# Patient Record
Sex: FEMALE | Race: WHITE | URBAN - METROPOLITAN AREA
[De-identification: names, ages, dates, MRNs, and addresses within clinical notes are randomized per-mention and may not be internally consistent; named-entity substitution may affect disease eponyms.]

---

## 2017-01-02 DIAGNOSIS — A60.00 HERPES SIMPLEX INFECTION OF GENITOURINARY SYSTEM: Primary | ICD-10-CM

## 2017-01-02 RX ORDER — VALACYCLOVIR HYDROCHLORIDE 500 MG/1
500 TABLET, FILM COATED ORAL DAILY
Qty: 90 TABLET | Refills: 1 | Status: SHIPPED | OUTPATIENT
Start: 2017-01-02 | End: 2017-07-25

## 2017-01-02 NOTE — TELEPHONE ENCOUNTER
Valacyclovir 500mg      Last Written Prescription Date:  6/2/16  Last Fill Quantity: 90,   # refills: 3  Last Office Visit with [facility] primary care provider:  6/2/16-menopause appt  Future Office visit: none    Rx was sent to [facility], Fax received from [facility].   Called pt and verified switch from [facility] to [facility], pt verified and stated she was switching for insurance purposes.  Called [facility] and spoke to Nataly who cancelled remaining of pt's Rx at their location, she stated pt still had 6 month supply before she cancelled the Rx.  6 month supply sent to [facility].

## 2017-01-03 ENCOUNTER — TELEPHONE (OUTPATIENT)
Dept: NURSING | Facility: CLINIC | Age: 52
End: 2017-01-03

## 2017-01-03 NOTE — TELEPHONE ENCOUNTER
Fax received from [facility] wondering if pt could take an alternative acyclovir 400mg instead of valacyclovir 500mg daily, or no change. Called pharmacist who stated that it is their policy to ask but if pt has been successful on another regimen then the medication dose not need to be changed. Informed pharmacist pt has been on valacyclovir 500mg in the past and it has worked for her, so they should keep the Rx for what was sent by the provider. He stated understanding and had no further questions.

## 2017-01-05 ENCOUNTER — TRANSFERRED RECORDS (OUTPATIENT)
Dept: HEALTH INFORMATION MANAGEMENT | Facility: CLINIC | Age: 52
End: 2017-01-05

## 2017-01-25 DIAGNOSIS — M46.46 DISCITIS OF LUMBAR REGION: Primary | ICD-10-CM

## 2017-01-25 DIAGNOSIS — Z48.89 AFTERCARE FOLLOWING SURGERY FOR INJURY AND TRAUMA: ICD-10-CM

## 2017-01-25 LAB
BASOPHILS # BLD AUTO: 0 10E9/L (ref 0–0.2)
BASOPHILS NFR BLD AUTO: 0.3 %
CRP SERPL-MCNC: <2.9 MG/L (ref 0–8)
DIFFERENTIAL METHOD BLD: NORMAL
EOSINOPHIL # BLD AUTO: 0 10E9/L (ref 0–0.7)
EOSINOPHIL NFR BLD AUTO: 0.4 %
ERYTHROCYTE [DISTWIDTH] IN BLOOD BY AUTOMATED COUNT: 12.6 % (ref 10–15)
ERYTHROCYTE [SEDIMENTATION RATE] IN BLOOD BY WESTERGREN METHOD: 10 MM/H (ref 0–30)
HCT VFR BLD AUTO: 38.3 % (ref 35–47)
HGB BLD-MCNC: 12.6 G/DL (ref 11.7–15.7)
LYMPHOCYTES # BLD AUTO: 2.4 10E9/L (ref 0.8–5.3)
LYMPHOCYTES NFR BLD AUTO: 31.2 %
MCH RBC QN AUTO: 30.5 PG (ref 26.5–33)
MCHC RBC AUTO-ENTMCNC: 32.9 G/DL (ref 31.5–36.5)
MCV RBC AUTO: 93 FL (ref 78–100)
MONOCYTES # BLD AUTO: 0.4 10E9/L (ref 0–1.3)
MONOCYTES NFR BLD AUTO: 4.7 %
NEUTROPHILS # BLD AUTO: 4.9 10E9/L (ref 1.6–8.3)
NEUTROPHILS NFR BLD AUTO: 63.4 %
PLATELET # BLD AUTO: 248 10E9/L (ref 150–450)
RBC # BLD AUTO: 4.13 10E12/L (ref 3.8–5.2)
WBC # BLD AUTO: 7.7 10E9/L (ref 4–11)

## 2017-01-25 PROCEDURE — 36415 COLL VENOUS BLD VENIPUNCTURE: CPT | Performed by: FAMILY MEDICINE

## 2017-01-25 PROCEDURE — 85025 COMPLETE CBC W/AUTO DIFF WBC: CPT | Performed by: FAMILY MEDICINE

## 2017-01-25 PROCEDURE — 86140 C-REACTIVE PROTEIN: CPT | Performed by: FAMILY MEDICINE

## 2017-01-25 PROCEDURE — 85652 RBC SED RATE AUTOMATED: CPT | Performed by: FAMILY MEDICINE

## 2017-02-02 ENCOUNTER — TRANSFERRED RECORDS (OUTPATIENT)
Dept: HEALTH INFORMATION MANAGEMENT | Facility: CLINIC | Age: 52
End: 2017-02-02

## 2017-03-13 DIAGNOSIS — Z79.890 HORMONE REPLACEMENT THERAPY (HRT): ICD-10-CM

## 2017-03-13 RX ORDER — ESTRADIOL 1 MG/1
1 TABLET ORAL DAILY
Qty: 90 TABLET | Refills: 0 | Status: SHIPPED | OUTPATIENT
Start: 2017-03-13 | End: 2017-03-23

## 2017-03-13 NOTE — TELEPHONE ENCOUNTER
estradiol (ESTRACE) 1 MG tablet  Last Written Prescription Date:  6/3/16  Last Fill Quantity: 90,   # refills: 2  Last Office Visit with [facility] primary care provider:  6/2/16  Future Office visit: none    Routing refill request to provider for review/approval because:  Refill sent per [facility] protocol.   Will RTC in a year for refill or PCP can refill for her.

## 2017-03-17 ENCOUNTER — TRANSFERRED RECORDS (OUTPATIENT)
Dept: HEALTH INFORMATION MANAGEMENT | Facility: CLINIC | Age: 52
End: 2017-03-17

## 2017-03-23 ENCOUNTER — OFFICE VISIT (OUTPATIENT)
Dept: FAMILY MEDICINE | Facility: CLINIC | Age: 52
End: 2017-03-23
Payer: COMMERCIAL

## 2017-03-23 VITALS
RESPIRATION RATE: 14 BRPM | HEIGHT: 69 IN | OXYGEN SATURATION: 98 % | SYSTOLIC BLOOD PRESSURE: 104 MMHG | HEART RATE: 78 BPM | WEIGHT: 179 LBS | BODY MASS INDEX: 26.51 KG/M2 | DIASTOLIC BLOOD PRESSURE: 64 MMHG | TEMPERATURE: 98.3 F

## 2017-03-23 DIAGNOSIS — I10 ESSENTIAL HYPERTENSION WITH GOAL BLOOD PRESSURE LESS THAN 140/90: ICD-10-CM

## 2017-03-23 DIAGNOSIS — Z79.890 HORMONE REPLACEMENT THERAPY (HRT): ICD-10-CM

## 2017-03-23 DIAGNOSIS — M51.369 L4-L5 DISC BULGE: Primary | ICD-10-CM

## 2017-03-23 PROCEDURE — 99213 OFFICE O/P EST LOW 20 MIN: CPT | Performed by: FAMILY MEDICINE

## 2017-03-23 RX ORDER — LISINOPRIL 10 MG/1
TABLET ORAL
Qty: 90 TABLET | Refills: 2 | Status: SHIPPED | OUTPATIENT
Start: 2017-03-23 | End: 2018-06-28 | Stop reason: DRUGHIGH

## 2017-03-23 RX ORDER — ESTRADIOL 1 MG/1
1 TABLET ORAL DAILY
Qty: 90 TABLET | Refills: 2 | Status: SHIPPED | OUTPATIENT
Start: 2017-03-23 | End: 2018-02-25

## 2017-03-23 NOTE — PROGRESS NOTES
Chief Complaint   Patient presents with     Forms     FMLA     Recheck Medication     follow up refills     Subjective:ssee FMLA form. Basically she had surgery in December, finally got back to work full-time now but there are times when her back flares up and she needs to have FMLA papers filled out so that they will pricilla her time off to go to doctor's visits and if she needs to take a few days off to recover, she can do that. She also needs a refill of the hormone replacement and blood pressure medication till her next physical is due in December.    Assessment and plan: FMLA forms for chronic back problems in case she needs significant time off over the years. It seems reasonable. Meds refilled.

## 2017-03-23 NOTE — NURSING NOTE
"Chief Complaint   Patient presents with     Forms     FMLA     Recheck Medication     follow up refills       Initial /64 (BP Location: Left arm, Patient Position: Chair, Cuff Size: Adult Regular)  Pulse 78  Temp 98.3  F (36.8  C) (Oral)  Resp 14  Ht 5' 9\" (1.753 m)  Wt 179 lb (81.2 kg)  SpO2 98%  Breastfeeding? No  BMI 26.43 kg/m2 Estimated body mass index is 26.43 kg/(m^2) as calculated from the following:    Height as of this encounter: 5' 9\" (1.753 m).    Weight as of this encounter: 179 lb (81.2 kg).  BP completed using cuff size: regular    Health Maintenance that is potentially due pending provider review:  PHQ9    PHQ/ACT/LAMIN--Gave pt questionnare  "

## 2017-03-23 NOTE — MR AVS SNAPSHOT
"              After Visit Summary   3/23/2017    Sima Ross    MRN: [id number]           Patient Information     Date Of Birth          1965        Visit Information        Provider Department      3/23/2017 8:30 AM Ilia Cervantes MD [facility]        Today's Diagnoses     L4-L5 disc bulge    -  1    Essential hypertension with goal blood pressure less than 140/90        Hormone replacement therapy (HRT)           Follow-ups after your visit        Who to contact     If you have questions or need follow up information about today's clinic visit or your schedule please contact [facility] directly at [phone].  Normal or non-critical lab and imaging results will be communicated to you by MyChart, letter or phone within 4 business days after the clinic has received the results. If you do not hear from us within 7 days, please contact the clinic through Bootstrap[employer] or phone. If you have a critical or abnormal lab result, we will notify you by phone as soon as possible.  Submit refill requests through textmetix or call your pharmacy and they will forward the refill request to us. Please allow 3 business days for your refill to be completed.          Additional Information About Your Visit        MyChart Information     textmetix lets you send messages to your doctor, view your test results, renew your prescriptions, schedule appointments and more. To sign up, go to [email or web address] . Click on \"Log in\" on the left side of the screen, which will take you to the Welcome page. Then click on \"Sign up Now\" on the right side of the page.     You will be asked to enter the access code listed below, as well as some personal information. Please follow the directions to create your username and password.     Your access code is: [email or web address]  Expires: 2017 11:03 AM     Your access code will  in 90 days. If you need help or a new code, please call your [place] clinic or " "[phone].        Care EveryWhere ID     This is your Care EveryWhere ID. This could be used by other organizations to access your [place] medical records  [id number]        Your Vitals Were     Pulse Temperature Respirations Height Pulse Oximetry Breastfeeding?    78 98.3  F (36.8  C) (Oral) 14 5' 9\" (1.753 m) 98% No    BMI (Body Mass Index)                   26.43 kg/m2            Blood Pressure from Last 3 Encounters:   03/23/17 104/64   12/05/16 (!) 135/98   07/28/16 138/80    Weight from Last 3 Encounters:   03/23/17 179 lb (81.2 kg)   12/05/16 169 lb 8 oz (76.9 kg)   07/28/16 171 lb (77.6 kg)              Today, you had the following     No orders found for display         Where to get your medicines      These medications were sent to [facility] [address] AT [address] to [address], [place] [address]     Phone:  [phone]     estradiol 1 MG tablet    lisinopril 10 MG tablet    tiZANidine 4 MG tablet          Primary Care Provider Office Phone # Fax #    Jessica Flores [phone]       [facility] [address] [address]        Thank you!     Thank you for choosing [facility]  for your care. Our goal is always to provide you with excellent care. Hearing back from our patients is one way we can continue to improve our services. Please take a few minutes to complete the written survey that you may receive in the mail after your visit with us. Thank you!             Your Updated Medication List - Protect others around you: Learn how to safely use, store and throw away your medicines at www.disposemymeds.org.          This list is accurate as of: 3/23/17 11:03 AM.  Always use your most recent med list.                   Brand Name Dispense Instructions for use    dicyclomine 20 MG tablet    BENTYL    360 tablet    Take 1 tablet (20 mg) by mouth every 6 " hours       estradiol 1 MG tablet    ESTRACE    90 tablet    Take 1 tablet (1 mg) by mouth daily       lisinopril 10 MG tablet    PRINIVIL/ZESTRIL    90 tablet    HOLD       oxyCODONE-acetaminophen 5-325 MG per tablet    PERCOCET         rizatriptan 10 MG tablet    MAXALT    18 tablet    Take 1 tablet (10 mg) by mouth at onset of headache for migraine May repeat dose in 2 hours.  Do not exceed 30 mg in 24 hours       tiZANidine 4 MG tablet    ZANAFLEX    160 tablet    Take 2 tablets (8 mg) by mouth 3 times daily       traZODone 50 MG tablet    DESYREL    180 tablet    Take 2 tablets (100 mg) by mouth nightly as needed for sleep       triamterene-hydrochlorothiazide 37.5-25 MG per tablet    MAXZIDE-25    90 tablet    Take 1 tablet by mouth daily       valACYclovir 500 MG tablet    VALTREX    90 tablet    Take 1 tablet (500 mg) by mouth daily

## 2017-03-24 ASSESSMENT — PATIENT HEALTH QUESTIONNAIRE - PHQ9: SUM OF ALL RESPONSES TO PHQ QUESTIONS 1-9: 6

## 2017-03-29 ENCOUNTER — TELEPHONE (OUTPATIENT)
Dept: FAMILY MEDICINE | Facility: CLINIC | Age: 52
End: 2017-03-29

## 2017-03-29 NOTE — TELEPHONE ENCOUNTER
Reason for Call:  Other FMLA     Detailed comments: Duration of time needs to be specified on FMLA form...Please revise  And Fax back     Phone Number Patient can be reached at: Home number on file [phone] (home) Please call patient upon completion     Best Time: anytime     Can we leave a detailed message on this number? YES    Call taken on 3/29/2017 at 3:34 PM by Ernesto Capone

## 2017-03-30 NOTE — TELEPHONE ENCOUNTER
I called [facility] and they just needed something in 7c filled in. I did that and we will fax it bck  LM for pt it is re-sent    will bring up to fax

## 2017-03-30 NOTE — TELEPHONE ENCOUNTER
I LM for pt to call back as it is not clear to me what they want. The note talks about extending the previous leave, but that is not what the form I filled out was about-it is about potential FUTURE episodes where she would need time out of work

## 2017-04-05 ENCOUNTER — HOSPITAL ENCOUNTER (OUTPATIENT)
Dept: LAB | Facility: CLINIC | Age: 52
Discharge: HOME | End: 2017-04-05
Attending: FAMILY MEDICINE | Admitting: FAMILY MEDICINE
Payer: COMMERCIAL

## 2017-04-05 DIAGNOSIS — D58.2 ELEVATED HEMOGLOBIN (H): ICD-10-CM

## 2017-04-05 LAB
ALBUMIN SERPL-MCNC: 3.6 G/DL (ref 3.4–5)
ALP SERPL-CCNC: 76 U/L (ref 40–150)
ALT SERPL W P-5'-P-CCNC: 13 U/L (ref 0–50)
AST SERPL W P-5'-P-CCNC: 8 U/L (ref 0–45)
BASOPHILS # BLD AUTO: 0.1 10E9/L (ref 0–0.2)
BASOPHILS NFR BLD AUTO: 1.1 %
BILIRUB DIRECT SERPL-MCNC: <0.1 MG/DL (ref 0–0.2)
BILIRUB SERPL-MCNC: 0.3 MG/DL (ref 0.2–1.3)
DIFFERENTIAL METHOD BLD: NORMAL
EOSINOPHIL # BLD AUTO: 0.2 10E9/L (ref 0–0.7)
EOSINOPHIL NFR BLD AUTO: 3.4 %
ERYTHROCYTE [DISTWIDTH] IN BLOOD BY AUTOMATED COUNT: 11.8 % (ref 10–15)
HCT VFR BLD AUTO: 40.1 % (ref 35–47)
HGB BLD-MCNC: 14 G/DL (ref 11.7–15.7)
IMM GRANULOCYTES # BLD: 0 10E9/L (ref 0–0.4)
IMM GRANULOCYTES NFR BLD: 0.4 %
LYMPHOCYTES # BLD AUTO: 1.9 10E9/L (ref 0.8–5.3)
LYMPHOCYTES NFR BLD AUTO: 35.4 %
MCH RBC QN AUTO: 31.5 PG (ref 26.5–33)
MCHC RBC AUTO-ENTMCNC: 34.9 G/DL (ref 31.5–36.5)
MCV RBC AUTO: 90 FL (ref 78–100)
MONOCYTES # BLD AUTO: 0.3 10E9/L (ref 0–1.3)
MONOCYTES NFR BLD AUTO: 5.5 %
NEUTROPHILS # BLD AUTO: 2.9 10E9/L (ref 1.6–8.3)
NEUTROPHILS NFR BLD AUTO: 54.2 %
NRBC # BLD AUTO: 0 10*3/UL
NRBC BLD AUTO-RTO: 0 /100
PLATELET # BLD AUTO: 249 10E9/L (ref 150–450)
PROT SERPL-MCNC: 6.7 G/DL (ref 6.8–8.8)
RBC # BLD AUTO: 4.45 10E12/L (ref 3.8–5.2)
WBC # BLD AUTO: 5.3 10E9/L (ref 4–11)

## 2017-04-05 PROCEDURE — 36415 COLL VENOUS BLD VENIPUNCTURE: CPT | Performed by: FAMILY MEDICINE

## 2017-04-05 PROCEDURE — 85025 COMPLETE CBC W/AUTO DIFF WBC: CPT | Performed by: FAMILY MEDICINE

## 2017-04-05 PROCEDURE — 80076 HEPATIC FUNCTION PANEL: CPT | Performed by: FAMILY MEDICINE

## 2017-04-06 ENCOUNTER — TRANSFERRED RECORDS (OUTPATIENT)
Dept: HEALTH INFORMATION MANAGEMENT | Facility: CLINIC | Age: 52
End: 2017-04-06

## 2017-04-06 DIAGNOSIS — Z48.89 AFTERCARE FOLLOWING SURGERY FOR INJURY OR TRAUMA: Primary | ICD-10-CM

## 2017-04-06 LAB — ERYTHROCYTE [SEDIMENTATION RATE] IN BLOOD BY WESTERGREN METHOD: 8 MM/H (ref 0–30)

## 2017-04-06 PROCEDURE — 36415 COLL VENOUS BLD VENIPUNCTURE: CPT

## 2017-04-06 PROCEDURE — 86140 C-REACTIVE PROTEIN: CPT

## 2017-04-06 PROCEDURE — 85652 RBC SED RATE AUTOMATED: CPT

## 2017-04-07 LAB — CRP SERPL-MCNC: <2.9 MG/L (ref 0–8)

## 2017-04-10 ENCOUNTER — TRANSFERRED RECORDS (OUTPATIENT)
Dept: HEALTH INFORMATION MANAGEMENT | Facility: CLINIC | Age: 52
End: 2017-04-10

## 2017-04-18 ENCOUNTER — THERAPY VISIT (OUTPATIENT)
Dept: PHYSICAL THERAPY | Facility: CLINIC | Age: 52
End: 2017-04-18
Payer: COMMERCIAL

## 2017-04-18 DIAGNOSIS — M54.50 MIDLINE LOW BACK PAIN WITHOUT SCIATICA: Primary | ICD-10-CM

## 2017-04-18 DIAGNOSIS — Z47.89 ORTHOPEDIC AFTERCARE: ICD-10-CM

## 2017-04-18 PROCEDURE — 97161 PT EVAL LOW COMPLEX 20 MIN: CPT | Mod: GP | Performed by: PHYSICAL THERAPIST

## 2017-04-18 PROCEDURE — 97110 THERAPEUTIC EXERCISES: CPT | Mod: GP | Performed by: PHYSICAL THERAPIST

## 2017-04-18 PROCEDURE — 97530 THERAPEUTIC ACTIVITIES: CPT | Mod: GP | Performed by: PHYSICAL THERAPIST

## 2017-04-18 NOTE — PROGRESS NOTES
Subjective:    Patient is a 51 year old female presenting with rehab left ankle/foot hpi.                                      Pertinent medical history includes:  High blood pressure.  Medical allergies: no.  Other surgeries include:  Orthopedic surgery.  Current medications:  Muscle relaxants, high blood pressure medication and steroids.  Current occupation is Customer service .  Patient is working in normal job without restrictions.  Primary job tasks include:  Prolonged sitting and prolonged standing.    Barriers include:  None as reported by patient.    Red flags:  Changes in bowel and bladder habits.      Oswestry Score: 60 %                 Objective:    System    Physical Exam    General     ROS    Assessment/Plan:                 first set trops negative 2nd trops negative. unlikely ACS. will discharge

## 2017-04-18 NOTE — PROGRESS NOTES
Physical Therapy Initial Evaluation  April 18, 2017     Precautions/Restrictions/MD instructions: PT eval and treat.     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 Coughing is excruciatingly painful. Does report that leg sx's resolved immediately following surgery. Quality of pain is dull and aching. Pains are described as intermittent in nature. Pain is worse: as day goes on. Pain is rated 7/10.   History of symptoms: Pains began gradually as the result of insidious onset. Since onset, symptoms are improving.  Worsened by: Sitting, standing, .    Alleviated by: Nothing. Now finishing prednisone which has not touched her pain.   General health as reported by patient: good  Pertinent medical/surgical history: diskectomy 2009, fusion 2016. Imaging: x-ray and MRI and myelogram. Current occupational status: Customer Service. Patient's goals are: decrease pain, improve mobility. Return to MD:  June.     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 She presents with signs and symptoms consistent with post-surgical status. These impairments limit her ability to sit, stand, walk, navigate stairs. Skilled PT services are necessary in order to reduce impairments and improve independent function.    Objective:  LUMBAR EXAMINATION    Posture: Site of fusion is WNL for lordosis, though loss of lordotic curve above site of fusion.    Active ROM Limitation   Flexion     Louis? Major loss, incr LBP and buttock pain  na   Extension Mod loss, no pain    L R   Sideglide Nil loss, no pain Nil loss, no pain     Neurological testing (myotomes, sensation, reflexes, nerve tension) not indicated at this time.    Special Tests:  Rust: na  Spring Testing: na  Glute MMT: 4-/5  bilaterally    Therapeutic Activities (12 minutes): Discussed with patient postural correction with instruction in use of lumbar/towel roll and sitting posture when unsupported. Education provided regarding impact of posture and body mechanics on symptom production. Patient encouraged to monitor this correlation as part of overall self management. Also discussed goal of avoiding flexion-based postures/activities/stretches at this time as they may exacerbate current symptoms.     Assessment/Plan:    The patient is a 51 year old female with chief complaint of LBP.    The patient has the following significant findings with corresponding treatment plan.  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  Pain -  hot/cold therapy, US, electric stimulation, manual therapy, splint/taping/bracing/orthotics, self management, education, directional preference exercise and home program  Decreased ROM/flexibility - manual therapy, therapeutic exercise and home program  Decreased joint mobility - manual therapy and therapeutic exercise  Decreased strength - therapeutic exercise, therapeutic activities and home program  Impaired muscle performance - neuro re-education  Decreased function - therapeutic activities  Impaired posture - neuro re-education    Therapy Evaluation Codes:   1) History comprised of:   Personal factors that impact the plan of care:      Past/current experiences.    Comorbidity factors that impact the plan of care are:      None.     Medications impacting care: None.  2) Examination of Body Systems comprised of:   Body structures and functions that impact the plan of care:      Lumbar spine.   Activity limitations that impact the plan of care are:      Bathing, Bending, Driving, Dressing, Lifting, Sitting, Squatting/kneeling, Stairs, Standing and Walking.   Clinical presentation characteristics are:    Stable/Uncomplicated.  3) Presentation comprised of:   Presentation scored as Low complexity with  uncomplicated characteristics..  4) Decision-Making    Low complexity using standardized patient assessment instrument and/or measureable assessment of functional outcome.  Cumulative Therapy Evaluation is: Low complexity.    Previous and current functional limitations:  (See Goal Flow Sheet for this information)    Short term and Long term goals: (See Goal Flow Sheet for this information)     Communication ability:  Patient appears to be able to clearly communicate and understand verbal and written communication and follow directions correctly.  Treatment Explanation - The following has been discussed with the patient: RX ordered/plan of care, anticipated outcomes, and possible risks and side effects.  This patient would benefit from PT intervention to resume normal activities.   Rehab potential is good.    Frequency:  1 X week, once daily  Duration:  for 5 weeks  Discharge Plan: Achieve all LTGs, be independent in home treatment program, and reach maximal therapeutic benefit.    Please refer to the daily flowsheet for treatment today, total treatment time and time spent performing 1:1 timed codes.

## 2017-04-21 ENCOUNTER — TRANSFERRED RECORDS (OUTPATIENT)
Dept: HEALTH INFORMATION MANAGEMENT | Facility: CLINIC | Age: 52
End: 2017-04-21

## 2017-05-09 ENCOUNTER — TELEPHONE (OUTPATIENT)
Dept: FAMILY MEDICINE | Facility: CLINIC | Age: 52
End: 2017-05-09

## 2017-05-09 NOTE — TELEPHONE ENCOUNTER
Received form(s) from [facility]  Placed form(s) in/on SN's box.  Forms need to be filled out and signed and faxed to number listed on form..    Call pt to verify form was sent: No  Copy needs to be sent for scanning after completion: Yes    Vidya Christian MA  [facility]

## 2017-05-10 NOTE — TELEPHONE ENCOUNTER
Patient calling back and she did Confirm that she did contact [facility]  X to Shantal :)    Thank You

## 2017-05-10 NOTE — TELEPHONE ENCOUNTER
Left  radha Gao to -- Please  Confirm with patient  that she contacted [facility] for medical consultation services     form in tower to confirm before faxing  Shantal Russell CMA

## 2017-05-22 ENCOUNTER — TELEPHONE (OUTPATIENT)
Dept: FAMILY MEDICINE | Facility: CLINIC | Age: 52
End: 2017-05-22

## 2017-05-22 NOTE — TELEPHONE ENCOUNTER
Clinic Action Needed: Yes  Medication Refilled: No per RN Refill Guidelines  Additional Notes: Trazodone 50 mg.  Sima reports her [facility] pharmacy insurance plan requires further refills to be quantity of 100.  She states [facility] did send clinic a letter, but wanted to make sure Dr. Lopez knew this, as she is due for a refill now.    Routed To: Dr. Flores / LELA Naidu RN  [facility]

## 2017-05-23 NOTE — TELEPHONE ENCOUNTER
Original Rx from 12/2016 is for #180 with 3 refills.  Spoke with patient. States they sent her letter stating 100 mg tablets are cheaper and would request from PCP.  Called [facility]  States they have no documentation of letter/form. Can't find information about requesting 100 mg tablets  Last refill 3/16/17 for #180 - states patient has 2 refills remaining  Patient informed.  Will call us back if she hears anything different from NEIL Slater RN

## 2017-05-31 DIAGNOSIS — Z79.890 HORMONE REPLACEMENT THERAPY (HRT): ICD-10-CM

## 2017-05-31 RX ORDER — ESTRADIOL 1 MG/1
TABLET ORAL
Qty: 90 TABLET | Refills: 0 | OUTPATIENT
Start: 2017-05-31

## 2017-05-31 NOTE — TELEPHONE ENCOUNTER
Estradiol (Estrace) 1 MG tablet      Last Written Prescription Date:  3/23/17  Last Fill Quantity: 90 tabs,   # refills: 2  Last Office Visit with [facility] primary care provider:  6/2/16-Annual  Future Office visit: None    Rx that was sent on 3/23/17 for 90 tabs/2rf was by Ilia Cervantes MD and not Dr. Mina. Rx denied has pt has refills.      Closing encounter.

## 2017-06-06 ENCOUNTER — TRANSFERRED RECORDS (OUTPATIENT)
Dept: HEALTH INFORMATION MANAGEMENT | Facility: CLINIC | Age: 52
End: 2017-06-06

## 2017-06-07 ENCOUNTER — TRANSFERRED RECORDS (OUTPATIENT)
Dept: HEALTH INFORMATION MANAGEMENT | Facility: CLINIC | Age: 52
End: 2017-06-07

## 2017-07-06 ENCOUNTER — TRANSFERRED RECORDS (OUTPATIENT)
Dept: HEALTH INFORMATION MANAGEMENT | Facility: CLINIC | Age: 52
End: 2017-07-06

## 2017-07-28 ENCOUNTER — TRANSFERRED RECORDS (OUTPATIENT)
Dept: HEALTH INFORMATION MANAGEMENT | Facility: CLINIC | Age: 52
End: 2017-07-28

## 2017-08-23 ENCOUNTER — TRANSFERRED RECORDS (OUTPATIENT)
Dept: HEALTH INFORMATION MANAGEMENT | Facility: CLINIC | Age: 52
End: 2017-08-23

## 2017-08-30 ENCOUNTER — OFFICE VISIT (OUTPATIENT)
Dept: FAMILY MEDICINE | Facility: CLINIC | Age: 52
End: 2017-08-30
Payer: COMMERCIAL

## 2017-08-30 VITALS
HEIGHT: 69 IN | TEMPERATURE: 98.4 F | WEIGHT: 163.8 LBS | HEART RATE: 86 BPM | SYSTOLIC BLOOD PRESSURE: 124 MMHG | DIASTOLIC BLOOD PRESSURE: 80 MMHG | BODY MASS INDEX: 24.26 KG/M2 | RESPIRATION RATE: 14 BRPM | OXYGEN SATURATION: 94 %

## 2017-08-30 DIAGNOSIS — M54.16 LUMBAR RADICULOPATHY: ICD-10-CM

## 2017-08-30 DIAGNOSIS — Z01.818 PREOP GENERAL PHYSICAL EXAM: Primary | ICD-10-CM

## 2017-08-30 DIAGNOSIS — E04.9 GOITER: ICD-10-CM

## 2017-08-30 DIAGNOSIS — M51.369 L4-L5 DISC BULGE: ICD-10-CM

## 2017-08-30 LAB — HGB BLD-MCNC: 12.9 G/DL (ref 11.7–15.7)

## 2017-08-30 PROCEDURE — 99214 OFFICE O/P EST MOD 30 MIN: CPT | Mod: 25 | Performed by: FAMILY MEDICINE

## 2017-08-30 PROCEDURE — 84443 ASSAY THYROID STIM HORMONE: CPT | Performed by: FAMILY MEDICINE

## 2017-08-30 PROCEDURE — 36415 COLL VENOUS BLD VENIPUNCTURE: CPT | Performed by: FAMILY MEDICINE

## 2017-08-30 PROCEDURE — 93000 ELECTROCARDIOGRAM COMPLETE: CPT | Performed by: FAMILY MEDICINE

## 2017-08-30 PROCEDURE — 85018 HEMOGLOBIN: CPT | Performed by: FAMILY MEDICINE

## 2017-08-30 PROCEDURE — 84439 ASSAY OF FREE THYROXINE: CPT | Performed by: FAMILY MEDICINE

## 2017-08-30 NOTE — MR AVS SNAPSHOT
After Visit Summary   8/30/2017    Sima Ross    MRN: [id number]           Patient Information     Date Of Birth          1965        Visit Information        Provider Department      8/30/2017 12:30 PM Jessica Flores MD [facility]        Today's Diagnoses     Preop general physical exam    -  1    L4-L5 disc bulge        Lumbar radiculopathy        Goiter          Care Instructions      Before Your Surgery      Call your surgeon if there is any change in your health. This includes signs of a cold or flu (such as a sore throat, runny nose, cough, rash or fever).    Do not smoke, drink alcohol or take over the counter medicine (unless your surgeon or primary care doctor tells you to) for the 24 hours before and after surgery.    If you take prescribed drugs: Follow your doctor s orders about which medicines to take and which to stop until after surgery.    Eating and drinking prior to surgery: follow the instructions from your surgeon    Take a shower or bath the night before surgery. Use the soap your surgeon gave you to gently clean your skin. If you do not have soap from your surgeon, use your regular soap. Do not shave or scrub the surgery site.  Wear clean pajamas and have clean sheets on your bed.           Follow-ups after your visit        Who to contact     If you have questions or need follow up information about today's clinic visit or your schedule please contact [facility] directly at [phone].  Normal or non-critical lab and imaging results will be communicated to you by MyChart, letter or phone within 4 business days after the clinic has received the results. If you do not hear from us within 7 days, please contact the clinic through HelpMeNowhart or phone. If you have a critical or abnormal lab result, we will notify you by phone as soon as possible.  Submit refill requests through [employer] or call your pharmacy and they will forward the refill  "request to us. Please allow 3 business days for your refill to be completed.          Additional Information About Your Visit        MYOShart Information     [employer] lets you send messages to your doctor, view your test results, renew your prescriptions, schedule appointments and more. To sign up, go to [email or web address] . Click on \"Log in\" on the left side of the screen, which will take you to the Welcome page. Then click on \"Sign up Now\" on the right side of the page.     You will be asked to enter the access code listed below, as well as some personal information. Please follow the directions to create your username and password.     Your access code is: [email or web address]  Expires: 2017  1:01 PM     Your access code will  in 90 days. If you need help or a new code, please call your [place] clinic or [phone].        Care EveryWhere ID     This is your [facility] EveryWhere ID. This could be used by other organizations to access your [place] medical records  [id number]        Your Vitals Were     Pulse Temperature Respirations Height Pulse Oximetry Breastfeeding?    86 98.4  F (36.9  C) 14 5' 9\" (1.753 m) 94% No    BMI (Body Mass Index)                   24.19 kg/m2            Blood Pressure from Last 3 Encounters:   17 124/80   17 104/64   16 (!) 135/98    Weight from Last 3 Encounters:   17 163 lb 12.8 oz (74.3 kg)   17 179 lb (81.2 kg)   16 169 lb 8 oz (76.9 kg)              We Performed the Following     EKG 12-lead complete w/read - Clinics     Hemoglobin     TSH with free T4 reflex        Primary Care Provider Office Phone # Fax #    Jessica Flores [phone] [address] [address]        Equal Access to Services     PRESTON CAM : katya Graves, heena field. [facility] [phone].    ATENCIÓN: Si tanner mcbride " a rogers disposición servicios gratuitos de asistencia lingüística. Shell fontanez [phone].    We comply with applicable federal civil rights laws and [place] laws. We do not discriminate on the basis of race, color, national origin, age, disability sex, sexual orientation or gender identity.            Thank you!     Thank you for choosing [facility]  for your care. Our goal is always to provide you with excellent care. Hearing back from our patients is one way we can continue to improve our services. Please take a few minutes to complete the written survey that you may receive in the mail after your visit with us. Thank you!             Your Updated Medication List - Protect others around you: Learn how to safely use, store and throw away your medicines at www.disposemymeds.org.          This list is accurate as of: 8/30/17  1:01 PM.  Always use your most recent med list.                   Brand Name Dispense Instructions for use Diagnosis    dicyclomine 20 MG tablet    BENTYL    360 tablet    Take 1 tablet (20 mg) by mouth every 6 hours    L4-L5 disc bulge, Lumbar radiculopathy, Essential hypertension with goal blood pressure less than 140/90, Primary insomnia, Ulcerative colitis without complications, unspecified location (H)       estradiol 1 MG tablet    ESTRACE    90 tablet    Take 1 tablet (1 mg) by mouth daily    Hormone replacement therapy (HRT)       lisinopril 10 MG tablet    PRINIVIL/ZESTRIL    90 tablet    HOLD    Essential hypertension with goal blood pressure less than 140/90       oxyCODONE-acetaminophen 5-325 MG per tablet    PERCOCET          rizatriptan 10 MG tablet    MAXALT    18 tablet    Take 1 tablet (10 mg) by mouth at onset of headache for migraine May repeat dose in 2 hours.  Do not exceed 30 mg in 24 hours    Migraine without aura and without status migrainosus, not intractable       tiZANidine 4 MG tablet    ZANAFLEX    160 tablet    Take 2 tablets (8 mg) by mouth 3 times daily     L4-L5 disc bulge       traZODone 50 MG tablet    DESYREL    180 tablet    Take 2 tablets (100 mg) by mouth nightly as needed for sleep    Primary insomnia       triamterene-hydrochlorothiazide 37.5-25 MG per tablet    MAXZIDE-25    90 tablet    Take 1 tablet by mouth daily    Essential hypertension with goal blood pressure less than 140/90       valACYclovir 500 MG tablet    VALTREX    30 tablet    TAKE 1 TABLET DAILY    Herpes simplex infection of genitourinary system

## 2017-08-30 NOTE — LETTER
August 31, 2017      Sima Ross  [address] [facility] [address]        Dear ,    We are writing to inform you of your test results.  The TSH was a little low however the thyroid hormone level was perfect - lets' recheck this in another 4 months  The HGb was excellent  Thank you!    Resulted Orders   Hemoglobin   Result Value Ref Range    Hemoglobin 12.9 11.7 - 15.7 g/dL   TSH with free T4 reflex   Result Value Ref Range    TSH 0.23 (L) 0.40 - 4.00 mU/L   T4 free   Result Value Ref Range    T4 Free 1.35 0.76 - 1.46 ng/dL       If you have any questions or concerns, please call the clinic at the number listed above.       Sincerely,        Jessica Flores MD

## 2017-08-30 NOTE — PROGRESS NOTES
[facility]  [address]  [facility] [address] [phone]  Dept: [phone]    PRE-OP EVALUATION:  Today's date: 2017    Sima Ross (: 1965) presents for pre-operative evaluation assessment as requested by Dr. Edilberto Mcgowan.  She requires evaluation and anesthesia risk assessment prior to undergoing surgery/procedure for treatment of back pain .  Proposed procedure: spinal cord stimulator implant    Date of Surgery/ Procedure: 17  Time of Surgery/ Procedure: [facility]  Hospital/Surgical Facility: [facility]  Fax number for surgical facility: [phone]  Primary Physician: Jessica Flores  Type of Anesthesia Anticipated: to be determined    Patient has a Health Care Directive or Living Will:  YES hospital has copy    1. NO - Do you have a history of heart attack, stroke, stent, bypass or surgery on an artery in the head, neck, heart or legs?  2. NO - Do you ever have any pain or discomfort in your chest?  3. NO - Do you have a history of  Heart Failure?  4. NO - Are you troubled by shortness of breath when: walking on the level, up a slight hill or at night?  5. NO - Do you currently have a cold, bronchitis or other respiratory infection?  6. NO - Do you have a cough, shortness of breath or wheezing?  7. NO - Do you sometimes get pains in the calves of your legs when you walk?  8. NO - Do you or anyone in your family have previous history of blood clots?  9. NO - Do you or does anyone in your family have a serious bleeding problem such as prolonged bleeding following surgeries or cuts?  10. NO - Have you ever had problems with anemia or been told to take iron pills?  11. NO - Have you had any abnormal blood loss such as black, tarry or bloody stools, or abnormal vaginal bleeding?  12. YES - HAVE YOU EVER HAD A BLOOD TRANSFUSION? Remote in past  13. NO - Have you or any of your relatives ever had problems with anesthesia?  14. NO - Do you  have sleep apnea, excessive snoring or daytime drowsiness?  15. NO - Do you have any prosthetic heart valves?  16. NO - Do you have prosthetic joints?  17. NO - Is there any chance that you may be pregnant?        HPI:                                                      Brief HPI related to upcoming procedure: spine stimulator placement      See problem list for active medical problems.  Problems all longstanding and stable, except as noted/documented.  See ROS for pertinent symptoms related to these conditions.                                                                                                  .    MEDICAL HISTORY:                                                    Patient Active Problem List    Diagnosis Date Noted     Midline low back pain without sciatica 04/18/2017     Priority: Medium     Orthopedic aftercare 04/18/2017     Priority: Medium     Migraine without aura and without status migrainosus, not intractable 01/21/2016     Priority: Medium     Hypertension goal BP (blood pressure) < 140/90 12/08/2015     Priority: Medium     Anxiety state 09/14/2015     Priority: Medium     Problem list name updated by automated process. Provider to review       Major depressive disorder, recurrent episode, moderate (H) 09/14/2015     Priority: Medium     Lumbar radiculopathy 07/09/2015     Priority: Medium     Possible arachnoiditis sees scanned notes from [facility]       Ulcerative colitis without complications (H) 07/09/2015     Priority: Medium     Diagnosis updated by automated process. Provider to review and confirm.       L4-L5 disc bulge 07/09/2015     Priority: Medium      No past medical history on file.  Past Surgical History:   Procedure Laterality Date     DISCECTOMY CERVICAL MINIMALLY INVASIVE ONE LEVEL  7/2009     HYSTERECTOMY  7/2014     LAPAROSCOPIC HYSTERECTOMY TOTAL  2013     Current Outpatient Prescriptions   Medication Sig Dispense Refill     valACYclovir (VALTREX) 500 MG tablet TAKE  "1 TABLET DAILY 30 tablet 0     lisinopril (PRINIVIL/ZESTRIL) 10 MG tablet HOLD 90 tablet 2     estradiol (ESTRACE) 1 MG tablet Take 1 tablet (1 mg) by mouth daily 90 tablet 2     tiZANidine (ZANAFLEX) 4 MG tablet Take 2 tablets (8 mg) by mouth 3 times daily 160 tablet prn     oxyCODONE-acetaminophen (PERCOCET) 5-325 MG per tablet        dicyclomine (BENTYL) 20 MG tablet Take 1 tablet (20 mg) by mouth every 6 hours 360 tablet 3     traZODone (DESYREL) 50 MG tablet Take 2 tablets (100 mg) by mouth nightly as needed for sleep 180 tablet 3     rizatriptan (MAXALT) 10 MG tablet Take 1 tablet (10 mg) by mouth at onset of headache for migraine May repeat dose in 2 hours.  Do not exceed 30 mg in 24 hours 18 tablet 11     triamterene-hydrochlorothiazide (MAXZIDE-25) 37.5-25 MG per tablet Take 1 tablet by mouth daily 90 tablet 3     OTC products: none    Allergies   Allergen Reactions     Atenolol Other (See Comments)     Keflex [Cephalexin] Rash      Latex Allergy: NO    Social History   Substance Use Topics     Smoking status: Former Smoker     Quit date: 11/15/2015     Smokeless tobacco: Never Used     Alcohol use Yes      Comment: 2x per month     History   Drug Use No       REVIEW OF SYSTEMS:                                                    Constitutional, HEENT, cardiovascular, pulmonary, gi and gu systems are negative, except as otherwise noted.      EXAM:                                                    /80  Pulse 86  Temp 98.4  F (36.9  C)  Resp 14  Ht 5' 9\" (1.753 m)  Wt 163 lb 12.8 oz (74.3 kg)  SpO2 94%  Breastfeeding? No  BMI 24.19 kg/m2    GENERAL APPEARANCE: healthy, alert and no distress     HENT: ear canals and TM's normal and nose and mouth without ulcers or lesions     NECK: no adenopathy, no asymmetry, masses, or scars and thyroid normal to palpation     RESP: lungs clear to auscultation - no rales, rhonchi or wheezes     CV: regular rates and rhythm, normal S1 S2, no S3 or S4 and no " murmur, click or rub     ABDOMEN:  soft, nontender, no HSM or masses and bowel sounds normal     MS: standing on exam  Hard to sit due to pain     NEURO: Normal strength and tone, sensory exam grossly normal, mentation intact and speech normal     PSYCH: mentation appears normal. and affect normal/bright     LYMPHATICS: No axillary, cervical, or supraclavicular nodes    DIAGNOSTICS:                                                    EKG: appears normal, NSR, normal axis, normal intervals, no acute ST/T changes c/w ischemia, no LVH by voltage criteria, unchanged from previous tracings    Recent Labs   Lab Test  04/05/17   1108  01/25/17   1317  12/05/16   1155  01/22/16   HGB  14.0  12.6  15.8*   < >   --    PLT  249  248   --    --    --    NA   --    --   140   --   140   POTASSIUM   --    --   4.3   --   3.1   CR   --    --   0.90   --   1.02    < > = values in this interval not displayed.        IMPRESSION:                                                    Reason for surgery/procedure: failed back syndrome L4-5 disc bulge  Diagnosis/reason for consult: pain ongoing failing medications    The proposed surgical procedure is considered INTERMEDIATE risk.    REVISED CARDIAC RISK INDEX  The patient has the following serious cardiovascular risks for perioperative complications such as (MI, PE, VFib and 3  AV Block):  No serious cardiac risks  INTERPRETATION: 0 risks: Class I (very low risk - 0.4% complication rate)    The patient has the following additional risks for perioperative complications:  No identified additional risks      ICD-10-CM    1. Preop general physical exam Z01.818 Hemoglobin     EKG 12-lead complete w/read - Clinics   2. L4-L5 disc bulge M51.26 Hemoglobin     EKG 12-lead complete w/read - Clinics   3. Lumbar radiculopathy M54.16 Hemoglobin     EKG 12-lead complete w/read - Clinics   4. Goiter E04.9 TSH with free T4 reflex       RECOMMENDATIONS:                                                           --Patient is to take all scheduled medications on the day of surgery EXCEPT for modifications listed below.    APPROVAL GIVEN to proceed with proposed procedure, without further diagnostic evaluation       Signed Electronically by: Jessica Flores MD    Copy of this evaluation report is provided to requesting physician.    [place] Preop Guidelines

## 2017-08-31 LAB
T4 FREE SERPL-MCNC: 1.35 NG/DL (ref 0.76–1.46)
TSH SERPL DL<=0.005 MIU/L-ACNC: 0.23 MU/L (ref 0.4–4)

## 2017-08-31 NOTE — PROGRESS NOTES
Please call patient with normal results  The TSH was a little low however the thyroid hormone level was perfect - lets' recheck this in another 4 months  The HGb was excellent  Thank you!    Jessica

## 2017-09-01 ENCOUNTER — TELEPHONE (OUTPATIENT)
Dept: FAMILY MEDICINE | Facility: CLINIC | Age: 52
End: 2017-09-01

## 2017-09-28 ENCOUNTER — TRANSFERRED RECORDS (OUTPATIENT)
Dept: HEALTH INFORMATION MANAGEMENT | Facility: CLINIC | Age: 52
End: 2017-09-28

## 2017-10-11 ENCOUNTER — TELEPHONE (OUTPATIENT)
Dept: FAMILY MEDICINE | Facility: CLINIC | Age: 52
End: 2017-10-11

## 2017-10-11 NOTE — LETTER
We have noticed that you are currently overdue for your mammogram.     If you have had a mammogram in the past 2 years, please let us know so, we can update our records. You can send us a message via [employer] or call the clinic at [phone] to give us an update.     If you have not had a mammogram, we encourage you to schedule by contacting the   [facility] at [phone], or the [facility] at [phone].     If you are under/uninsured, we recommend you contact the [facility]. They offer mammograms on a sliding fee charge. You can schedule with them at [phone]. Ask them to send us the results.         Please contact the clinic with any questions.    Sincerely,        Care Team for Jessica Flores MD

## 2017-10-19 DIAGNOSIS — F41.1 ANXIETY STATE: Primary | ICD-10-CM

## 2017-10-19 RX ORDER — TRAZODONE HYDROCHLORIDE 100 MG/1
TABLET ORAL
Qty: 90 TABLET | Refills: 0 | OUTPATIENT
Start: 2017-10-19

## 2017-10-20 NOTE — TELEPHONE ENCOUNTER
The patient said her Rx got changed so it would be more affordable and so her Rx for a year is no longer good   Can we please contact the pharmacy and see what they have   [facility]

## 2017-10-20 NOTE — TELEPHONE ENCOUNTER
SN,  Patient requesting new Rx for Trazodone 100mg 1 tablet at HS PRN  Last Rx for 50mg 2 tablets at HS PRN - insurance won't cover  Looks like forms were filled out in May 2017 changing Rx to 100mg dose?   Pharmacy needing new Rx now   Order pended if you approve  Ghazala CHU RN

## 2017-10-23 RX ORDER — TRAZODONE HYDROCHLORIDE 100 MG/1
100 TABLET ORAL
Qty: 90 TABLET | Refills: 0 | Status: SHIPPED | OUTPATIENT
Start: 2017-10-23 | End: 2017-12-18

## 2017-10-23 RX ORDER — TRAZODONE HYDROCHLORIDE 100 MG/1
TABLET ORAL
Start: 2017-10-23

## 2017-10-23 NOTE — TELEPHONE ENCOUNTER
Denied  Refill request too early; Rx written 12/5/2016 for 1 year of 50mg (2 at HS)  Ghazala CHU RN

## 2017-10-26 ENCOUNTER — TRANSFERRED RECORDS (OUTPATIENT)
Dept: HEALTH INFORMATION MANAGEMENT | Facility: CLINIC | Age: 52
End: 2017-10-26

## 2017-11-08 ENCOUNTER — RADIANT APPOINTMENT (OUTPATIENT)
Dept: GENERAL RADIOLOGY | Facility: CLINIC | Age: 52
End: 2017-11-08
Attending: FAMILY MEDICINE
Payer: COMMERCIAL

## 2017-11-08 ENCOUNTER — OFFICE VISIT (OUTPATIENT)
Dept: FAMILY MEDICINE | Facility: CLINIC | Age: 52
End: 2017-11-08
Payer: COMMERCIAL

## 2017-11-08 ENCOUNTER — TELEPHONE (OUTPATIENT)
Dept: FAMILY MEDICINE | Facility: CLINIC | Age: 52
End: 2017-11-08

## 2017-11-08 VITALS
SYSTOLIC BLOOD PRESSURE: 110 MMHG | RESPIRATION RATE: 16 BRPM | HEART RATE: 113 BPM | DIASTOLIC BLOOD PRESSURE: 70 MMHG | HEIGHT: 69 IN | WEIGHT: 160 LBS | OXYGEN SATURATION: 97 % | TEMPERATURE: 98.4 F | BODY MASS INDEX: 23.7 KG/M2

## 2017-11-08 DIAGNOSIS — Z23 NEED FOR PROPHYLACTIC VACCINATION AND INOCULATION AGAINST INFLUENZA: ICD-10-CM

## 2017-11-08 DIAGNOSIS — M54.16 LUMBAR RADICULOPATHY: ICD-10-CM

## 2017-11-08 DIAGNOSIS — F51.01 PRIMARY INSOMNIA: ICD-10-CM

## 2017-11-08 DIAGNOSIS — Z01.818 PREOP GENERAL PHYSICAL EXAM: Primary | ICD-10-CM

## 2017-11-08 DIAGNOSIS — S99.922A INJURY OF LEFT FOOT, INITIAL ENCOUNTER: ICD-10-CM

## 2017-11-08 DIAGNOSIS — R05.9 COUGH: Primary | ICD-10-CM

## 2017-11-08 DIAGNOSIS — M51.369 L4-L5 DISC BULGE: ICD-10-CM

## 2017-11-08 LAB — HGB BLD-MCNC: 13.2 G/DL (ref 11.7–15.7)

## 2017-11-08 PROCEDURE — 85018 HEMOGLOBIN: CPT | Performed by: FAMILY MEDICINE

## 2017-11-08 PROCEDURE — 90686 IIV4 VACC NO PRSV 0.5 ML IM: CPT | Performed by: FAMILY MEDICINE

## 2017-11-08 PROCEDURE — 80053 COMPREHEN METABOLIC PANEL: CPT | Performed by: FAMILY MEDICINE

## 2017-11-08 PROCEDURE — 36415 COLL VENOUS BLD VENIPUNCTURE: CPT | Performed by: FAMILY MEDICINE

## 2017-11-08 PROCEDURE — 99214 OFFICE O/P EST MOD 30 MIN: CPT | Mod: 25 | Performed by: FAMILY MEDICINE

## 2017-11-08 PROCEDURE — 73630 X-RAY EXAM OF FOOT: CPT | Mod: LT

## 2017-11-08 PROCEDURE — 90471 IMMUNIZATION ADMIN: CPT | Performed by: FAMILY MEDICINE

## 2017-11-08 PROCEDURE — 93000 ELECTROCARDIOGRAM COMPLETE: CPT | Performed by: FAMILY MEDICINE

## 2017-11-08 RX ORDER — TRAZODONE HYDROCHLORIDE 100 MG/1
100 TABLET ORAL
Qty: 90 TABLET | Refills: 3 | Status: ON HOLD | OUTPATIENT
Start: 2017-11-08 | End: 2017-11-21

## 2017-11-08 NOTE — PROGRESS NOTES
[facility]  [address]  [facility] [address] [phone]  Dept: [phone]    PRE-OP EVALUATION:  Today's date: 2017    Sima Ross (: 1965) presents for pre-operative evaluation assessment as requested by Dr. Edilberto Contreras.  She requires evaluation and anesthesia risk assessment prior to undergoing surgery/procedure for treatment of back pain .  Proposed procedure: spinal cord stimulator    Date of Surgery/ Procedure: /  Time of Surgery/ Procedure: 17  Hospital/Surgical Facility: [facility]  Fax number for surgical facility: [phone]  Primary Physician: Jessica Flores  Type of Anesthesia Anticipated: to be determined    Patient has a Health Care Directive or Living Will:  NO    1. NO - Do you have a history of heart attack, stroke, stent, bypass or surgery on an artery in the head, neck, heart or legs?  2. NO - Do you ever have any pain or discomfort in your chest?  3. NO - Do you have a history of  Heart Failure?  4. NO - Are you troubled by shortness of breath when: walking on the level, up a slight hill or at night?  5. YES - Do you currently have a cold, bronchitis or other respiratory infection? Has slight cough today due to a viral URI  6. NO - Do you have a cough, shortness of breath or wheezing?  7. NO - Do you sometimes get pains in the calves of your legs when you walk?  8. NO - Do you or anyone in your family have previous history of blood clots?  9. NO - Do you or does anyone in your family have a serious bleeding problem such as prolonged bleeding following surgeries or cuts?  10. Yes - Have you ever had problems with anemia or been told to take iron pills? Had remote anemia with her heavy periods - now has hysterectomy  11. yes - Have you had any abnormal blood loss such as black, tarry or bloody stools, or abnormal vaginal bleeding?  12. yes - Have you ever had a blood transfusion?  13. NO - Have you or any of your  relatives ever had problems with anesthesia?  14. NO - Do you have sleep apnea, excessive snoring or daytime drowsiness?  15. NO - Do you have any prosthetic heart valves?  16. yes - Do you have prosthetic joints? Ankle plate  17. NO - Is there any chance that you may be pregnant?        HPI:                                                      Brief HPI related to upcoming procedure: implantation of permanent nerve stimulator for chronic back pain L4-5    Additionally, patient tripped over her dog and fell landing on her left lateral foot and right medial knee. This was a few days ago. She knows a lot of bruising at the right knee but was able to weight-bear on this leg. The left foot was significantly bruised and swollen and tender and she could not weight-bear. Feels that she needs crutches for this  Is still having swelling despite ice and elevation    See problem list for active medical problems.  Problems all longstanding and stable, except as noted/documented.  See ROS for pertinent symptoms related to these conditions.                                                                                                  .    MEDICAL HISTORY:                                                    Patient Active Problem List    Diagnosis Date Noted     Midline low back pain without sciatica 04/18/2017     Priority: Medium     Orthopedic aftercare 04/18/2017     Priority: Medium     Migraine without aura and without status migrainosus, not intractable 01/21/2016     Priority: Medium     Hypertension goal BP (blood pressure) < 140/90 12/08/2015     Priority: Medium     Anxiety state 09/14/2015     Priority: Medium     Problem list name updated by automated process. Provider to review       Major depressive disorder, recurrent episode, moderate (H) 09/14/2015     Priority: Medium     Lumbar radiculopathy 07/09/2015     Priority: Medium     Possible arachnoiditis sees scanned notes from [facility]       Ulcerative colitis  without complications (H) 07/09/2015     Priority: Medium     Diagnosis updated by automated process. Provider to review and confirm.       L4-L5 disc bulge 07/09/2015     Priority: Medium      No past medical history on file.  Past Surgical History:   Procedure Laterality Date     DISCECTOMY CERVICAL MINIMALLY INVASIVE ONE LEVEL  7/2009     HYSTERECTOMY  7/2014     LAPAROSCOPIC HYSTERECTOMY TOTAL  2013     Current Outpatient Prescriptions   Medication Sig Dispense Refill     traZODone (DESYREL) 100 MG tablet Take 1 tablet (100 mg) by mouth nightly as needed for sleep 90 tablet 0     valACYclovir (VALTREX) 500 MG tablet TAKE 1 TABLET DAILY 30 tablet 0     lisinopril (PRINIVIL/ZESTRIL) 10 MG tablet HOLD 90 tablet 2     estradiol (ESTRACE) 1 MG tablet Take 1 tablet (1 mg) by mouth daily 90 tablet 2     tiZANidine (ZANAFLEX) 4 MG tablet Take 2 tablets (8 mg) by mouth 3 times daily 160 tablet prn     oxyCODONE-acetaminophen (PERCOCET) 5-325 MG per tablet        dicyclomine (BENTYL) 20 MG tablet Take 1 tablet (20 mg) by mouth every 6 hours 360 tablet 3     traZODone (DESYREL) 50 MG tablet Take 2 tablets (100 mg) by mouth nightly as needed for sleep 180 tablet 3     rizatriptan (MAXALT) 10 MG tablet Take 1 tablet (10 mg) by mouth at onset of headache for migraine May repeat dose in 2 hours.  Do not exceed 30 mg in 24 hours 18 tablet 11     triamterene-hydrochlorothiazide (MAXZIDE-25) 37.5-25 MG per tablet Take 1 tablet by mouth daily 90 tablet 3     OTC products: none    Allergies   Allergen Reactions     Atenolol Other (See Comments)     Keflex [Cephalexin] Rash      Latex Allergy: NO    Social History   Substance Use Topics     Smoking status: Former Smoker     Quit date: 11/15/2015     Smokeless tobacco: Never Used     Alcohol use Yes      Comment: 2x per month     History   Drug Use No       REVIEW OF SYSTEMS:                                                    Constitutional, neuro, ENT, endocrine, pulmonary, cardiac,  "gastrointestinal, genitourinary, musculoskeletal, integument and psychiatric systems are negative, except as otherwise noted.      EXAM:                                                    /70 (BP Location: Left arm, Cuff Size: Adult Regular)  Pulse 113  Temp 98.4  F (36.9  C) (Oral)  Resp 16  Ht 5' 9\" (1.753 m)  Wt 160 lb (72.6 kg)  SpO2 97%  BMI 23.63 kg/m2    GENERAL APPEARANCE: healthy, alert and no distress     EYES: EOMI, PERRL     HENT: ear canals and TM's normal and nose and mouth without ulcers or lesions     NECK: no adenopathy, no asymmetry, masses, or scars and thyroid normal to palpation     RESP: lungs clear to auscultation - no rales, rhonchi or wheezes     CV: regular rates and rhythm, normal S1 S2, no S3 or S4 and no murmur, click or rub     MS: limited ROM at LS spine due to pain  Left foot has pain along dorsum and slightly swelling     SKIN: no suspicious lesions or rashes     NEURO: Normal strength and tone, sensory exam grossly normal, mentation intact and speech normal     PSYCH: mentation appears normal. and affect normal/bright     LYMPHATICS: No axillary, cervical, or supraclavicular nodes    DIAGNOSTICS:                                                    EKG: appears normal, NSR, normal axis, normal intervals, no acute ST/T changes c/w ischemia, no LVH by voltage criteria, unchanged from previous tracings, see recent scans    Recent Labs   Lab Test  08/30/17   1309  04/05/17   1108  01/25/17   1317  12/05/16   1155  01/22/16   HGB  12.9  14.0  12.6  15.8*   < >   --    PLT   --   249  248   --    --    --    NA   --    --    --   140   --   140   POTASSIUM   --    --    --   4.3   --   3.1   CR   --    --    --   0.90   --   1.02    < > = values in this interval not displayed.      Xray foot shows: normal findings -  IMPRESSION:                                                    Reason for surgery/procedure: lumbar disc herniation and pain  Diagnosis/reason for consult: plans " for implantation of nerve stimulator    The proposed surgical procedure is considered LOW risk.    REVISED CARDIAC RISK INDEX  The patient has the following serious cardiovascular risks for perioperative complications such as (MI, PE, VFib and 3  AV Block):  No serious cardiac risks  INTERPRETATION: 0 risks: Class I (very low risk - 0.4% complication rate)    The patient has the following additional risks for perioperative complications:  No identified additional risks      ICD-10-CM    1. Preop general physical exam Z01.818    2. L4-L5 disc bulge M51.26    3. Injury of left foot, initial encounter S99.922A XR Foot Left G/E 3 Views   4. Need for prophylactic vaccination and inoculation against influenza Z23 HC FLU VAC PRESRV FREE QUAD SPLIT VIR 3+YRS IM   5. Primary insomnia F51.01 traZODone (DESYREL) 100 MG tablet     EKG NSR  RECOMMENDATIONS:                                                    (Z01.818) Preop general physical exam  (primary encounter diagnosis)  Comment:   Plan: Comprehensive metabolic panel, Hemoglobin, EKG         12-lead complete w/read - Clinics            (M51.26) L4-L5 disc bulge  Comment:   Plan:     (S99.922A) Injury of left foot, initial encounter  Comment: crutches prescribed  Rest ice and will contact with radiology interpretation  Plan: XR Foot Left G/E 3 Views, order for DME            (Z23) Need for prophylactic vaccination and inoculation against influenza  Comment:   Plan: HC FLU VAC PRESRV FREE QUAD SPLIT VIR 3+YRS IM            (F51.01) Primary insomnia  Comment: refills  Plan: traZODone (DESYREL) 100 MG tablet            (M54.16) Lumbar radiculopathy  Comment:   Plan:        --Patient is to take all scheduled medications on the day of surgery EXCEPT for modifications listed below.    APPROVAL GIVEN to proceed with proposed procedure, without further diagnostic evaluation       Signed Electronically by: Jessica Flores MD    Copy of this evaluation report is provided to  requesting physician.    [place] Preop Guidelines

## 2017-11-08 NOTE — TELEPHONE ENCOUNTER
Pt requesting albuterol inhaler for her cough.  Send to [facility] on [address]  Pt can be reached at # [phone]

## 2017-11-08 NOTE — Clinical Note
Could you fax over the pre-op and remind her to call for the mammogram see avs for numbers  Thanks Sn

## 2017-11-08 NOTE — PATIENT INSTRUCTIONS
Before Your Surgery      Call your surgeon if there is any change in your health. This includes signs of a cold or flu (such as a sore throat, runny nose, cough, rash or fever).    Do not smoke, drink alcohol or take over the counter medicine (unless your surgeon or primary care doctor tells you to) for the 24 hours before and after surgery.    If you take prescribed drugs: Follow your doctor s orders about which medicines to take and which to stop until after surgery.    Eating and drinking prior to surgery: follow the instructions from your surgeon    Take a shower or bath the night before surgery. Use the soap your surgeon gave you to gently clean your skin. If you do not have soap from your surgeon, use your regular soap. Do not shave or scrub the surgery site.  Wear clean pajamas and have clean sheets on your bed.     PLEASE CALL TO SCHEDULE YOUR MAMMOGRAM  [facility], [facility] [phone]  [facility] [phone]

## 2017-11-08 NOTE — LETTER
November 10, 2017    Sima Ross  [address]  [facility] [address]      Dear Sima,    Your protein levels are a tiny bit on the low side.  After surgery and once you are feeilng less pain lets tyrese into this.  For now lets make sure you are eating a healthy diet and perhaps take a look at the urine to make sure it is not spilling there.     See me for a follow up visit in a few weeks after the procedure     Thank you very much for choosing [facility]. Please call my office if you have any questions or concerns.      Sincerely,        Jessica Flores MD  Resulted Orders   Comprehensive metabolic panel   Result Value Ref Range    Sodium 139 133 - 144 mmol/L    Potassium 3.7 3.4 - 5.3 mmol/L    Chloride 104 94 - 109 mmol/L    Carbon Dioxide 26 20 - 32 mmol/L    Anion Gap 9 3 - 14 mmol/L    Glucose 90 70 - 99 mg/dL    Urea Nitrogen 12 7 - 30 mg/dL    Creatinine 0.71 0.52 - 1.04 mg/dL    GFR Estimate 87 >60 mL/min/1.7m2      Comment:      Non  GFR Calc    GFR Estimate If Black >90 >60 mL/min/1.7m2      Comment:       GFR Calc    Calcium 9.0 8.5 - 10.1 mg/dL    Bilirubin Total 0.3 0.2 - 1.3 mg/dL    Albumin 3.3 (L) 3.4 - 5.0 g/dL    Protein Total 6.6 (L) 6.8 - 8.8 g/dL    Alkaline Phosphatase 105 40 - 150 U/L    ALT 19 0 - 50 U/L    AST 10 0 - 45 U/L   Hemoglobin   Result Value Ref Range    Hemoglobin 13.2 11.7 - 15.7 g/dL

## 2017-11-08 NOTE — NURSING NOTE
"Chief Complaint   Patient presents with     Pre-Op Exam     Ankle/Foot left     tripped over her dog saturday     initial /70 (BP Location: Left arm, Cuff Size: Adult Regular)  Pulse 113  Temp 98.4  F (36.9  C) (Oral)  Resp 16  Ht 5' 9\" (1.753 m)  Wt 160 lb (72.6 kg)  SpO2 97%  BMI 23.63 kg/m2 Estimated body mass index is 23.63 kg/(m^2) as calculated from the following:    Height as of this encounter: 5' 9\" (1.753 m).    Weight as of this encounter: 160 lb (72.6 kg).  BP completed using cuff size: regular.  L  arm      Health Maintenance that is potentially due pending provider review:  Mammogram, Pap Smear and Colonoscopy/FIT will do when she is feeling better    PAP--Possibly completing today, per Provider review and MAMMO/COLON--Gave pt phone number, and pended order for Mammo and/or Colonoscopy    Srikanth Castanon ma  "

## 2017-11-08 NOTE — MR AVS SNAPSHOT
After Visit Summary   11/8/2017    Sima Ross    MRN: [id number]           Patient Information     Date Of Birth          1965        Visit Information        Provider Department      11/8/2017 11:00 AM Jessica Flores MD [facility]        Today's Diagnoses     Preop general physical exam    -  1    L4-L5 disc bulge        Injury of left foot, initial encounter        Need for prophylactic vaccination and inoculation against influenza        Primary insomnia        Lumbar radiculopathy          Care Instructions      Before Your Surgery      Call your surgeon if there is any change in your health. This includes signs of a cold or flu (such as a sore throat, runny nose, cough, rash or fever).    Do not smoke, drink alcohol or take over the counter medicine (unless your surgeon or primary care doctor tells you to) for the 24 hours before and after surgery.    If you take prescribed drugs: Follow your doctor s orders about which medicines to take and which to stop until after surgery.    Eating and drinking prior to surgery: follow the instructions from your surgeon    Take a shower or bath the night before surgery. Use the soap your surgeon gave you to gently clean your skin. If you do not have soap from your surgeon, use your regular soap. Do not shave or scrub the surgery site.  Wear clean pajamas and have clean sheets on your bed.     PLEASE CALL TO SCHEDULE YOUR MAMMOGRAM  [facility] [phone]  [facility] [phone]             Follow-ups after your visit        Your next 10 appointments already scheduled     Nov 21, 2017   Procedure with Geno Domínguez DO   [facility] (--)    [address]  [facility] [address] [phone]              Who to contact     If you have questions or need follow up information about today's clinic visit or your schedule please contact [facility] directly at [phone].  Normal or non-critical lab and imaging results will be communicated to you by MyChart, letter or phone within 4 business days after the clinic has received the results. If you do not hear from us within 7 days, please contact the clinic through MyChart or phone. If you have a critical or abnormal lab result, we will notify you by phone as soon as possible.  Submit refill requests through [facility] or call your pharmacy and they will forward the refill request to us. Please allow 3 business days for your refill to be completed.          Additional Information About Your Visit        [facility] Information     [facility] lets you send messages to your doctor, view your test results, renew your prescriptions, schedule appointments and more. To sign up, go to [email or web address] . Click on \"Log in\" on the left side of the screen, which will take you to the Welcome page. Then click on \"Sign up Now\" on the right side of the page.     You will be asked to enter the access code listed below, as well as some personal information. Please follow the directions to create your username and password.     Your access code is: [email or web address]  Expires: 2017 12:01 PM     Your access code will  in 90 days. If you need help or a new code, please call your [facility] clinic or [phone].        Care EveryWhere ID     This is your Care EveryWhere ID. This could be used by other organizations to access your [facility] medical records  [id number]        Your Vitals Were     Pulse Temperature Respirations Height Pulse Oximetry BMI (Body Mass Index)    113 98.4  F (36.9  C) (Oral) 16 5' 9\" (1.753 m) 97% 23.63 kg/m2       Blood Pressure from Last 3 Encounters:   17 110/70   17 124/80   17 104/64    Weight from Last 3 Encounters:   17 160 lb (72.6 kg)   17 163 lb 12.8 oz (74.3 kg)   17 179 lb (81.2 kg)              We Performed the Following     Comprehensive " metabolic panel     EKG 12-lead complete w/read - Clinics     HC FLU VAC PRESRV FREE QUAD SPLIT VIR 3+YRS IM     Hemoglobin          Today's Medication Changes          These changes are accurate as of: 11/8/17  1:48 PM.  If you have any questions, ask your nurse or doctor.               Start taking these medicines.        Dose/Directions    order for DME   Used for:  Injury of left foot, initial encounter   Started by:  Jessica Flores MD        crutches   Quantity:  1 Device   Refills:  0         These medicines have changed or have updated prescriptions.        Dose/Directions    * traZODone 100 MG tablet   Commonly known as:  DESYREL   This may have changed:  Another medication with the same name was changed. Make sure you understand how and when to take each.   Used for:  Anxiety state   Changed by:  Jessica Flores MD        Dose:  100 mg   Take 1 tablet (100 mg) by mouth nightly as needed for sleep   Quantity:  90 tablet   Refills:  0       * traZODone 100 MG tablet   Commonly known as:  DESYREL   This may have changed:  medication strength   Used for:  Primary insomnia   Changed by:  Jessica Flores MD        Dose:  100 mg   Take 1 tablet (100 mg) by mouth nightly as needed for sleep   Quantity:  90 tablet   Refills:  3       * Notice:  This list has 2 medication(s) that are the same as other medications prescribed for you. Read the directions carefully, and ask your doctor or other care provider to review them with you.         Where to get your medicines      These medications were sent to [facility]/pharmacy #[id number] - [address] AT CORNER OF [address] [place] [address]     Phone:  [phone]     traZODone 100 MG tablet         Some of these will need a paper prescription and others can be bought over the counter.  Ask your nurse if you have questions.     Bring a paper prescription for each of these medications     order for DME                 Primary Care Provider Office Phone # Fax #    HawthorneMadeleine Flores [phone] [address] [address]        Equal Access to Services     PRESTON CAM : Carla rose kenzie Sosaad, waaxda luqadaha, qaybta kaalmada ademimi, heena mascorro pennieleonardo kendrick hector aguilar. So [facility] [phone].    ATENCIÓN: Si habla español, tiene a rogers disposición servicios gratuitos de asistencia lingüística. Llame al [phone].    We comply with applicable federal civil rights laws and [place] laws. We do not discriminate on the basis of race, color, national origin, age, disability, sex, sexual orientation, or gender identity.            Thank you!     Thank you for choosing [facility]  for your care. Our goal is always to provide you with excellent care. Hearing back from our patients is one way we can continue to improve our services. Please take a few minutes to complete the written survey that you may receive in the mail after your visit with us. Thank you!             Your Updated Medication List - Protect others around you: Learn how to safely use, store and throw away your medicines at www.disposemymeds.org.          This list is accurate as of: 11/8/17  1:48 PM.  Always use your most recent med list.                   Brand Name Dispense Instructions for use Diagnosis    dicyclomine 20 MG tablet    BENTYL    360 tablet    Take 1 tablet (20 mg) by mouth every 6 hours    L4-L5 disc bulge, Lumbar radiculopathy, Essential hypertension with goal blood pressure less than 140/90, Primary insomnia, Ulcerative colitis without complications, unspecified location (H)       estradiol 1 MG tablet    ESTRACE    90 tablet    Take 1 tablet (1 mg) by mouth daily    Hormone replacement therapy (HRT)       lisinopril 10 MG tablet    PRINIVIL/ZESTRIL    90 tablet    HOLD    Essential hypertension with goal blood pressure less than 140/90       order for DME     1 Device     crutches    Injury of left foot, initial encounter       oxyCODONE-acetaminophen 5-325 MG per tablet    PERCOCET          rizatriptan 10 MG tablet    MAXALT    18 tablet    Take 1 tablet (10 mg) by mouth at onset of headache for migraine May repeat dose in 2 hours.  Do not exceed 30 mg in 24 hours    Migraine without aura and without status migrainosus, not intractable       tiZANidine 4 MG tablet    ZANAFLEX    160 tablet    Take 2 tablets (8 mg) by mouth 3 times daily    L4-L5 disc bulge       * traZODone 100 MG tablet    DESYREL    90 tablet    Take 1 tablet (100 mg) by mouth nightly as needed for sleep    Anxiety state       * traZODone 100 MG tablet    DESYREL    90 tablet    Take 1 tablet (100 mg) by mouth nightly as needed for sleep    Primary insomnia       valACYclovir 500 MG tablet    VALTREX    30 tablet    TAKE 1 TABLET DAILY    Herpes simplex infection of genitourinary system       * Notice:  This list has 2 medication(s) that are the same as other medications prescribed for you. Read the directions carefully, and ask your doctor or other care provider to review them with you.

## 2017-11-09 ENCOUNTER — TELEPHONE (OUTPATIENT)
Dept: FAMILY MEDICINE | Facility: CLINIC | Age: 52
End: 2017-11-09

## 2017-11-09 DIAGNOSIS — S92.525A CLOSED NONDISPLACED FRACTURE OF MIDDLE PHALANX OF LESSER TOE OF LEFT FOOT, INITIAL ENCOUNTER: Primary | ICD-10-CM

## 2017-11-09 LAB
ALBUMIN SERPL-MCNC: 3.3 G/DL (ref 3.4–5)
ALP SERPL-CCNC: 105 U/L (ref 40–150)
ALT SERPL W P-5'-P-CCNC: 19 U/L (ref 0–50)
ANION GAP SERPL CALCULATED.3IONS-SCNC: 9 MMOL/L (ref 3–14)
AST SERPL W P-5'-P-CCNC: 10 U/L (ref 0–45)
BILIRUB SERPL-MCNC: 0.3 MG/DL (ref 0.2–1.3)
BUN SERPL-MCNC: 12 MG/DL (ref 7–30)
CALCIUM SERPL-MCNC: 9 MG/DL (ref 8.5–10.1)
CHLORIDE SERPL-SCNC: 104 MMOL/L (ref 94–109)
CO2 SERPL-SCNC: 26 MMOL/L (ref 20–32)
CREAT SERPL-MCNC: 0.71 MG/DL (ref 0.52–1.04)
GFR SERPL CREATININE-BSD FRML MDRD: 87 ML/MIN/1.7M2
GLUCOSE SERPL-MCNC: 90 MG/DL (ref 70–99)
POTASSIUM SERPL-SCNC: 3.7 MMOL/L (ref 3.4–5.3)
PROT SERPL-MCNC: 6.6 G/DL (ref 6.8–8.8)
SODIUM SERPL-SCNC: 139 MMOL/L (ref 133–144)

## 2017-11-09 RX ORDER — ALBUTEROL SULFATE 90 UG/1
2 AEROSOL, METERED RESPIRATORY (INHALATION) EVERY 6 HOURS PRN
Qty: 1 INHALER | Refills: 0 | Status: SHIPPED | OUTPATIENT
Start: 2017-11-09 | End: 2018-01-26

## 2017-11-09 NOTE — TELEPHONE ENCOUNTER
Reason for Call:  Other results    Detailed comments: patient is calling for results of xray of foot from 11/8.    Phone Number Patient can be reached at: Home number on file [phone] (home)    Best Time: any    Can we leave a detailed message on this number? YES    Call taken on 11/9/2017 at 1:17 PM by Amber Baez

## 2017-11-09 NOTE — TELEPHONE ENCOUNTER
Patient informed and given referral info in case she doesn't hear from ortho  Also scheduled her for nurse only appt tomorrow - she would like to come to [place] for shawanda CHU RN

## 2017-11-09 NOTE — TELEPHONE ENCOUNTER
Her xray does show a fracture along the 4th and 5th tarsals the xray was slightly blurred so there might be a slight displacement.  For this I would recommend a flat shoe - can pick this up here or at medical supply store and can have her  see ortho for follow up - I don't think this needs correction but they might recommend a boot.    I'll place referral    Jessica Flores MD

## 2017-11-09 NOTE — TELEPHONE ENCOUNTER
SN  Patient called.  Saw you yesterday for pre-op.  States you were going to send in an Rx for Albuterol inhaler for her cough.  Does not look like it was sent to pharmacy.  Order T'd up.  Please advise.  Thanks, Gayle Slater, LELA      Triage note:  Call patient and let her when Rx has been sent to pharmacy

## 2017-11-10 ENCOUNTER — ALLIED HEALTH/NURSE VISIT (OUTPATIENT)
Dept: NURSING | Facility: CLINIC | Age: 52
End: 2017-11-10

## 2017-11-10 DIAGNOSIS — Z47.89 ORTHOPEDIC AFTERCARE: Primary | ICD-10-CM

## 2017-11-10 NOTE — PROGRESS NOTES
I called the patient about these results. See telephone result notes  Arranged to have her f/u with ortho    SN

## 2017-11-10 NOTE — PROGRESS NOTES
Please send this patient a normal results letter    Your protein levels are a tiny bit on the low side.  After surgery and once you are feeilng less pain lets tyrese into this.  For now lets make sure you are eating a healthy diet and perhaps take a look at the urine to make sure it is not spilling there.    See me for a follow up visit in a few weeks after the procedure    Thank you!    Jessica

## 2017-11-14 ENCOUNTER — TRANSFERRED RECORDS (OUTPATIENT)
Dept: HEALTH INFORMATION MANAGEMENT | Facility: CLINIC | Age: 52
End: 2017-11-14

## 2017-11-16 ENCOUNTER — OFFICE VISIT (OUTPATIENT)
Dept: ORTHOPEDICS | Facility: CLINIC | Age: 52
End: 2017-11-16
Payer: COMMERCIAL

## 2017-11-16 ENCOUNTER — RADIANT APPOINTMENT (OUTPATIENT)
Dept: GENERAL RADIOLOGY | Facility: CLINIC | Age: 52
End: 2017-11-16
Attending: PEDIATRICS
Payer: COMMERCIAL

## 2017-11-16 VITALS
WEIGHT: 160 LBS | SYSTOLIC BLOOD PRESSURE: 97 MMHG | HEIGHT: 69 IN | DIASTOLIC BLOOD PRESSURE: 61 MMHG | BODY MASS INDEX: 23.7 KG/M2

## 2017-11-16 DIAGNOSIS — M79.672 LEFT FOOT PAIN: ICD-10-CM

## 2017-11-16 DIAGNOSIS — S92.345A: ICD-10-CM

## 2017-11-16 DIAGNOSIS — S92.335A: Primary | ICD-10-CM

## 2017-11-16 PROCEDURE — 73630 X-RAY EXAM OF FOOT: CPT | Mod: LT

## 2017-11-16 PROCEDURE — 99244 OFF/OP CNSLTJ NEW/EST MOD 40: CPT | Performed by: PEDIATRICS

## 2017-11-16 NOTE — LETTER
11/16/2017         RE: Sima Ross  [address]  [facility] [address]        Dear Colleague,    Thank you for referring your patient, Sima Ross, to the [facility]. Please see a copy of my visit note below.    Sports Medicine Clinic Visit    PCP: Jessica Flores    Sima Ross is a 52 year old female who is seen  in consultation at the request of  Jessica Flores M.D. presenting with left foot injury.    Injury: Patient's dog knocked her over and she rolled over her left foot around 1 week ago.    Location of Pain: left foot 3rd - 5th digits  Duration of Pain: ~1 week(s)  Rating of Pain at worst: 8/10  Rating of Pain Currently: 3/10  Symptoms are better with: Other medications and walking shoe  Symptoms are worse with: Full weight bearing  Additional Features:   Positive: Pain   Negative: popping, grinding, catching, locking, instability, paresthesias, numbness and weakness  Other evaluation and/or treatments so far consists of: Other medications and walking shoe  Prior History of related problems: None    Social History: Off work due to other medial problems    Review of Systems  Skin: yes bruising, yes swelling  Musculoskeletal: as above  Neurologic: no numbness, paresthesias  Remainder of review of systems is negative including constitutional, CV, pulmonary, GI, except as noted in HPI or medical history.    Patient's current problem list, past medical and surgical history, and family history were reviewed.    Patient Active Problem List   Diagnosis     Lumbar radiculopathy     Ulcerative colitis without complications (H)     L4-L5 disc bulge     Anxiety state     Major depressive disorder, recurrent episode, moderate (H)     Hypertension goal BP (blood pressure) < 140/90     Migraine without aura and without status migrainosus, not intractable     Midline low back pain without sciatica     Orthopedic aftercare     Primary insomnia     No  "past medical history on file.  Past Surgical History:   Procedure Laterality Date     DISCECTOMY CERVICAL MINIMALLY INVASIVE ONE LEVEL  7/2009     HYSTERECTOMY  7/2014     LAPAROSCOPIC HYSTERECTOMY TOTAL  2013     Family History   Problem Relation Age of Onset     Hypertension Mother      Thyroid Disease Mother      Hypertension Maternal Grandmother      Hypertension Paternal Grandfather      Breast Cancer Paternal Grandmother      Breast Cancer Maternal Aunt          Objective  BP 97/61  Ht 5' 9\" (1.753 m)  Wt 160 lb (72.6 kg)  BMI 23.63 kg/m2    GENERAL APPEARANCE: healthy, alert and no distress   GAIT: antalgic  SKIN: no suspicious lesions or rashes  HEENT: Sclera clear, anicteric  CV: good peripheral pulses  RESP: Breathing not labored  NEURO: Normal strength and tone, mentation intact and speech normal  PSYCH:  mentation appears normal and affect normal/bright    Bilateral Foot and Ankle Exam:  Inspection:       edema noted mild throughout left foot    Foot inspection:       no deformity noted    Tender:       3rd and 4th MTP joints, less over 5th MTP joint    Non-Tender:       remainder of ankle and foot left    ROM:        Full active and passive ROM, ankle dorsiflexion, plantarflexion, inversion, eversion, great toe dorsiflexion, remainder of toes, midfoot and subtalar left    Strength: - some pain with strength testing       ankle dorsiflexion 5/5 bilateral       plantarflexion 5/5 bilateral       inversion 5/5 bilateral       eversion 5/5 bilateral    Gait:       antalgic gait    Neurovascular:       2+ peripheral pulses bilaterally and brisk capillary refill       sensation grossly intact      Radiology  I ordered, visualized and reviewed these images with the patient  3 views of left foot show minimally displaced fracture of 3rd and 4th metatarsal heads, some degenerative change at 5th metatarsal head, possible fracture as well  - will follow official read    Assessment:  1. Nondisp fx of third " metatarsal bone, left foot, init    2. Nondisp fx of fourth metatarsal bone, left foot, init      Multiple non-displaced metatarsal fractures.  Will transition to a short boot and recommended non weight bearing on crutches as well.  Given multiple fractures, I recommend follow up with Podiatry for monitoring.    Plan:  - Today's Plan of Care:  Referral to podiatry  Short boot  Non weight bearing on crutches    Follow Up: With podiatry    Concerning signs and symptoms were reviewed.  The patient expressed understanding of this management plan and all questions were answered at this time.    Thanks for the opportunity to participate in the care of this patient, I will keep you updated on their progress.    CC: Jessica Yanez MD [place]  Primary Care Sports Medicine  [facility]    Again, thank you for allowing me to participate in the care of your patient.        Sincerely,        Jessica Yanez MD

## 2017-11-16 NOTE — H&P (VIEW-ONLY)
[facility]  [address] [place]  [facility] [address] [phone]  Dept: [phone]    PRE-OP EVALUATION:  Today's date: 2017    Sima Ross (: 1965) presents for pre-operative evaluation assessment as requested by Dr. Edilberto Contreras.  She requires evaluation and anesthesia risk assessment prior to undergoing surgery/procedure for treatment of back pain .  Proposed procedure: spinal cord stimulator    Date of Surgery/ Procedure: /  Time of Surgery/ Procedure: 17  Hospital/Surgical Facility: [facility]  Fax number for surgical facility: [phone]  Primary Physician: Jessica Flores  Type of Anesthesia Anticipated: to be determined    Patient has a Health Care Directive or Living Will:  NO    1. NO - Do you have a history of heart attack, stroke, stent, bypass or surgery on an artery in the head, neck, heart or legs?  2. NO - Do you ever have any pain or discomfort in your chest?  3. NO - Do you have a history of  Heart Failure?  4. NO - Are you troubled by shortness of breath when: walking on the level, up a slight hill or at night?  5. YES - Do you currently have a cold, bronchitis or other respiratory infection? Has slight cough today due to a viral URI  6. NO - Do you have a cough, shortness of breath or wheezing?  7. NO - Do you sometimes get pains in the calves of your legs when you walk?  8. NO - Do you or anyone in your family have previous history of blood clots?  9. NO - Do you or does anyone in your family have a serious bleeding problem such as prolonged bleeding following surgeries or cuts?  10. Yes - Have you ever had problems with anemia or been told to take iron pills? Had remote anemia with her heavy periods - now has hysterectomy  11. yes - Have you had any abnormal blood loss such as black, tarry or bloody stools, or abnormal vaginal bleeding?  12. yes - Have you ever had a blood transfusion?  13. NO - Have you or any of your  relatives ever had problems with anesthesia?  14. NO - Do you have sleep apnea, excessive snoring or daytime drowsiness?  15. NO - Do you have any prosthetic heart valves?  16. yes - Do you have prosthetic joints? Ankle plate  17. NO - Is there any chance that you may be pregnant?        HPI:                                                      Brief HPI related to upcoming procedure: implantation of permanent nerve stimulator for chronic back pain L4-5    Additionally, patient tripped over her dog and fell landing on her left lateral foot and right medial knee. This was a few days ago. She knows a lot of bruising at the right knee but was able to weight-bear on this leg. The left foot was significantly bruised and swollen and tender and she could not weight-bear. Feels that she needs crutches for this  Is still having swelling despite ice and elevation    See problem list for active medical problems.  Problems all longstanding and stable, except as noted/documented.  See ROS for pertinent symptoms related to these conditions.                                                                                                  .    MEDICAL HISTORY:                                                    Patient Active Problem List    Diagnosis Date Noted     Midline low back pain without sciatica 04/18/2017     Priority: Medium     Orthopedic aftercare 04/18/2017     Priority: Medium     Migraine without aura and without status migrainosus, not intractable 01/21/2016     Priority: Medium     Hypertension goal BP (blood pressure) < 140/90 12/08/2015     Priority: Medium     Anxiety state 09/14/2015     Priority: Medium     Problem list name updated by automated process. Provider to review       Major depressive disorder, recurrent episode, moderate (H) 09/14/2015     Priority: Medium     Lumbar radiculopathy 07/09/2015     Priority: Medium     Possible arachnoiditis sees scanned notes from [facility]       Ulcerative colitis  without complications (H) 07/09/2015     Priority: Medium     Diagnosis updated by automated process. Provider to review and confirm.       L4-L5 disc bulge 07/09/2015     Priority: Medium      No past medical history on file.  Past Surgical History:   Procedure Laterality Date     DISCECTOMY CERVICAL MINIMALLY INVASIVE ONE LEVEL  7/2009     HYSTERECTOMY  7/2014     LAPAROSCOPIC HYSTERECTOMY TOTAL  2013     Current Outpatient Prescriptions   Medication Sig Dispense Refill     traZODone (DESYREL) 100 MG tablet Take 1 tablet (100 mg) by mouth nightly as needed for sleep 90 tablet 0     valACYclovir (VALTREX) 500 MG tablet TAKE 1 TABLET DAILY 30 tablet 0     lisinopril (PRINIVIL/ZESTRIL) 10 MG tablet HOLD 90 tablet 2     estradiol (ESTRACE) 1 MG tablet Take 1 tablet (1 mg) by mouth daily 90 tablet 2     tiZANidine (ZANAFLEX) 4 MG tablet Take 2 tablets (8 mg) by mouth 3 times daily 160 tablet prn     oxyCODONE-acetaminophen (PERCOCET) 5-325 MG per tablet        dicyclomine (BENTYL) 20 MG tablet Take 1 tablet (20 mg) by mouth every 6 hours 360 tablet 3     traZODone (DESYREL) 50 MG tablet Take 2 tablets (100 mg) by mouth nightly as needed for sleep 180 tablet 3     rizatriptan (MAXALT) 10 MG tablet Take 1 tablet (10 mg) by mouth at onset of headache for migraine May repeat dose in 2 hours.  Do not exceed 30 mg in 24 hours 18 tablet 11     triamterene-hydrochlorothiazide (MAXZIDE-25) 37.5-25 MG per tablet Take 1 tablet by mouth daily 90 tablet 3     OTC products: none    Allergies   Allergen Reactions     Atenolol Other (See Comments)     Keflex [Cephalexin] Rash      Latex Allergy: NO    Social History   Substance Use Topics     Smoking status: Former Smoker     Quit date: 11/15/2015     Smokeless tobacco: Never Used     Alcohol use Yes      Comment: 2x per month     History   Drug Use No       REVIEW OF SYSTEMS:                                                    Constitutional, neuro, ENT, endocrine, pulmonary, cardiac,  "gastrointestinal, genitourinary, musculoskeletal, integument and psychiatric systems are negative, except as otherwise noted.      EXAM:                                                    /70 (BP Location: Left arm, Cuff Size: Adult Regular)  Pulse 113  Temp 98.4  F (36.9  C) (Oral)  Resp 16  Ht 5' 9\" (1.753 m)  Wt 160 lb (72.6 kg)  SpO2 97%  BMI 23.63 kg/m2    GENERAL APPEARANCE: healthy, alert and no distress     EYES: EOMI, PERRL     HENT: ear canals and TM's normal and nose and mouth without ulcers or lesions     NECK: no adenopathy, no asymmetry, masses, or scars and thyroid normal to palpation     RESP: lungs clear to auscultation - no rales, rhonchi or wheezes     CV: regular rates and rhythm, normal S1 S2, no S3 or S4 and no murmur, click or rub     MS: limited ROM at LS spine due to pain  Left foot has pain along dorsum and slightly swelling     SKIN: no suspicious lesions or rashes     NEURO: Normal strength and tone, sensory exam grossly normal, mentation intact and speech normal     PSYCH: mentation appears normal. and affect normal/bright     LYMPHATICS: No axillary, cervical, or supraclavicular nodes    DIAGNOSTICS:                                                    EKG: appears normal, NSR, normal axis, normal intervals, no acute ST/T changes c/w ischemia, no LVH by voltage criteria, unchanged from previous tracings, see recent scans    Recent Labs   Lab Test  08/30/17   1309  04/05/17   1108  01/25/17   1317  12/05/16   1155  01/22/16   HGB  12.9  14.0  12.6  15.8*   < >   --    PLT   --   249  248   --    --    --    NA   --    --    --   140   --   140   POTASSIUM   --    --    --   4.3   --   3.1   CR   --    --    --   0.90   --   1.02    < > = values in this interval not displayed.      Xray foot shows: normal findings -  IMPRESSION:                                                    Reason for surgery/procedure: lumbar disc herniation and pain  Diagnosis/reason for consult: plans " for implantation of nerve stimulator    The proposed surgical procedure is considered LOW risk.    REVISED CARDIAC RISK INDEX  The patient has the following serious cardiovascular risks for perioperative complications such as (MI, PE, VFib and 3  AV Block):  No serious cardiac risks  INTERPRETATION: 0 risks: Class I (very low risk - 0.4% complication rate)    The patient has the following additional risks for perioperative complications:  No identified additional risks      ICD-10-CM    1. Preop general physical exam Z01.818    2. L4-L5 disc bulge M51.26    3. Injury of left foot, initial encounter S99.922A XR Foot Left G/E 3 Views   4. Need for prophylactic vaccination and inoculation against influenza Z23 HC FLU VAC PRESRV FREE QUAD SPLIT VIR 3+YRS IM   5. Primary insomnia F51.01 traZODone (DESYREL) 100 MG tablet     EKG NSR  RECOMMENDATIONS:                                                    (Z01.818) Preop general physical exam  (primary encounter diagnosis)  Comment:   Plan: Comprehensive metabolic panel, Hemoglobin, EKG         12-lead complete w/read - Clinics            (M51.26) L4-L5 disc bulge  Comment:   Plan:     (S99.922A) Injury of left foot, initial encounter  Comment: crutches prescribed  Rest ice and will contact with radiology interpretation  Plan: XR Foot Left G/E 3 Views, order for DME            (Z23) Need for prophylactic vaccination and inoculation against influenza  Comment:   Plan: HC FLU VAC PRESRV FREE QUAD SPLIT VIR 3+YRS IM            (F51.01) Primary insomnia  Comment: refills  Plan: traZODone (DESYREL) 100 MG tablet            (M54.16) Lumbar radiculopathy  Comment:   Plan:        --Patient is to take all scheduled medications on the day of surgery EXCEPT for modifications listed below.    APPROVAL GIVEN to proceed with proposed procedure, without further diagnostic evaluation       Signed Electronically by: Jessica Flores MD    Copy of this evaluation report is provided to  requesting physician.    [facility] Preop Guidelines

## 2017-11-16 NOTE — PATIENT INSTRUCTIONS
Plan:  - Today's Plan of Care:  Referral to podiatry  Short boot  Non weight bearing on crutches      Follow Up: With podiatry

## 2017-11-16 NOTE — MR AVS SNAPSHOT
After Visit Summary   11/16/2017    Sima Ross    MRN: [id number]           Patient Information     Date Of Birth          1965        Visit Information        Provider Department      11/16/2017 11:00 AM Jessica Yanez MD [facility]        Today's Diagnoses     Nondisp fx of third metatarsal bone, left foot, init    -  1    Nondisp fx of fourth metatarsal bone, left foot, init          Care Instructions    Plan:  - Today's Plan of Care:  Referral to podiatry  Short boot  Non weight bearing on crutches      Follow Up: With podiatry              Follow-ups after your visit        Additional Services     ORTHO  REFERRAL       [facility] is referring you to the Orthopedic  Services at [facility].       The  Representative will assist you in the coordination of your Orthopedic and Musculoskeletal Care as prescribed by your physician.    The  Representative will call you within 1 business day to help schedule your appointment, or you may contact the  Representative at:    All areas ~ [phone]     Type of Referral : [facility] / [facility]       Timeframe requested: 3 - 5 days    Coverage of these services is subject to the terms and limitations of your health insurance plan.  Please call member services at your health plan with any benefit or coverage questions.      If X-rays, CT or MRI's have been performed, please contact the facility where they were done to arrange for , prior to your scheduled appointment.  Please bring this referral request to your appointment and present it to your specialist.                  Your next 10 appointments already scheduled     Nov 21, 2017   Procedure with Geno Domínguez DO   [facility] (--)    [address]  [facility] [address] [phone]              Who to contact      "If you have questions or need follow up information about today's clinic visit or your schedule please contact [facility] directly at [phone].  Normal or non-critical lab and imaging results will be communicated to you by MyChart, letter or phone within 4 business days after the clinic has received the results. If you do not hear from us within 7 days, please contact the clinic through [employer] or phone. If you have a critical or abnormal lab result, we will notify you by phone as soon as possible.  Submit refill requests through [employer] or call your pharmacy and they will forward the refill request to us. Please allow 3 business days for your refill to be completed.          Additional Information About Your Visit        [employer] Information     [employer] lets you send messages to your doctor, view your test results, renew your prescriptions, schedule appointments and more. To sign up, go to [email or web address] . Click on \"Log in\" on the left side of the screen, which will take you to the Welcome page. Then click on \"Sign up Now\" on the right side of the page.     You will be asked to enter the access code listed below, as well as some personal information. Please follow the directions to create your username and password.     Your access code is: [email or web address]  Expires: 2017 12:01 PM     Your access code will  in 90 days. If you need help or a new code, please call your [facility] clinic or [phone].        Care EveryWhere ID     This is your Care EveryWhere ID. This could be used by other organizations to access your [facility] medical records  [id number]        Your Vitals Were     Height BMI (Body Mass Index)                5' 9\" (1.753 m) 23.63 kg/m2           Blood Pressure from Last 3 Encounters:   17 97/61   17 110/70   17 124/80    Weight from Last 3 Encounters:   17 160 lb (72.6 kg)   17 160 lb (72.6 kg)   17 163 lb 12.8 " oz (74.3 kg)              We Performed the Following     ORTHO  REFERRAL        Primary Care Provider Office Phone # Fax #    Jessica Flores [phone] [address] [address]        Equal Access to Services     [facility] : Hadii aad ku hadasho Soomaali, waaxda luqadaha, qaybta kaalmada adeegyada, waxay hermelindoin hayaan adeleonardo claricemarty lakaro . So [facility] [phone].    ATENCIÓN: Si habla español, tiene a rogers disposición servicios gratuitos de asistencia lingüística. Shell al [phone].    We comply with applicable federal civil rights laws and [place] laws. We do not discriminate on the basis of race, color, national origin, age, disability, sex, sexual orientation, or gender identity.            Thank you!     Thank you for choosing [facility]  for your care. Our goal is always to provide you with excellent care. Hearing back from our patients is one way we can continue to improve our services. Please take a few minutes to complete the written survey that you may receive in the mail after your visit with us. Thank you!             Your Updated Medication List - Protect others around you: Learn how to safely use, store and throw away your medicines at www.disposemymeds.org.          This list is accurate as of: 11/16/17 11:03 AM.  Always use your most recent med list.                   Brand Name Dispense Instructions for use Diagnosis    albuterol 108 (90 BASE) MCG/ACT Inhaler    PROAIR HFA/PROVENTIL HFA/VENTOLIN HFA    1 Inhaler    Inhale 2 puffs into the lungs every 6 hours as needed for shortness of breath / dyspnea or wheezing    Cough       dicyclomine 20 MG tablet    BENTYL    360 tablet    Take 1 tablet (20 mg) by mouth every 6 hours    L4-L5 disc bulge, Lumbar radiculopathy, Essential hypertension with goal blood pressure less than 140/90, Primary insomnia, Ulcerative colitis without complications, unspecified  location (H)       estradiol 1 MG tablet    ESTRACE    90 tablet    Take 1 tablet (1 mg) by mouth daily    Hormone replacement therapy (HRT)       lisinopril 10 MG tablet    PRINIVIL/ZESTRIL    90 tablet    HOLD    Essential hypertension with goal blood pressure less than 140/90       * order for DME     1 Device    crutches    Injury of left foot, initial encounter       * order for DME     1 Device    Flat soled shoe    Closed nondisplaced fracture of middle phalanx of lesser toe of left foot, initial encounter       oxyCODONE-acetaminophen 5-325 MG per tablet    PERCOCET          rizatriptan 10 MG tablet    MAXALT    18 tablet    Take 1 tablet (10 mg) by mouth at onset of headache for migraine May repeat dose in 2 hours.  Do not exceed 30 mg in 24 hours    Migraine without aura and without status migrainosus, not intractable       tiZANidine 4 MG tablet    ZANAFLEX    160 tablet    Take 2 tablets (8 mg) by mouth 3 times daily    L4-L5 disc bulge       * traZODone 100 MG tablet    DESYREL    90 tablet    Take 1 tablet (100 mg) by mouth nightly as needed for sleep    Anxiety state       * traZODone 100 MG tablet    DESYREL    90 tablet    Take 1 tablet (100 mg) by mouth nightly as needed for sleep    Primary insomnia       valACYclovir 500 MG tablet    VALTREX    30 tablet    TAKE 1 TABLET DAILY    Herpes simplex infection of genitourinary system       * Notice:  This list has 4 medication(s) that are the same as other medications prescribed for you. Read the directions carefully, and ask your doctor or other care provider to review them with you.

## 2017-11-16 NOTE — NURSING NOTE
"No chief complaint on file.      Initial BP 97/61  Ht 5' 9\" (1.753 m)  Wt 160 lb (72.6 kg)  BMI 23.63 kg/m2 Estimated body mass index is 23.63 kg/(m^2) as calculated from the following:    Height as of this encounter: 5' 9\" (1.753 m).    Weight as of this encounter: 160 lb (72.6 kg).  Medication Reconciliation: complete     Ashu Winters, ATC    "

## 2017-11-16 NOTE — PROGRESS NOTES
Sports Medicine Clinic Visit    PCP: Jessica Flores Cody is a 52 year old female who is seen  in consultation at the request of  Jessica Flores M.D. presenting with left foot injury.    Injury: Patient's dog knocked her over and she rolled over her left foot around 1 week ago.    Location of Pain: left foot 3rd - 5th digits  Duration of Pain: ~1 week(s)  Rating of Pain at worst: 8/10  Rating of Pain Currently: 3/10  Symptoms are better with: Other medications and walking shoe  Symptoms are worse with: Full weight bearing  Additional Features:   Positive: Pain   Negative: popping, grinding, catching, locking, instability, paresthesias, numbness and weakness  Other evaluation and/or treatments so far consists of: Other medications and walking shoe  Prior History of related problems: None    Social History: Off work due to other medial problems    Review of Systems  Skin: yes bruising, yes swelling  Musculoskeletal: as above  Neurologic: no numbness, paresthesias  Remainder of review of systems is negative including constitutional, CV, pulmonary, GI, except as noted in HPI or medical history.    Patient's current problem list, past medical and surgical history, and family history were reviewed.    Patient Active Problem List   Diagnosis     Lumbar radiculopathy     Ulcerative colitis without complications (H)     L4-L5 disc bulge     Anxiety state     Major depressive disorder, recurrent episode, moderate (H)     Hypertension goal BP (blood pressure) < 140/90     Migraine without aura and without status migrainosus, not intractable     Midline low back pain without sciatica     Orthopedic aftercare     Primary insomnia     No past medical history on file.  Past Surgical History:   Procedure Laterality Date     DISCECTOMY CERVICAL MINIMALLY INVASIVE ONE LEVEL  7/2009     HYSTERECTOMY  7/2014     LAPAROSCOPIC HYSTERECTOMY TOTAL  2013     Family History   Problem Relation Age of Onset      "Hypertension Mother      Thyroid Disease Mother      Hypertension Maternal Grandmother      Hypertension Paternal Grandfather      Breast Cancer Paternal Grandmother      Breast Cancer Maternal Aunt          Objective  BP 97/61  Ht 5' 9\" (1.753 m)  Wt 160 lb (72.6 kg)  BMI 23.63 kg/m2    GENERAL APPEARANCE: healthy, alert and no distress   GAIT: antalgic  SKIN: no suspicious lesions or rashes  HEENT: Sclera clear, anicteric  CV: good peripheral pulses  RESP: Breathing not labored  NEURO: Normal strength and tone, mentation intact and speech normal  PSYCH:  mentation appears normal and affect normal/bright    Bilateral Foot and Ankle Exam:  Inspection:       edema noted mild throughout left foot    Foot inspection:       no deformity noted    Tender:       3rd and 4th MTP joints, less over 5th MTP joint    Non-Tender:       remainder of ankle and foot left    ROM:        Full active and passive ROM, ankle dorsiflexion, plantarflexion, inversion, eversion, great toe dorsiflexion, remainder of toes, midfoot and subtalar left    Strength: - some pain with strength testing       ankle dorsiflexion 5/5 bilateral       plantarflexion 5/5 bilateral       inversion 5/5 bilateral       eversion 5/5 bilateral    Gait:       antalgic gait    Neurovascular:       2+ peripheral pulses bilaterally and brisk capillary refill       sensation grossly intact      Radiology  I ordered, visualized and reviewed these images with the patient  3 views of left foot show minimally displaced fracture of 3rd and 4th metatarsal heads, some degenerative change at 5th metatarsal head, possible fracture as well  - will follow official read    Assessment:  1. Nondisp fx of third metatarsal bone, left foot, init    2. Nondisp fx of fourth metatarsal bone, left foot, init      Multiple non-displaced metatarsal fractures.  Will transition to a short boot and recommended non weight bearing on crutches as well.  Given multiple fractures, I recommend " follow up with Podiatry for monitoring.    Plan:  - Today's Plan of Care:  Referral to podiatry  Short boot  Non weight bearing on crutches    Follow Up: With podiatry    Concerning signs and symptoms were reviewed.  The patient expressed understanding of this management plan and all questions were answered at this time.    Thanks for the opportunity to participate in the care of this patient, I will keep you updated on their progress.    CC: Jessica Yanez MD CAQ  Primary Care Sports Medicine  [facility]

## 2017-11-19 ENCOUNTER — TRANSFERRED RECORDS (OUTPATIENT)
Dept: HEALTH INFORMATION MANAGEMENT | Facility: CLINIC | Age: 52
End: 2017-11-19

## 2017-11-20 ENCOUNTER — TELEPHONE (OUTPATIENT)
Dept: FAMILY MEDICINE | Facility: CLINIC | Age: 52
End: 2017-11-20

## 2017-11-20 NOTE — TELEPHONE ENCOUNTER
SN,  FYI:  Patient states she was in the ER yesterday ([facility])  Has broken rib from bad cough she had.  Was given incentive spirometer   Was given pain medication - pain manageable at this time  Just wanted you to know what happened.  Ghazala CHU RN

## 2017-11-20 NOTE — TELEPHONE ENCOUNTER
Reason for Call:  YOLY    Detailed comments: patient was in the ER yesterday and she ended up having a  Broken Rib and she said it's from Coughing too much     Phone Number Patient can be reached at: Home number on file [phone] (home)    Best Time: anytime    Can we leave a detailed message on this number? NO    Call taken on 11/20/2017 at 8:15 AM by Everton Monet

## 2017-11-21 ENCOUNTER — ANESTHESIA EVENT (OUTPATIENT)
Dept: SURGERY | Facility: CLINIC | Age: 52
End: 2017-11-21
Payer: COMMERCIAL

## 2017-11-21 ENCOUNTER — ANESTHESIA (OUTPATIENT)
Dept: SURGERY | Facility: CLINIC | Age: 52
End: 2017-11-21
Payer: COMMERCIAL

## 2017-11-21 ENCOUNTER — APPOINTMENT (OUTPATIENT)
Dept: GENERAL RADIOLOGY | Facility: CLINIC | Age: 52
End: 2017-11-21
Attending: PHYSICAL MEDICINE & REHABILITATION
Payer: COMMERCIAL

## 2017-11-21 ENCOUNTER — HOSPITAL ENCOUNTER (OUTPATIENT)
Facility: CLINIC | Age: 52
Discharge: HOME | End: 2017-11-21
Attending: PHYSICAL MEDICINE & REHABILITATION | Admitting: PHYSICAL MEDICINE & REHABILITATION
Payer: COMMERCIAL

## 2017-11-21 VITALS
HEIGHT: 69 IN | OXYGEN SATURATION: 96 % | SYSTOLIC BLOOD PRESSURE: 112 MMHG | TEMPERATURE: 98.4 F | DIASTOLIC BLOOD PRESSURE: 71 MMHG | RESPIRATION RATE: 16 BRPM | WEIGHT: 158.44 LBS | BODY MASS INDEX: 23.47 KG/M2

## 2017-11-21 PROCEDURE — 25000128 H RX IP 250 OP 636: Performed by: ANESTHESIOLOGY

## 2017-11-21 PROCEDURE — S0077 INJECTION, CLINDAMYCIN PHOSP: HCPCS | Performed by: PHYSICAL MEDICINE & REHABILITATION

## 2017-11-21 PROCEDURE — 36000093 ZZH SURGERY LEVEL 4 1ST 30 MIN: Performed by: PHYSICAL MEDICINE & REHABILITATION

## 2017-11-21 PROCEDURE — 40000170 ZZH STATISTIC PRE-PROCEDURE ASSESSMENT II: Performed by: PHYSICAL MEDICINE & REHABILITATION

## 2017-11-21 PROCEDURE — 40000277 XR SURGERY CARM FLUORO LESS THAN 5 MIN W STILLS

## 2017-11-21 PROCEDURE — 37000009 ZZH ANESTHESIA TECHNICAL FEE, EACH ADDTL 15 MIN: Performed by: PHYSICAL MEDICINE & REHABILITATION

## 2017-11-21 PROCEDURE — 25000125 ZZHC RX 250: Performed by: PHYSICAL MEDICINE & REHABILITATION

## 2017-11-21 PROCEDURE — 71000012 ZZH RECOVERY PHASE 1 LEVEL 1 FIRST HR: Performed by: PHYSICAL MEDICINE & REHABILITATION

## 2017-11-21 PROCEDURE — 25000132 ZZH RX MED GY IP 250 OP 250 PS 637: Performed by: ANESTHESIOLOGY

## 2017-11-21 PROCEDURE — 25000128 H RX IP 250 OP 636: Performed by: PHYSICAL MEDICINE & REHABILITATION

## 2017-11-21 PROCEDURE — 71000027 ZZH RECOVERY PHASE 2 EACH 15 MINS: Performed by: PHYSICAL MEDICINE & REHABILITATION

## 2017-11-21 PROCEDURE — 27210794 ZZH OR GENERAL SUPPLY STERILE: Performed by: PHYSICAL MEDICINE & REHABILITATION

## 2017-11-21 PROCEDURE — 25000128 H RX IP 250 OP 636: Performed by: NURSE ANESTHETIST, CERTIFIED REGISTERED

## 2017-11-21 PROCEDURE — 27810169 ZZH OR IMPLANT GENERAL: Performed by: PHYSICAL MEDICINE & REHABILITATION

## 2017-11-21 PROCEDURE — C1820 GENERATOR NEURO RECHG BAT SY: HCPCS | Performed by: PHYSICAL MEDICINE & REHABILITATION

## 2017-11-21 PROCEDURE — 25000125 ZZHC RX 250: Performed by: NURSE ANESTHETIST, CERTIFIED REGISTERED

## 2017-11-21 PROCEDURE — C1778 LEAD, NEUROSTIMULATOR: HCPCS | Performed by: PHYSICAL MEDICINE & REHABILITATION

## 2017-11-21 PROCEDURE — 27210995 ZZH RX 272: Performed by: PHYSICAL MEDICINE & REHABILITATION

## 2017-11-21 PROCEDURE — 71000013 ZZH RECOVERY PHASE 1 LEVEL 1 EA ADDTL HR: Performed by: PHYSICAL MEDICINE & REHABILITATION

## 2017-11-21 PROCEDURE — 36000063 ZZH SURGERY LEVEL 4 EA 15 ADDTL MIN: Performed by: PHYSICAL MEDICINE & REHABILITATION

## 2017-11-21 PROCEDURE — C1787 PATIENT PROGR, NEUROSTIM: HCPCS | Performed by: PHYSICAL MEDICINE & REHABILITATION

## 2017-11-21 PROCEDURE — 37000008 ZZH ANESTHESIA TECHNICAL FEE, 1ST 30 MIN: Performed by: PHYSICAL MEDICINE & REHABILITATION

## 2017-11-21 DEVICE — NEUROSTIMULATOR MEDT RECHARG RESTORSENSOR SURESCAN MRI 97714: Type: IMPLANTABLE DEVICE | Site: SPINE THORACIC | Status: FUNCTIONAL

## 2017-11-21 DEVICE — IMP ENVELOPE MEDT TYRX ABS ANTIBACTERIAL LG NMRM6133: Type: IMPLANTABLE DEVICE | Site: SPINE THORACIC | Status: FUNCTIONAL

## 2017-11-21 DEVICE — IMP LEAD KIT VECTRIS SCS COMPACT SURESCAN 1X8MMX60CM 977A260: Type: IMPLANTABLE DEVICE | Site: SPINE THORACIC | Status: FUNCTIONAL

## 2017-11-21 RX ORDER — HYDROMORPHONE HYDROCHLORIDE 2 MG/1
2 TABLET ORAL ONCE
Status: COMPLETED | OUTPATIENT
Start: 2017-11-21 | End: 2017-11-21

## 2017-11-21 RX ORDER — MAGNESIUM HYDROXIDE 1200 MG/15ML
LIQUID ORAL PRN
Status: DISCONTINUED | OUTPATIENT
Start: 2017-11-21 | End: 2017-11-21 | Stop reason: HOSPADM

## 2017-11-21 RX ORDER — PROPOFOL 10 MG/ML
INJECTION, EMULSION INTRAVENOUS PRN
Status: DISCONTINUED | OUTPATIENT
Start: 2017-11-21 | End: 2017-11-21

## 2017-11-21 RX ORDER — LIDOCAINE 40 MG/G
CREAM TOPICAL
Status: DISCONTINUED | OUTPATIENT
Start: 2017-11-21 | End: 2017-11-21 | Stop reason: HOSPADM

## 2017-11-21 RX ORDER — ONDANSETRON 2 MG/ML
INJECTION INTRAMUSCULAR; INTRAVENOUS PRN
Status: DISCONTINUED | OUTPATIENT
Start: 2017-11-21 | End: 2017-11-21

## 2017-11-21 RX ORDER — SODIUM CHLORIDE, SODIUM LACTATE, POTASSIUM CHLORIDE, CALCIUM CHLORIDE 600; 310; 30; 20 MG/100ML; MG/100ML; MG/100ML; MG/100ML
INJECTION, SOLUTION INTRAVENOUS CONTINUOUS PRN
Status: DISCONTINUED | OUTPATIENT
Start: 2017-11-21 | End: 2017-11-21

## 2017-11-21 RX ORDER — HYDROMORPHONE HYDROCHLORIDE 1 MG/ML
.3-.5 INJECTION, SOLUTION INTRAMUSCULAR; INTRAVENOUS; SUBCUTANEOUS EVERY 10 MIN PRN
Status: DISCONTINUED | OUTPATIENT
Start: 2017-11-21 | End: 2017-11-21 | Stop reason: HOSPADM

## 2017-11-21 RX ORDER — PROPOFOL 10 MG/ML
INJECTION, EMULSION INTRAVENOUS CONTINUOUS PRN
Status: DISCONTINUED | OUTPATIENT
Start: 2017-11-21 | End: 2017-11-21

## 2017-11-21 RX ORDER — FENTANYL CITRATE 50 UG/ML
25-50 INJECTION, SOLUTION INTRAMUSCULAR; INTRAVENOUS
Status: DISCONTINUED | OUTPATIENT
Start: 2017-11-21 | End: 2017-11-21 | Stop reason: HOSPADM

## 2017-11-21 RX ORDER — FENTANYL CITRATE 50 UG/ML
INJECTION, SOLUTION INTRAMUSCULAR; INTRAVENOUS PRN
Status: DISCONTINUED | OUTPATIENT
Start: 2017-11-21 | End: 2017-11-21

## 2017-11-21 RX ORDER — ONDANSETRON 4 MG/1
4 TABLET, ORALLY DISINTEGRATING ORAL EVERY 30 MIN PRN
Status: DISCONTINUED | OUTPATIENT
Start: 2017-11-21 | End: 2017-11-21 | Stop reason: HOSPADM

## 2017-11-21 RX ORDER — NALOXONE HYDROCHLORIDE 0.4 MG/ML
.1-.4 INJECTION, SOLUTION INTRAMUSCULAR; INTRAVENOUS; SUBCUTANEOUS
Status: DISCONTINUED | OUTPATIENT
Start: 2017-11-21 | End: 2017-11-21 | Stop reason: HOSPADM

## 2017-11-21 RX ORDER — SODIUM CHLORIDE, SODIUM LACTATE, POTASSIUM CHLORIDE, CALCIUM CHLORIDE 600; 310; 30; 20 MG/100ML; MG/100ML; MG/100ML; MG/100ML
INJECTION, SOLUTION INTRAVENOUS CONTINUOUS
Status: DISCONTINUED | OUTPATIENT
Start: 2017-11-21 | End: 2017-11-21 | Stop reason: HOSPADM

## 2017-11-21 RX ORDER — CLINDAMYCIN PHOSPHATE 900 MG/50ML
900 INJECTION, SOLUTION INTRAVENOUS
Status: COMPLETED | OUTPATIENT
Start: 2017-11-21 | End: 2017-11-21

## 2017-11-21 RX ORDER — MEPERIDINE HYDROCHLORIDE 25 MG/ML
12.5 INJECTION INTRAMUSCULAR; INTRAVENOUS; SUBCUTANEOUS
Status: DISCONTINUED | OUTPATIENT
Start: 2017-11-21 | End: 2017-11-21 | Stop reason: HOSPADM

## 2017-11-21 RX ORDER — ONDANSETRON 2 MG/ML
4 INJECTION INTRAMUSCULAR; INTRAVENOUS EVERY 30 MIN PRN
Status: DISCONTINUED | OUTPATIENT
Start: 2017-11-21 | End: 2017-11-21 | Stop reason: HOSPADM

## 2017-11-21 RX ADMIN — CLINDAMYCIN PHOSPHATE 900 MG: 18 INJECTION, SOLUTION INTRAVENOUS at 09:40

## 2017-11-21 RX ADMIN — FENTANYL CITRATE 50 MCG: 50 INJECTION, SOLUTION INTRAMUSCULAR; INTRAVENOUS at 10:50

## 2017-11-21 RX ADMIN — SODIUM CHLORIDE, POTASSIUM CHLORIDE, SODIUM LACTATE AND CALCIUM CHLORIDE: 600; 310; 30; 20 INJECTION, SOLUTION INTRAVENOUS at 09:39

## 2017-11-21 RX ADMIN — PROPOFOL 20 MG: 10 INJECTION, EMULSION INTRAVENOUS at 10:36

## 2017-11-21 RX ADMIN — FENTANYL CITRATE 50 MCG: 50 INJECTION, SOLUTION INTRAMUSCULAR; INTRAVENOUS at 11:43

## 2017-11-21 RX ADMIN — PROPOFOL 100 MCG/KG/MIN: 10 INJECTION, EMULSION INTRAVENOUS at 09:40

## 2017-11-21 RX ADMIN — PROPOFOL 20 MG: 10 INJECTION, EMULSION INTRAVENOUS at 10:42

## 2017-11-21 RX ADMIN — PROPOFOL 30 MG: 10 INJECTION, EMULSION INTRAVENOUS at 10:34

## 2017-11-21 RX ADMIN — HYDROMORPHONE HYDROCHLORIDE 2 MG: 2 TABLET ORAL at 12:11

## 2017-11-21 RX ADMIN — HYDROMORPHONE HYDROCHLORIDE 2 MG: 2 TABLET ORAL at 12:24

## 2017-11-21 RX ADMIN — FENTANYL CITRATE 50 MCG: 50 INJECTION, SOLUTION INTRAMUSCULAR; INTRAVENOUS at 10:07

## 2017-11-21 RX ADMIN — PROPOFOL 30 MG: 10 INJECTION, EMULSION INTRAVENOUS at 10:07

## 2017-11-21 RX ADMIN — PROPOFOL 20 MG: 10 INJECTION, EMULSION INTRAVENOUS at 10:49

## 2017-11-21 RX ADMIN — PROPOFOL 50 MG: 10 INJECTION, EMULSION INTRAVENOUS at 10:45

## 2017-11-21 RX ADMIN — PROPOFOL 20 MG: 10 INJECTION, EMULSION INTRAVENOUS at 10:50

## 2017-11-21 RX ADMIN — PROPOFOL 40 MG: 10 INJECTION, EMULSION INTRAVENOUS at 10:27

## 2017-11-21 RX ADMIN — FENTANYL CITRATE 50 MCG: 50 INJECTION, SOLUTION INTRAMUSCULAR; INTRAVENOUS at 11:15

## 2017-11-21 RX ADMIN — DEXMEDETOMIDINE HYDROCHLORIDE 12 MCG: 100 INJECTION, SOLUTION INTRAVENOUS at 10:46

## 2017-11-21 RX ADMIN — PROPOFOL 20 MG: 10 INJECTION, EMULSION INTRAVENOUS at 09:46

## 2017-11-21 RX ADMIN — PROPOFOL 20 MG: 10 INJECTION, EMULSION INTRAVENOUS at 10:39

## 2017-11-21 RX ADMIN — DEXMEDETOMIDINE HYDROCHLORIDE 8 MCG: 100 INJECTION, SOLUTION INTRAVENOUS at 10:42

## 2017-11-21 RX ADMIN — PROPOFOL 20 MG: 10 INJECTION, EMULSION INTRAVENOUS at 10:29

## 2017-11-21 RX ADMIN — FENTANYL CITRATE 50 MCG: 50 INJECTION, SOLUTION INTRAMUSCULAR; INTRAVENOUS at 09:42

## 2017-11-21 RX ADMIN — FENTANYL CITRATE 50 MCG: 50 INJECTION, SOLUTION INTRAMUSCULAR; INTRAVENOUS at 10:47

## 2017-11-21 RX ADMIN — MIDAZOLAM HYDROCHLORIDE 2 MG: 1 INJECTION, SOLUTION INTRAMUSCULAR; INTRAVENOUS at 09:40

## 2017-11-21 RX ADMIN — DEXMEDETOMIDINE HYDROCHLORIDE 8 MCG: 100 INJECTION, SOLUTION INTRAVENOUS at 10:50

## 2017-11-21 RX ADMIN — ONDANSETRON 4 MG: 2 INJECTION INTRAMUSCULAR; INTRAVENOUS at 10:01

## 2017-11-21 RX ADMIN — DEXMEDETOMIDINE HYDROCHLORIDE 12 MCG: 100 INJECTION, SOLUTION INTRAVENOUS at 09:42

## 2017-11-21 ASSESSMENT — LIFESTYLE VARIABLES: TOBACCO_USE: 1

## 2017-11-21 NOTE — ANESTHESIA POSTPROCEDURE EVALUATION
Patient: Sima Ross    Procedure(s):  SPINAL CORD STIMULATOR IMPLANT  - Wound Class: I-Clean    Diagnosis:POST LAMINECTOMY SYNDROME  Diagnosis Additional Information: No value filed.    Anesthesia Type:  MAC    Note:  Anesthesia Post Evaluation    Patient location during evaluation: PACU  Patient participation: Able to fully participate in evaluation  Level of consciousness: awake and awake and alert  Pain management: adequate  Airway patency: patent  Cardiovascular status: acceptable  Respiratory status: acceptable  Hydration status: acceptable  PONV: none     Anesthetic complications: None          Last vitals:  Vitals:    11/21/17 1215 11/21/17 1230 11/21/17 1254   BP: 112/70 110/70 112/71   Resp: 16 16 16   Temp:      SpO2:  94% 96%         Electronically Signed By: Medina Barrientos  November 21, 2017  3:46 PM

## 2017-11-21 NOTE — IP AVS SNAPSHOT
MRN:[id number]                      After Visit Summary   11/21/2017    Sima Ross    MRN: [id number]           Thank you!     Thank you for choosing [facility] for your care. Our goal is always to provide you with excellent care. Hearing back from our patients is one way we can continue to improve our services. Please take a few minutes to complete the written survey that you may receive in the mail after you visit with us. Thank you!        Patient Information     Date Of Birth          1965        About your hospital stay     You were admitted on:  November 21, 2017 You last received care in the:  [facility] PreOP/Phase II    You were discharged on:  November 21, 2017       Who to Call     For medical emergencies, please call 911.  For non-urgent questions about your medical care, please call your primary care provider or clinic, [phone]  For questions related to your surgery, please call your surgery clinic        Attending Provider     Provider Geno Hurtado, DO Physical Medicine and Rehab       Primary Care Provider Office Phone # Fax #    Jessica Flores [phone]      Further instructions from your care team       Same Day Surgery Discharge Instructions for  Sedation and General Anesthesia       It's not unusual to feel dizzy, light-headed or faint for up to 24 hours after surgery or while taking pain medication.  If you have these symptoms: sit for a few minutes before standing and have someone assist you when you get up to walk or use the bathroom.      You should rest and relax for the next 24 hours. We recommend you make arrangements to have an adult stay with you for at least 24 hours after your discharge.  Avoid hazardous and strenuous activity.      DO NOT DRIVE any vehicle or operate mechanical equipment for 24 hours following the end of your surgery.  Even though you may feel normal, your reactions may be affected by  the medication you have received.      Do not drink alcoholic beverages for 24 hours following surgery.       Slowly progress to your regular diet as you feel able. It's not unusual to feel nauseated and/or vomit after receiving anesthesia.  If you develop these symptoms, drink clear liquids (apple juice, ginger ale, broth, 7-up, etc. ) until you feel better.  If your nausea and vomiting persists for 24 hours, please notify your surgeon.        All narcotic pain medications, along with inactivity and anesthesia, can cause constipation. Drinking plenty of liquids and increasing fiber intake will help.      For any questions of a medical nature, call your surgeon.      Do not make important decisions for 24 hours.      If you had general anesthesia, you may have a sore throat for a couple of days related to the breathing tube used during surgery.  You may use Cepacol lozenges to help with this discomfort.  If it worsens or if you develop a fever, contact your surgeon.       If you feel your pain is not well managed with the pain medications prescribed by your surgeon, please contact your surgeon's office to let them know so they can address your concerns.     NEUROSTIMULATOR (SPINAL CORD STIMULATOR) HANDOUT  [facility]    THE GOALS AND EXPECTATIONS OF THE NEUROSTIMULATOR  1. You will need to refrain from having any dental work or colonoscopy for 2 weeks prior and 2 weeks after this procedure.  2. Certain things such as an MRI, ultrasound, diathermy, chiropractic manipulation, radiation or electrocautery may damage your neurostimulator and you should avoid these things or get MD approval first.  Cardiac pacemakers and defibrillators may interfere with the neurostimulator.  3. Normal household equipment such as cell phones, microwaves, TV s, computers, electric blankets and heating pads will not damage the neurostimulator.  4. There are activity restrictions during the recovery period after the trial  implant as well as after the permanent implant.    ACTIVITY RESTRICTIONS  The following restrictions will be in place during your trial while the leads are in place and for 4-6 weeks following lead placement of the permanent neurostimulator:  1. No raising hands above the head.  2. No twisting, bending or stretching body at the waist.  3. No lifting items greater than 5 pounds.  4. No sitting for prolonged periods of time 2 hours maximum.  (Some things that may help you comply with these restrictions are: slip on shoes, a step stool for the kitchen or bathroom and a  reacher  to grasp objects *these can be purchased at most pharmacies.)    *Following these restrictions will help ensure your leads do not move from the epidural space where  Will placed them.  Once the permanent device is implanted, scar tissue will build up which will help ensure the leads do not migrate.    SITUATIONS IN WHICH YOU CANNOT USE THE STIMULATOR  1. Driving an automobile, motorcycle, boat, riding  or anything motorized.  2. Operating any type of machinery or equipment such as: chain saw, electric nailer or wood cutting tools.    *Why?  Any quick movement or position change can cause extra stimulation to the nerves resulting in you losing your ability to control an automobile or tool.  You will eventually learn which positions cause this extra stimulation, but it is best for you to use the magnet to simply turn off the stimulator while doing these tasks in order to avoid accidents.    RISKS  1. Any complication following surgery or anesthesia is possible, including but not limited to bleeding, infection, and death.  2. Infection in the epidural space could potentially lead to meningitis or an epidural abscess.  This would result in removal of the spinal cord stimulator.  3. Accumulation of fluid in the power source site (seroma).  4. Spinal headache which sometimes requires a blood patch.  5. Mechanical complications with the  system including dislodgement of the lead/electrode, breaks in the wiring or problems with the power source.  6. Bleeding into the epidural space could result in a hematoma and nerve damage.  This may require surgical removal of the spinal cord stimulator.    DISCHARGE INSTRUCTIONS  1. Temporary surgical pain may require pain management with the use of opioids (narcotic pain medication prescribed by your provider).  Opioids can cause constipation.  Make sure you are eating plenty of fibrous foods and drinking plenty of water.  For a while, you may need to use an over-the-counter laxative such as Senokot or Milk of Magnesia.  2. You may not shower at all while your trial catheter is in.  You may shower 24 hours after your trial leads are pulled.  You may not shower for 5 days after the permanent implant surgery. Remove your dressings before showering on the 5th day.  3. You may bathe 7 days after your trial leads are pulled.  You may bathe 3 weeks after your permanent implant surgery.  4. You should start your oral antibiotics the day after your trial and/or permanent implantation and continue taking them for 10 days.  5. Sometimes a leakage of cerebral spinal fluid around the lead site may cause a spinal headache.  To alleviate this headache try lying flat as much as possible and drinking plenty of non-carbonated caffeinated fluids.  6. Stimulator reprogramming may be required periodically for the next few months.  neurost  SYMPTOMS TO REPORT  1. Signs of infection such as chills, fever, increased pain, redness, drainage or swelling from the incision site.  2. Headache persisting beyond 48 hours.  3. Unusual changes in sensation or loss of sensation.  4. Painful sensations from the neurostimulator: PLEASE STOP THE STIMULATOR and call your doctor [phone]!  5. It is considered a medical emergency if you experience:  a. Sudden severe back pain  b. Sudden onset of leg weakness or severe spasms  c. Loss of bladder  "control and / or bowel function        Pending Results     Date and Time Order Name Status Description    2017 1038 XR Surgery DURAN L/T 5 Min Fluoro w Stills In process             Admission Information     Date & Time Provider Department Dept. Phone    2017 Geno Domínguez DO [facility] PreOP/Phase [phone]      Your Vitals Were     Blood Pressure Temperature Respirations Height Weight Pulse Oximetry    110/70 98.4  F (36.9  C) 16 1.753 m (5' 9\") 71.9 kg (158 lb 7 oz) 94%    BMI (Body Mass Index)                   23.4 kg/m2           MyChart Information     [employer] lets you send messages to your doctor, view your test results, renew your prescriptions, schedule appointments and more. To sign up, go to [email or web address] . Click on \"Log in\" on the left side of the screen, which will take you to the Welcome page. Then click on \"Sign up Now\" on the right side of the page.     You will be asked to enter the access code listed below, as well as some personal information. Please follow the directions to create your username and password.     Your access code is: [email or web address]  Expires: 2017 12:01 PM     Your access code will  in 90 days. If you need help or a new code, please call your [place] clinic or [phone].        Care EveryWhere ID     This is your Care EveryWhere ID. This could be used by other organizations to access your [facility] [place] medical records  [id number]        Equal Access to Services     AMOS CAM : Hadralph espino Sosaad, waaxda luqadaha, qaybta kaalmada darrius, heena idijai aguilar. So [facility] [phone].    ATENCIÓN: Si habla español, tiene a rogers disposición servicios gratuitos de asistencia lingüística. Llame al [phone].    We comply with applicable federal civil rights laws and [place] laws. We do not discriminate on the basis of race, color, national origin, age, disability, sex, sexual orientation, or gender " identity.               Review of your medicines      UNREVIEWED medicines. Ask your doctor about these medicines        Dose / Directions    albuterol 108 (90 BASE) MCG/ACT Inhaler   Commonly known as:  PROAIR HFA/PROVENTIL HFA/VENTOLIN HFA   Used for:  Cough        Dose:  2 puff   Inhale 2 puffs into the lungs every 6 hours as needed for shortness of breath / dyspnea or wheezing   Quantity:  1 Inhaler   Refills:  0       dicyclomine 20 MG tablet   Commonly known as:  BENTYL   Used for:  L4-L5 disc bulge, Lumbar radiculopathy, Essential hypertension with goal blood pressure less than 140/90, Primary insomnia, Ulcerative colitis without complications, unspecified location (H)        Dose:  20 mg   Take 1 tablet (20 mg) by mouth every 6 hours   Quantity:  360 tablet   Refills:  3       DILAUDID PO   Notes to Patient:  1 tablet taken at 12:11 and 1 taken at 12:24        Dose:  4 mg   Take 4 mg by mouth every 4 hours as needed for moderate to severe pain   Refills:  0       estradiol 1 MG tablet   Commonly known as:  ESTRACE   Used for:  Hormone replacement therapy (HRT)        Dose:  1 mg   Take 1 tablet (1 mg) by mouth daily   Quantity:  90 tablet   Refills:  2       lisinopril 10 MG tablet   Commonly known as:  PRINIVIL/ZESTRIL   Used for:  Essential hypertension with goal blood pressure less than 140/90        HOLD   Quantity:  90 tablet   Refills:  2       MS CONTIN PO        Dose:  30 mg   Take 30 mg by mouth 3 times daily   Refills:  0       rizatriptan 10 MG tablet   Commonly known as:  MAXALT   Used for:  Migraine without aura and without status migrainosus, not intractable        Dose:  10 mg   Take 1 tablet (10 mg) by mouth at onset of headache for migraine May repeat dose in 2 hours.  Do not exceed 30 mg in 24 hours   Quantity:  18 tablet   Refills:  11       tiZANidine 4 MG tablet   Commonly known as:  ZANAFLEX   Used for:  L4-L5 disc bulge        Dose:  8 mg   Take 2 tablets (8 mg) by mouth 3 times daily    Quantity:  160 tablet   Refills:  prn       traZODone 100 MG tablet   Commonly known as:  DESYREL   Used for:  Anxiety state        Dose:  100 mg   Take 1 tablet (100 mg) by mouth nightly as needed for sleep   Quantity:  90 tablet   Refills:  0       valACYclovir 500 MG tablet   Commonly known as:  VALTREX   Used for:  Herpes simplex infection of genitourinary system        TAKE 1 TABLET DAILY   Quantity:  30 tablet   Refills:  0         CONTINUE these medicines which have NOT CHANGED        Dose / Directions    * order for DME   Used for:  Injury of left foot, initial encounter        crutches   Quantity:  1 Device   Refills:  0       * order for DME   Used for:  Nondisp fx of third metatarsal bone, left foot, init, Nondisp fx of fourth metatarsal bone, left foot, init        Equipment being ordered: walking boot md short pneumatic   Quantity:  1 Device   Refills:  0       * Notice:  This list has 2 medication(s) that are the same as other medications prescribed for you. Read the directions carefully, and ask your doctor or other care provider to review them with you.             Protect others around you: Learn how to safely use, store and throw away your medicines at www.disposemymeds.org.             Medication List: This is a list of all your medications and when to take them. Check marks below indicate your daily home schedule. Keep this list as a reference.      Medications           Morning Afternoon Evening Bedtime As Needed    albuterol 108 (90 BASE) MCG/ACT Inhaler   Commonly known as:  PROAIR HFA/PROVENTIL HFA/VENTOLIN HFA   Inhale 2 puffs into the lungs every 6 hours as needed for shortness of breath / dyspnea or wheezing                                dicyclomine 20 MG tablet   Commonly known as:  BENTYL   Take 1 tablet (20 mg) by mouth every 6 hours                                DILAUDID PO   Take 4 mg by mouth every 4 hours as needed for moderate to severe pain   Last time this was given:  2 mg on  11/21/2017 12:24 PM   Notes to Patient:  1 tablet taken at 12:11 and 1 taken at 12:24                                estradiol 1 MG tablet   Commonly known as:  ESTRACE   Take 1 tablet (1 mg) by mouth daily                                lisinopril 10 MG tablet   Commonly known as:  PRINIVIL/ZESTRIL   HOLD                                MS CONTIN PO   Take 30 mg by mouth 3 times daily                                * order for DME   crutches                                * order for DME   Equipment being ordered: walking boot md short pneumatic                                rizatriptan 10 MG tablet   Commonly known as:  MAXALT   Take 1 tablet (10 mg) by mouth at onset of headache for migraine May repeat dose in 2 hours.  Do not exceed 30 mg in 24 hours                                tiZANidine 4 MG tablet   Commonly known as:  ZANAFLEX   Take 2 tablets (8 mg) by mouth 3 times daily                                traZODone 100 MG tablet   Commonly known as:  DESYREL   Take 1 tablet (100 mg) by mouth nightly as needed for sleep                                valACYclovir 500 MG tablet   Commonly known as:  VALTREX   TAKE 1 TABLET DAILY                                * Notice:  This list has 2 medication(s) that are the same as other medications prescribed for you. Read the directions carefully, and ask your doctor or other care provider to review them with you.

## 2017-11-21 NOTE — IP AVS SNAPSHOT
[facility] PreOP/Phase II    [address]    [place] [address]    Phone:  [phone]                                       After Visit Summary   11/21/2017    Sima Ross    MRN: [id number]           After Visit Summary Signature Page     I have received my discharge instructions, and my questions have been answered. I have discussed any challenges I see with this plan with the nurse or doctor.    ..........................................................................................................................................  Patient/Patient Representative Signature      ..........................................................................................................................................  Patient Representative Print Name and Relationship to Patient    ..................................................               ................................................  Date                                            Time    ..........................................................................................................................................  Reviewed by Signature/Title    ...................................................              ..............................................  Date                                                            Time

## 2017-11-21 NOTE — ANESTHESIA CARE TRANSFER NOTE
Patient: Sima Ross    Procedure(s):  SPINAL CORD STIMULATOR IMPLANT  - Wound Class: I-Clean    Diagnosis: POST LAMINECTOMY SYNDROME  Diagnosis Additional Information: No value filed.    Anesthesia Type:   MAC     Note:  Airway :Nasal Cannula  Patient transferred to:PACU  Comments: At end of procedure, spontaneous respirations, patient alert to voice, able to follow commands. Oxygen via nasal cannula at 3 liters per minute to PACU. Oxygen tubing connected to wall O2 in PACU, SpO2, NiBP, and EKG monitors and alarms on and functioning, Mami Hugger warmer connected to patient gown, report on patient's clinical status given to PACU RN, RN questions answered.Handoff Report: Identifed the Patient, Identified the Reponsible Provider, Reviewed the pertinent medical history, Discussed the surgical course, Reviewed Intra-OP anesthesia mangement and issues during anesthesia, Set expectations for post-procedure period and Allowed opportunity for questions and acknowledgement of understanding      Vitals: (Last set prior to Anesthesia Care Transfer)    CRNA VITALS  11/21/2017 1025 - 11/21/2017 1105      11/21/2017             Ht Rate: (!)  26    Resp Rate (set): 10                Electronically Signed By: GINO Patel CRNA  November 21, 2017  11:05 AM

## 2017-11-21 NOTE — ANESTHESIA PREPROCEDURE EVALUATION
Procedure: Procedure(s):  INSERT STIMULATOR AND LEADS INTERNAL DORSAL COLUMN  Preop diagnosis: POST LAMINECTOMY SYNDROME    Allergies   Allergen Reactions     Atenolol Other (See Comments)     Blood pressure drops     Keflex [Cephalexin] Rash     Past Medical History:   Diagnosis Date     Anxiety and depression      Arachnoiditis      Heartburn      History of blood transfusion      Hypertension      Insomnia      Migraine      Other chronic pain     LUMBAR BACK, LEG PAIN     Ulcerative colitis (H)      Past Surgical History:   Procedure Laterality Date     ANKLE SURGERY Right      BACK SURGERY  2009    LUMBAR DISCECTOMY L4-L5     BACK SURGERY  2016    LUMBAR FUSION     COLONOSCOPY       HYSTERECTOMY  7/2014     LAPAROSCOPIC HYSTERECTOMY TOTAL  2013     Social History   Substance Use Topics     Smoking status: Former Smoker     Quit date: 11/15/2015     Smokeless tobacco: Never Used     Alcohol use Yes      Comment: 2x per month     Prior to Admission medications    Medication Sig Start Date End Date Taking? Authorizing Provider   Morphine Sulfate (MS CONTIN PO) Take 30 mg by mouth 3 times daily   Yes Reported, Patient   HYDROmorphone HCl (DILAUDID PO) Take 4 mg by mouth every 4 hours as needed for moderate to severe pain   Yes Reported, Patient   tiZANidine (ZANAFLEX) 4 MG tablet Take 2 tablets (8 mg) by mouth 3 times daily 3/23/17  Yes Ilia Cervantes MD   order for DME Equipment being ordered: walking boot md short pneumatic 11/16/17   Jessica Yanez MD   albuterol (PROAIR HFA/PROVENTIL HFA/VENTOLIN HFA) 108 (90 BASE) MCG/ACT Inhaler Inhale 2 puffs into the lungs every 6 hours as needed for shortness of breath / dyspnea or wheezing 11/9/17   Jessica Flores MD   order for DME crutches  Patient not taking: Reported on 11/16/2017 11/8/17   Jessica Flores MD   traZODone (DESYREL) 100 MG tablet Take 1 tablet (100 mg) by mouth nightly as needed for sleep 10/23/17   Jessica Flores MD    valACYclovir (VALTREX) 500 MG tablet TAKE 1 TABLET DAILY 7/26/17   Filipe Mina MD   lisinopril (PRINIVIL/ZESTRIL) 10 MG tablet HOLD  Patient not taking: Reported on 11/16/2017 3/23/17   Ilia Cervantes MD   estradiol (ESTRACE) 1 MG tablet Take 1 tablet (1 mg) by mouth daily 3/23/17   Ilia Cervantes MD   dicyclomine (BENTYL) 20 MG tablet Take 1 tablet (20 mg) by mouth every 6 hours 12/5/16   Jessica Flores MD   rizatriptan (MAXALT) 10 MG tablet Take 1 tablet (10 mg) by mouth at onset of headache for migraine May repeat dose in 2 hours.  Do not exceed 30 mg in 24 hours 12/5/16   Jessica Flores MD     Current Facility-Administered Medications Ordered in Epic   Medication Dose Route Frequency Last Rate Last Dose     lidocaine 1 % 1 mL  1 mL Other Q1H PRN         lidocaine (LMX4) cream   Topical Q1H PRN         sodium chloride (PF) 0.9% PF flush 3 mL  3 mL Intracatheter Q1H PRN         sodium chloride (PF) 0.9% PF flush 3 mL  3 mL Intracatheter Q8H         medication instruction   Does not apply Continuous PRN         clindamycin (CLEOCIN) infusion 900 mg  900 mg Intravenous Pre-Op/Pre-procedure x 1 dose         No current [facility]-ordered outpatient prescriptions on file.       - MEDICATION INSTRUCTIONS -       Wt Readings from Last 1 Encounters:   11/21/17 71.9 kg (158 lb 7 oz)     Temp Readings from Last 1 Encounters:   11/21/17 37.2  C (99  F) (Oral)     BP Readings from Last 6 Encounters:   11/21/17 146/77   11/16/17 97/61   11/08/17 110/70   08/30/17 124/80   03/23/17 104/64   12/05/16 (!) 135/98     Pulse Readings from Last 4 Encounters:   11/08/17 113   08/30/17 86   03/23/17 78   12/05/16 108     Resp Readings from Last 1 Encounters:   11/08/17 16     SpO2 Readings from Last 1 Encounters:   11/21/17 95%     Recent Labs   Lab Test  11/08/17   1248  12/05/16   1155   NA  139  140   POTASSIUM  3.7  4.3   CHLORIDE  104  104   CO2  26  26   ANIONGAP  9  10   GLC  90  112*   BUN  12   19   CR  0.71  0.90   GABBY  9.0  9.8     Recent Labs   Lab Test  11/08/17   1248  04/05/17   1108   AST  10  8   ALT  19  13   ALKPHOS  105  76   BILITOTAL  0.3  0.3     Recent Labs   Lab Test  11/08/17   1248  08/30/17   1309  04/05/17   1108  01/25/17   1317   WBC   --    --   5.3  7.7   HGB  13.2  12.9  14.0  12.6   PLT   --    --   249  248     No results for input(s): ABO, RH in the last 16496 hours.  No results for input(s): INR, PTT in the last 30953 hours.   No results for input(s): TROPI in the last 75468 hours.  No results for input(s): PH, PCO2, PO2, HCO3 in the last 75639 hours.  No results for input(s): HCG in the last 31518 hours.  Recent Results (from the past 744 hour(s))   XR Foot Left G/E 3 Views    Narrative    LEFT FOOT THREE OR MORE VIEWS  11/8/2017 12:18 PM     HISTORY: Injury of left foot, initial encounter.    COMPARISON: None.      Impression    IMPRESSION: Irregular lucency noted at the distal aspect of the fourth  and fifth metatarsals corresponding to fractures. The fourth  metatarsal head may be minimally displaced in a volar direction. There  appears to be surrounding margins of sclerosis suggesting these  fractures are subacute, though clinically correlate.    BAN SMITH MD   XR Foot Left G/E 3 Views    Narrative    LEFT FOOT THREE VIEWS November 16, 2017 10:36 AM    HISTORY: Left foot pain.    COMPARISON: 11/8/2017.      Impression    IMPRESSION: Again seen is irregularity of the distal fourth and fifth  metatarsal necks. Previously seen fracture lines are less distinct  consistent with fracture healing. There is also irregularity of the  similar location of the third metatarsal neck. This is unchanged and  while it represents a fracture, I am uncertain of its age. No other  abnormality or change is demonstrated.     FABY CORDOVA MD         Anesthesia Evaluation     . Pt has had prior anesthetic.     No history of anesthetic complications          ROS/MED  HX    ENT/Pulmonary:     (+)tobacco use, Current use , . .   (-) sleep apnea   Neurologic:     (+)migraines,     Cardiovascular:     (+) hypertension----. : . . . :. .       METS/Exercise Tolerance:     Hematologic:     (+) Anemia, -      Musculoskeletal:         GI/Hepatic:     (+) Other GI/Hepatic ulerative colitis       Renal/Genitourinary:         Endo:         Psychiatric:     (+) psychiatric history anxiety and depression      Infectious Disease:         Malignancy:         Other:    (+) H/O Chronic Pain,                   Physical Exam      Airway   Mallampati: II  TM distance: >3 FB  Neck ROM: full    Dental   (+) upper dentures    Cardiovascular       Pulmonary                     Anesthesia Plan      History & Physical Review  History and physical reviewed and following examination; no interval change.    ASA Status:  2 .    NPO Status:  > 8 hours    Plan for MAC          Postoperative Care      Consents  Anesthetic plan, risks, benefits and alternatives discussed with:  Patient..                          .

## 2017-11-21 NOTE — DISCHARGE INSTRUCTIONS
Same Day Surgery Discharge Instructions for  Sedation and General Anesthesia       It's not unusual to feel dizzy, light-headed or faint for up to 24 hours after surgery or while taking pain medication.  If you have these symptoms: sit for a few minutes before standing and have someone assist you when you get up to walk or use the bathroom.      You should rest and relax for the next 24 hours. We recommend you make arrangements to have an adult stay with you for at least 24 hours after your discharge.  Avoid hazardous and strenuous activity.      DO NOT DRIVE any vehicle or operate mechanical equipment for 24 hours following the end of your surgery.  Even though you may feel normal, your reactions may be affected by the medication you have received.      Do not drink alcoholic beverages for 24 hours following surgery.       Slowly progress to your regular diet as you feel able. It's not unusual to feel nauseated and/or vomit after receiving anesthesia.  If you develop these symptoms, drink clear liquids (apple juice, ginger ale, broth, 7-up, etc. ) until you feel better.  If your nausea and vomiting persists for 24 hours, please notify your surgeon.        All narcotic pain medications, along with inactivity and anesthesia, can cause constipation. Drinking plenty of liquids and increasing fiber intake will help.      For any questions of a medical nature, call your surgeon.      Do not make important decisions for 24 hours.      If you had general anesthesia, you may have a sore throat for a couple of days related to the breathing tube used during surgery.  You may use Cepacol lozenges to help with this discomfort.  If it worsens or if you develop a fever, contact your surgeon.       If you feel your pain is not well managed with the pain medications prescribed by your surgeon, please contact your surgeon's office to let them know so they can address your concerns.     NEUROSTIMULATOR (SPINAL CORD STIMULATOR)  HANDOUT  [facility]    THE GOALS AND EXPECTATIONS OF THE NEUROSTIMULATOR  1. You will need to refrain from having any dental work or colonoscopy for 2 weeks prior and 2 weeks after this procedure.  2. Certain things such as an MRI, ultrasound, diathermy, chiropractic manipulation, radiation or electrocautery may damage your neurostimulator and you should avoid these things or get MD approval first.  Cardiac pacemakers and defibrillators may interfere with the neurostimulator.  3. Normal household equipment such as cell phones, microwaves, TV s, computers, electric blankets and heating pads will not damage the neurostimulator.  4. There are activity restrictions during the recovery period after the trial implant as well as after the permanent implant.    ACTIVITY RESTRICTIONS  The following restrictions will be in place during your trial while the leads are in place and for 4-6 weeks following lead placement of the permanent neurostimulator:  1. No raising hands above the head.  2. No twisting, bending or stretching body at the waist.  3. No lifting items greater than 5 pounds.  4. No sitting for prolonged periods of time 2 hours maximum.  (Some things that may help you comply with these restrictions are: slip on shoes, a step stool for the kitchen or bathroom and a  reacher  to grasp objects *these can be purchased at most pharmacies.)    *Following these restrictions will help ensure your leads do not move from the epidural space where  Will placed them.  Once the permanent device is implanted, scar tissue will build up which will help ensure the leads do not migrate.    SITUATIONS IN WHICH YOU CANNOT USE THE STIMULATOR  1. Driving an automobile, motorcycle, boat, riding  or anything motorized.  2. Operating any type of machinery or equipment such as: chain saw, electric nailer or wood cutting tools.    *Why?  Any quick movement or position change can cause extra stimulation to the nerves  resulting in you losing your ability to control an automobile or tool.  You will eventually learn which positions cause this extra stimulation, but it is best for you to use the magnet to simply turn off the stimulator while doing these tasks in order to avoid accidents.    RISKS  1. Any complication following surgery or anesthesia is possible, including but not limited to bleeding, infection, and death.  2. Infection in the epidural space could potentially lead to meningitis or an epidural abscess.  This would result in removal of the spinal cord stimulator.  3. Accumulation of fluid in the power source site (seroma).  4. Spinal headache which sometimes requires a blood patch.  5. Mechanical complications with the system including dislodgement of the lead/electrode, breaks in the wiring or problems with the power source.  6. Bleeding into the epidural space could result in a hematoma and nerve damage.  This may require surgical removal of the spinal cord stimulator.    DISCHARGE INSTRUCTIONS  1. Temporary surgical pain may require pain management with the use of opioids (narcotic pain medication prescribed by your provider).  Opioids can cause constipation.  Make sure you are eating plenty of fibrous foods and drinking plenty of water.  For a while, you may need to use an over-the-counter laxative such as Senokot or Milk of Magnesia.  2. You may not shower at all while your trial catheter is in.  You may shower 24 hours after your trial leads are pulled.  You may not shower for 5 days after the permanent implant surgery. Remove your dressings before showering on the 5th day.  3. You may bathe 7 days after your trial leads are pulled.  You may bathe 3 weeks after your permanent implant surgery.  4. You should start your oral antibiotics the day after your trial and/or permanent implantation and continue taking them for 10 days.  5. Sometimes a leakage of cerebral spinal fluid around the lead site may cause a spinal  headache.  To alleviate this headache try lying flat as much as possible and drinking plenty of non-carbonated caffeinated fluids.  6. Stimulator reprogramming may be required periodically for the next few months.  neurost  SYMPTOMS TO REPORT  1. Signs of infection such as chills, fever, increased pain, redness, drainage or swelling from the incision site.  2. Headache persisting beyond 48 hours.  3. Unusual changes in sensation or loss of sensation.  4. Painful sensations from the neurostimulator: PLEASE STOP THE STIMULATOR and call your doctor [phone]!  5. It is considered a medical emergency if you experience:  a. Sudden severe back pain  b. Sudden onset of leg weakness or severe spasms  c. Loss of bladder control and / or bowel function

## 2017-12-13 NOTE — PROCEDURES
Name:    Sima Ross   :    1965  Location:   [facility]  Procedure date: 2017 09:40 AM  Procedure:    Medtronic Lumbar  Spinal Cord Stimulator Implant  Indication:  Postlaminectomy syndrome, not elsewhere classified M96.1    Allergies:  Ingredient Reaction Comment   CEPHALEXIN MONOHYDRATE       Anesthesia: MAC and Local.    Implants: All implants were Medtronic products. The leads' lot numbers were [id number]. The RestoreSensor implantable pulse generator was model number 95538 and serial number [id number]. The TYRX absorbable antibacterial envelope was reference number [id number] and lot number [id number].    Description of Procedure:  After discussing potential risks and benefits, the patient did wish to proceed and signed a written consent form. The pocket site was marked in the preoperative area. A prophylactic prescription was given for the patient to use for the next 10 days. The patient was brought into the operating room and positioned prone on the operating table taking care relieve all pressure points and place extremities in a comfortable position. I then initiated a surgical timeout stating the patient's name, date of birth, allergies, and the procedure to be performed. MAC anesthesia was induced. The operative field was prepped and draped in normal sterile fashion. The skin was anesthetized with Xylocaine at the appropriate level as identified fluoroscopically. This site was marked.  A paramedian incision was then made in the mid back below this laz. A 14 gauge Tuohy needle was then introduced under fluoroscopy. Additional Xylocaine was injected to provide additional anesthesia. The  T12/L1 epidural space was entered as a loss of resistance was felt with an air filled syringe. The stylet was then removed and an Eight contact compact spinal cord stimulator lead was advanced until the top 2 contacts were into the T8 vertebral level. This lead was Left of  midline.  A second Eight contact compact spinal cord stimulator lead was placed through the T12/L1 epidural space and advanced until the top 2 contacts were into the T8 vertebral level. This lead was right of midline. Lateral and AP fluoroscopy views were obtained and confirmed good placement.     I then proceeded to place the anchors . The anchors were tightened around the leads using 2.0 silk ties and then to the fascia using 2.0 silk sutures. The patient was placed under deeper anesthesia while the pocket was formed. The leads were left in place while a superficial gluteal pocket was developed  on the right side.  A subcutaneous tunnel was made and the cables were pulled through to the gluteal pocket. The cable ends were connected to the IPG and all connections were secured to torque. An impedance check was performed and all impedances were within normal limits. The IPG and excess cable were then placed into the gluteal pocket with the excess cable coiled behind the IPG. The wounds were then irrigated profusely with saline solution. 1g of Vancomycin powder was dispersed between the two incision sites. The incisions were closed in layers using absorbable sutures. Steri-strips were used as well as sutures. Final X-ray revealed no significant changes in lead positions.      The patient was awakened, positioned supine and delivered to the recovery  room. When the patient met all requirements for discharge post-op care instructions were reviewed, she verbalized understanding and was provided with a written copy. IV was discontinued with catheter intact and patient was then discharged.       Geno Domínguez DO

## 2017-12-18 DIAGNOSIS — F41.1 ANXIETY STATE: ICD-10-CM

## 2017-12-20 RX ORDER — TRAZODONE HYDROCHLORIDE 100 MG/1
TABLET ORAL
Qty: 90 TABLET | Refills: 2 | Status: SHIPPED | OUTPATIENT
Start: 2017-12-20 | End: 2018-06-28

## 2017-12-20 NOTE — TELEPHONE ENCOUNTER
Prescription approved per [facility] Refill Protocol.  Ghazala CHU RN    Requested Prescriptions   Pending Prescriptions Disp Refills     traZODone (DESYREL) 100 MG tablet [Pharmacy Med Name: TRAZODONE TAB 100MG] 90 tablet 0     Sig: TAKE 1 TABLET NIGHTLY AS   NEEDED FOR SLEEP    Serotonin Modulators Passed    12/18/2017  2:31 PM       Passed - Recent or future visit with authorizing provider's specialty    Patient had office visit in the last year or has a visit in the next 30 days with authorizing provider.  See chart review.              Passed - Patient is age 18 or older       Passed - No active pregnancy on record       Passed - No positive pregnancy test in past 12 months

## 2017-12-22 ENCOUNTER — TELEPHONE (OUTPATIENT)
Dept: FAMILY MEDICINE | Facility: CLINIC | Age: 52
End: 2017-12-22

## 2017-12-24 ENCOUNTER — HEALTH MAINTENANCE LETTER (OUTPATIENT)
Age: 52
End: 2017-12-24

## 2018-02-23 ENCOUNTER — TELEPHONE (OUTPATIENT)
Dept: FAMILY MEDICINE | Facility: CLINIC | Age: 53
End: 2018-02-23

## 2018-02-23 DIAGNOSIS — M51.369 L4-L5 DISC BULGE: ICD-10-CM

## 2018-02-23 NOTE — TELEPHONE ENCOUNTER
Reason for Call:  Medication or medication refill:    Do you use a [facility]?  Name of the pharmacy and phone number for the current request:      [facility]/PHARMACY #[id number] - [place], [place] - [address]      Name of the medication requested: tiZANidine (ZANAFLEX) 4 MG tablet    Other request: Pleas refill     Can we leave a detailed message on this number? YES    Phone number patient can be reached at: Home number on file [phone] (home)    Best Time: anytime    Call taken on 2/23/2018 at 3:40 PM by Lesley Aponte

## 2018-02-23 NOTE — TELEPHONE ENCOUNTER
Routing refill request to provider for review/approval because:  Drug not on the [facility] refill protocol   Ghazala CHU RN    Requested Prescriptions   Pending Prescriptions Disp Refills     tiZANidine (ZANAFLEX) 4 MG tablet 180 tablet 5     Sig: Take 2 tablets (8 mg) by mouth 3 times daily    There is no refill protocol information for this order

## 2018-02-25 DIAGNOSIS — Z79.890 HORMONE REPLACEMENT THERAPY (HRT): ICD-10-CM

## 2018-02-26 RX ORDER — ESTRADIOL 1 MG/1
TABLET ORAL
Qty: 30 TABLET | Refills: 0 | Status: SHIPPED | OUTPATIENT
Start: 2018-02-26 | End: 2019-02-28

## 2018-02-26 NOTE — TELEPHONE ENCOUNTER
"Requested Prescriptions   Pending Prescriptions Disp Refills     estradiol (ESTRACE) 1 MG tablet [Pharmacy Med Name: ESTRADIOL TAB 1MG] 90 tablet 2     Sig: TAKE 1 TABLET DAILY    Hormone Replacement Therapy Failed    2/25/2018 11:05 AM       Failed - Patient has mammogram in past 2 years on file if age 50-75       Failed - No positive pregnancy test on record in past 12 months       Passed - Blood pressure under 140/90 in past 12 months    BP Readings from Last 3 Encounters:   11/21/17 112/71   11/16/17 97/61   11/08/17 110/70                Passed - Recent or future visit with authorizing provider's specialty    Patient had office visit in the last year or has a visit in the next 30 days with authorizing provider.  See \"Patient Info\" tab in inbasket, or \"Choose Columns\" in Meds & Orders section of the refill encounter.            Passed - Patient is 18 years of age or older       Passed - No active pregnancy on record        "

## 2018-02-26 NOTE — TELEPHONE ENCOUNTER
Medication is being filled for 1 time refill only due to:  Patient needs to be seen because it has been more than one year since last visit.   Due for physical and mammogram.  Last mammogram 2014.  Has seen SN twice for preop's only.  Gayle Slater RN

## 2018-02-26 NOTE — TELEPHONE ENCOUNTER
Received question -     Isela - are there drug drug interactions with muscle relaxants and narcotics?  I imagine sedation but respiratory concerns?     Thanks   SN (Routing comment)          Yes - muscle relaxants when added to opioids increase the risk of CNS depression and respiratory depression (also increases the risk of paralytic ileus). This combo should be avoided whenever possible. Long-term use of muscle relaxants (outside of acute injury phase) is not supported by efficacy data.     Hope that helps!     Isela Ingram, PharmD, ROSANNA, BCACP  MTM Pharmacist, [facility]

## 2018-02-26 NOTE — TELEPHONE ENCOUNTER
Lets have her see me to talk about her pain and medications.  Since her procedure I have not seen her and would like to review what she is taking - can she see me?    Sn

## 2018-03-22 ENCOUNTER — TRANSFERRED RECORDS (OUTPATIENT)
Dept: HEALTH INFORMATION MANAGEMENT | Facility: CLINIC | Age: 53
End: 2018-03-22

## 2018-03-27 DIAGNOSIS — Z79.890 HORMONE REPLACEMENT THERAPY (HRT): ICD-10-CM

## 2018-03-27 NOTE — TELEPHONE ENCOUNTER
"Requested Prescriptions   Pending Prescriptions Disp Refills     estradiol (ESTRACE) 1 MG tablet [Pharmacy Med Name: ESTRADIOL TAB 1MG] 30 tablet 0     Sig: TAKE 1 TABLET DAILY. DUE   FOR PHYSICAL    Hormone Replacement Therapy Failed    3/27/2018  7:31 AM       Failed - Patient has mammogram in past 2 years on file if age 50-75       Passed - Blood pressure under 140/90 in past 12 months    BP Readings from Last 3 Encounters:   11/21/17 112/71   11/16/17 97/61   11/08/17 110/70                Passed - Recent (12 mo) or future (30 days) visit within the authorizing provider's specialty    Patient had office visit in the last 12 months or has a visit in the next 30 days with authorizing provider or within the authorizing provider's specialty.  See \"Patient Info\" tab in inbasket, or \"Choose Columns\" in Meds & Orders section of the refill encounter.           Passed - Patient is 18 years of age or older       Passed - No active pregnancy on record       Passed - No positive pregnancy test on record in past 12 months        "

## 2018-03-28 RX ORDER — ESTRADIOL 1 MG/1
TABLET ORAL
Qty: 30 TABLET | Refills: 0 | Status: SHIPPED | OUTPATIENT
Start: 2018-03-28 | End: 2018-05-22

## 2018-03-28 NOTE — TELEPHONE ENCOUNTER
Medication is being filled for 1 time refill only due to:  Patient needs to be seen because pt needs physical and labs

## 2018-04-20 ENCOUNTER — TRANSFERRED RECORDS (OUTPATIENT)
Dept: HEALTH INFORMATION MANAGEMENT | Facility: CLINIC | Age: 53
End: 2018-04-20

## 2018-05-07 ENCOUNTER — TRANSFERRED RECORDS (OUTPATIENT)
Dept: HEALTH INFORMATION MANAGEMENT | Facility: CLINIC | Age: 53
End: 2018-05-07

## 2018-05-18 ENCOUNTER — TELEPHONE (OUTPATIENT)
Dept: FAMILY MEDICINE | Facility: CLINIC | Age: 53
End: 2018-05-18

## 2018-05-18 NOTE — LETTER
[facility]  [address] [place]  [facility] [id number] [phone]        May 18, 2018  Sima Ross  [address]  [facility] [address]    Dear Sima,    I care about your health and have reviewed your health plan. I have reviewed your medical conditions, medication list, and lab results and am making recommendations based on this review, to better manage your health.    You are in particular need of attention regarding:  -Cholesterol  -Breast Cancer Screening  -Colon Cancer Screening  -Wellness (Physical) Visit     I am recommending that you:  -schedule a WELLNESS (Physical) APPOINTMENT with me.   I will check fasting labs the same day - nothing to eat except water and meds for 8-10 hours prior.    -schedule a MAMMOGRAM which is due. We have a mobile mammogram unit that comes to the [facility] every Tuesday from 8:00am to 4:45pm. To schedule just call the clinic at [phone]. Or, you can call [facility] at [phone] to schedule this.  Please disregard this reminder if you have had this exam elsewhere within the last year.  It would be helpful for us to have a copy of your mammogram report in our file so that we can best coordinate your care.    -schedule a COLONOSCOPY to look for colon cancer (due every 10 years or 5 years in higher risk situations.)  If you do not wish to do a colonoscopy or cannot afford to do one, at this time, there is another option. It is called a FIT test or Fecal Immunochemical Occult Blood Test (take home stool sample kit).  It does not replace the colonoscopy for colorectal cancer screening, but it can detect hidden bleeding in the lower colon.  It does need to be repeated every year and if a positive result is obtained, you would be referred for a colonoscopy.  If you have completed either one of these tests at another facility, please have the records sent to our clinic so that we can best  coordinate your care.    Here is a list of Health Maintenance topics that are due now or due soon:  Health Maintenance Due   Topic Date Due     HIV SCREEN (SYSTEM ASSIGNED)  07/19/1982     HEPATITIS C SCREENING  07/19/1982     LIPID SCREEN Q5 YR FEMALE (SYSTEM ASSIGNED)  07/19/2009     COLONOSCOPY Q1 YR  02/01/2017     MAMMO SCREEN Q2 YR (SYSTEM ASSIGNED)  05/30/2017       Please call us at [phone] (or use [employer]) to address the above recommendations.     Thank you for trusting [facility] and we appreciate the opportunity to serve you.  We look forward to supporting your healthcare needs in the future.    Healthy Regards,    [place] Team

## 2018-05-18 NOTE — TELEPHONE ENCOUNTER
Summary:    Patient is due/failing the following:   COLONOSCOPY, MAMMOGRAM and PHYSICAL    Type of outreach:  Sent letter.  Action needed: Patient needs referral/order: colonoscopy, mammo order in patients chart      If need for provider review:    Please indicate OV, lab, MTM, or nurse appt if needed.  Indicate fasting or not fasting.                                                                                                                                          ELIS Byrd

## 2018-05-22 DIAGNOSIS — Z79.890 HORMONE REPLACEMENT THERAPY (HRT): ICD-10-CM

## 2018-05-22 RX ORDER — ESTRADIOL 1 MG/1
TABLET ORAL
Start: 2018-05-22

## 2018-05-22 NOTE — TELEPHONE ENCOUNTER
Patient given 2 harper period refills for 1 month supply (2/26/18 and 3/28/18)  Due for physical.   Has only been seen for pre-op past year.  Due for mammogram. Last done 2014.    Attempt to reach patient.  Voice mail box is full.  Will try to call later.  Gayle Slater RN

## 2018-05-22 NOTE — TELEPHONE ENCOUNTER
"Requested Prescriptions   Pending Prescriptions Disp Refills     estradiol (ESTRACE) 1 MG tablet [Pharmacy Med Name: ESTRADIOL TAB 1MG] 30 tablet 0     Sig: TAKE 1 TABLET DAILY. DUE   FOR PHYSICAL    Hormone Replacement Therapy Failed    5/22/2018  1:22 AM       Failed - Patient has mammogram in past 2 years on file if age 50-75       Passed - Blood pressure under 140/90 in past 12 months    BP Readings from Last 3 Encounters:   11/21/17 112/71   11/16/17 97/61   11/08/17 110/70                Passed - Recent (12 mo) or future (30 days) visit within the authorizing provider's specialty    Patient had office visit in the last 12 months or has a visit in the next 30 days with authorizing provider or within the authorizing provider's specialty.  See \"Patient Info\" tab in inbasket, or \"Choose Columns\" in Meds & Orders section of the refill encounter.           Passed - Patient is 18 years of age or older       Passed - No active pregnancy on record       Passed - No positive pregnancy test on record in past 12 months        "

## 2018-05-24 RX ORDER — ESTRADIOL 1 MG/1
TABLET ORAL
Qty: 30 TABLET | Refills: 0 | Status: SHIPPED | OUTPATIENT
Start: 2018-05-24 | End: 2018-06-28

## 2018-05-24 NOTE — TELEPHONE ENCOUNTER
SN,   Please see below messages/refill request  Tried to call pt again today for 3rd time  No answer and VM full  Please advise.  Ghazala CHU RN

## 2018-06-20 ENCOUNTER — TRANSFERRED RECORDS (OUTPATIENT)
Dept: HEALTH INFORMATION MANAGEMENT | Facility: CLINIC | Age: 53
End: 2018-06-20

## 2018-06-21 DIAGNOSIS — Z79.890 HORMONE REPLACEMENT THERAPY (HRT): ICD-10-CM

## 2018-06-21 NOTE — TELEPHONE ENCOUNTER
"Requested Prescriptions   Pending Prescriptions Disp Refills     estradiol (ESTRACE) 1 MG tablet [Pharmacy Med Name: ESTRADIOL TAB 1MG] 30 tablet 0     Sig: TAKE 1 TABLET DAILY. DUE   FOR PHYSICAL    Hormone Replacement Therapy Failed    6/21/2018 11:07 AM       Failed - Patient has mammogram in past 2 years on file if age 50-75       Passed - Blood pressure under 140/90 in past 12 months    BP Readings from Last 3 Encounters:   11/21/17 112/71   11/16/17 97/61   11/08/17 110/70                Passed - Recent (12 mo) or future (30 days) visit within the authorizing provider's specialty    Patient had office visit in the last 12 months or has a visit in the next 30 days with authorizing provider or within the authorizing provider's specialty.  See \"Patient Info\" tab in inbasket, or \"Choose Columns\" in Meds & Orders section of the refill encounter.           Passed - Patient is 18 years of age or older       Passed - No active pregnancy on record       Passed - No positive pregnancy test on record in past 12 months        estradiol (ESTRACE) 1 MG tablet 30 tablet 0 5/24/2018       Last Written Prescription Date:  05/24/2018  Last Fill Quantity: 30,  # refills: 0   Last office visit: 11/8/2017 with prescribing provider:  Dr. German   Future Office Visit:  Unknown     "

## 2018-06-22 RX ORDER — ESTRADIOL 1 MG/1
TABLET ORAL
Start: 2018-06-22

## 2018-06-28 ENCOUNTER — TELEPHONE (OUTPATIENT)
Dept: GASTROENTEROLOGY | Facility: CLINIC | Age: 53
End: 2018-06-28

## 2018-06-28 ENCOUNTER — MEDICAL CORRESPONDENCE (OUTPATIENT)
Dept: HEALTH INFORMATION MANAGEMENT | Facility: CLINIC | Age: 53
End: 2018-06-28

## 2018-06-28 ENCOUNTER — PATIENT OUTREACH (OUTPATIENT)
Dept: CARE COORDINATION | Facility: CLINIC | Age: 53
End: 2018-06-28

## 2018-06-28 ENCOUNTER — OFFICE VISIT (OUTPATIENT)
Dept: FAMILY MEDICINE | Facility: CLINIC | Age: 53
End: 2018-06-28
Payer: COMMERCIAL

## 2018-06-28 VITALS — HEIGHT: 69 IN | WEIGHT: 156 LBS | RESPIRATION RATE: 16 BRPM | BODY MASS INDEX: 23.11 KG/M2

## 2018-06-28 DIAGNOSIS — I10 ESSENTIAL HYPERTENSION WITH GOAL BLOOD PRESSURE LESS THAN 140/90: ICD-10-CM

## 2018-06-28 DIAGNOSIS — Z79.890 HORMONE REPLACEMENT THERAPY (HRT): ICD-10-CM

## 2018-06-28 DIAGNOSIS — M54.16 LUMBAR RADICULOPATHY: ICD-10-CM

## 2018-06-28 DIAGNOSIS — Z00.00 ROUTINE GENERAL MEDICAL EXAMINATION AT A HEALTH CARE FACILITY: Primary | ICD-10-CM

## 2018-06-28 DIAGNOSIS — K51.90 ULCERATIVE COLITIS WITHOUT COMPLICATIONS, UNSPECIFIED LOCATION (H): ICD-10-CM

## 2018-06-28 DIAGNOSIS — G43.009 MIGRAINE WITHOUT AURA AND WITHOUT STATUS MIGRAINOSUS, NOT INTRACTABLE: ICD-10-CM

## 2018-06-28 DIAGNOSIS — F51.01 PRIMARY INSOMNIA: ICD-10-CM

## 2018-06-28 DIAGNOSIS — M51.369 L4-L5 DISC BULGE: ICD-10-CM

## 2018-06-28 DIAGNOSIS — F41.9 ANXIETY: ICD-10-CM

## 2018-06-28 PROCEDURE — 99396 PREV VISIT EST AGE 40-64: CPT | Performed by: FAMILY MEDICINE

## 2018-06-28 RX ORDER — GABAPENTIN 600 MG/1
600 TABLET ORAL 3 TIMES DAILY
Qty: 90 TABLET | Refills: 11 | Status: SHIPPED | OUTPATIENT
Start: 2018-06-28 | End: 2019-06-25

## 2018-06-28 RX ORDER — ESTRADIOL 1 MG/1
TABLET ORAL
Qty: 30 TABLET | Refills: 0 | Status: SHIPPED | OUTPATIENT
Start: 2018-06-28

## 2018-06-28 RX ORDER — RIZATRIPTAN BENZOATE 10 MG/1
10 TABLET ORAL
Qty: 18 TABLET | Refills: 11 | Status: SHIPPED | OUTPATIENT
Start: 2018-06-28 | End: 2019-02-28

## 2018-06-28 RX ORDER — HYDROXYZINE PAMOATE 25 MG/1
50 CAPSULE ORAL
Qty: 60 CAPSULE | Refills: 1 | Status: SHIPPED | OUTPATIENT
Start: 2018-06-28 | End: 2018-08-22

## 2018-06-28 RX ORDER — DICYCLOMINE HCL 20 MG
20 TABLET ORAL EVERY 6 HOURS
Qty: 360 TABLET | Refills: 3 | Status: SHIPPED | OUTPATIENT
Start: 2018-06-28 | End: 2019-09-06

## 2018-06-28 RX ORDER — TELMISARTAN AND HYDROCHLORTHIAZIDE 40; 12.5 MG/1; MG/1
1 TABLET ORAL DAILY
Qty: 90 TABLET | Refills: 3 | Status: SHIPPED | OUTPATIENT
Start: 2018-06-28

## 2018-06-28 RX ORDER — TRAZODONE HYDROCHLORIDE 100 MG/1
TABLET ORAL
Qty: 90 TABLET | Refills: 2 | Status: SHIPPED | OUTPATIENT
Start: 2018-06-28 | End: 2018-10-14

## 2018-06-28 ASSESSMENT — PATIENT HEALTH QUESTIONNAIRE - PHQ9
SUM OF ALL RESPONSES TO PHQ QUESTIONS 1-9: 18
10. IF YOU CHECKED OFF ANY PROBLEMS, HOW DIFFICULT HAVE THESE PROBLEMS MADE IT FOR YOU TO DO YOUR WORK, TAKE CARE OF THINGS AT HOME, OR GET ALONG WITH OTHER PEOPLE: VERY DIFFICULT
SUM OF ALL RESPONSES TO PHQ QUESTIONS 1-9: 18

## 2018-06-28 NOTE — TELEPHONE ENCOUNTER
SN  Patient called.  Saw you today.  Asking for Vistaril refill.  States she last had it about 1.5 years ago.  States it helps keep her stomach calm when she gets nauseated/reduces her anxiety.    Order T'd up from medication history list.  Please advise.  Thanks, Gayle Slater RN

## 2018-06-28 NOTE — TELEPHONE ENCOUNTER
Reason for Call:  Medication or medication refill:    Do you use a [facility]?  Name of the pharmacy and phone number for the current request:     [id number] - [place], [place] - [address]      Name of the medication requested: med for nausea from migraines     Other request:     Can we leave a detailed message on this number? YES    Phone number patient can be reached at: Home number on file [phone] (home)    Best Time: anytime    Call taken on 6/28/2018 at 3:29 PM by Fausto Murcia

## 2018-06-28 NOTE — PATIENT INSTRUCTIONS
[place] program for mammogram:      Colonoscopy  is due this year    Look into the Shringrix vaccine - possibly from a pharmacy      Preventive Health Recommendations  Female Ages 50 - 64    Yearly exam: See your health care provider every year in order to  o Review health changes.   o Discuss preventive care.    o Review your medicines if your doctor has prescribed any.      Get a Pap test every three years (unless you have an abnormal result and your provider advises testing more often).    If you get Pap tests with HPV test, you only need to test every 5 years, unless you have an abnormal result.     You do not need a Pap test if your uterus was removed (hysterectomy) and you have not had cancer.    You should be tested each year for STDs (sexually transmitted diseases) if you're at risk.     Have a mammogram every 1 to 2 years.    Have a colonoscopy at age 50, or have a yearly FIT test (stool test). These exams screen for colon cancer.      Have a cholesterol test every 5 years, or more often if advised.    Have a diabetes test (fasting glucose) every three years. If you are at risk for diabetes, you should have this test more often.     If you are at risk for osteoporosis (brittle bone disease), think about having a bone density scan (DEXA).    Shots: Get a flu shot each year. Get a tetanus shot every 10 years.    Nutrition:     Eat at least 5 servings of fruits and vegetables each day.    Eat whole-grain bread, whole-wheat pasta and brown rice instead of white grains and rice.    Get adequate Calcium and Vitamin D.     Lifestyle    Exercise at least 150 minutes a week (30 minutes a day, 5 days a week). This will help you control your weight and prevent disease.    Limit alcohol to one drink per day.    No smoking.     Wear sunscreen to prevent skin cancer.     See your dentist every six months for an exam and cleaning.    See your eye doctor every 1 to 2 years.

## 2018-06-28 NOTE — PROGRESS NOTES
Clinic Care Coordination Contact    Clinic Care Coordination Contact  OUTREACH    Referral Information:  Referral Source: Care Team              Universal Utilization:      Utilization    Last refreshed: 6/28/2018  1:11 PM:  No Show Count (past year) 1       Last refreshed: 6/28/2018  1:11 PM:  ED visits 0       Last refreshed: 6/28/2018  1:11 PM:  Hospital admissions 0          Current as of: 6/28/2018  1:11 PM             Clinical Concerns:  Current Medical Concerns:  Patient had back surgery with complications in 2017. Patient states she developed Arachnoiditis after surgery.   Patient was unable to work due to pain. Patient was on STD, then LTD for the past 18 months. Patient was told by employer that if she could not return to work full time she would be let go. Patient resigned her job. Patient currently has no insurance.  Patient is not on SSD.   Clinic Care Coordinator RN gave patient contact information for [facility].  Patient agreed to contact then regarding insurance.   Clinic Care Coordinator RN will ask Clinic Care Coordinator  to contact patient regarding SSD.     Clinic Care Coordinator RN and patient discussed the stress of her situation.  Patient has a psychologist at [facility].       Medication Management:  Patient is seen at pain clinic.     Functional Status:  Mobility Status: Independent    Living Situation:  Current living arrangement:: Other (lives with boyfriend)       Psychosocial:      Clinic Care Coordinator RN and patient discussed the stress of her situation.  Patient has a psychologist at [facility].       Financial/Insurance:   Financial/Insurance concerns (GOAL):: Yes     Resources and Interventions:  Current Resources:            Advance Care Plan/Directive  Advanced Care Plans/Directives on file:: No          Goals:   Goals        General    Financial Wellbeing (pt-stated)     Notes - Note created  6/28/2018  2:00 PM by Farnaz Smith RN     Goal 1  Statement: I will contact [employer] for assist with insurance  Measure of Success: completed  Supportive Steps to Achieve: call Clinic Care Coordinator RN /SW if needed  Barriers: completing   Strengths: no insurance  Date to Achieve By: 30 days   '              Patient/Caregiver understanding: yes           Plan: Patient will contact [employer] [facility] regarding insurance  Clinic Care Coordinator  will contact patient regarding SSD.

## 2018-06-28 NOTE — PROGRESS NOTES
SUBJECTIVE:   CC: Sima Ross is an 52 year old woman who presents for preventive health visit.     Physical   Annual:     Getting at least 3 servings of Calcium per day:  NO    Bi-annual eye exam:  NO    Dental care twice a year:  Yes    Sleep apnea or symptoms of sleep apnea:  Excessive snoring    Diet:  Regular (no restrictions)    Frequency of exercise:  4-5 days/week    Duration of exercise:  45-60 minutes    Taking medications regularly:  Not Applicable        Refill meds    (Z00.00) Routine general medical examination at a health care facility  (primary encounter diagnosis)  Comment: Here for routine physical is running out of insurance at the end of this month and is concerned about cost of medications and visits.  We talked about having her care coordinator contact her about the [place] program for mammogram screening.  She is also due for colon screening  Plan: CARE COORDINATION REFERRAL            (K51.90) Ulcerative colitis without complications, unspecified location (H)  Comment: Ulcerative colitis has been stable.  Managed with medications.  She continues to see GI as needed.  Colonoscopy is due  Plan: dicyclomine (BENTYL) 20 MG tablet,         GASTROENTEROLOGY ADULT REF PROCEDURE ONLY         [facility] [phone]            (M51.26) L4-L5 disc bulge  Comment:  Chronic pain has been using intermittent tizanidine and gabapentin refills of this are offered.  Has done physical therapy and work to the chiropractor and still has  Plan: dicyclomine (BENTYL) 20 MG tablet, tiZANidine         (ZANAFLEX) 4 MG tablet, gabapentin (NEURONTIN)         600 MG tablet, CARE COORDINATION REFERRAL        Exercises that she does.      (I10) Essential hypertension with goal blood pressure less than 140/90  Comment: Her blood pressure reviewed she has been taking 40 mg of combination hydrochlorothiazide telmisartan and this is worked well for her.    Plan:         Telmisartan-HCTZ 40-12.5 MG TABS             (G43.009) Migraine without aura and without status migrainosus, not intractable  Comment: Migraines are stable just needs refills  Plan: rizatriptan (MAXALT) 10 MG tablet              (Z79.890) Hormone replacement therapy (HRT)  Comment:   Plan: estradiol (ESTRACE) 1 MG tablet            (F41.1) Anxiety state  Comment:   Plan: traZODone (DESYREL) 100 MG tablet               Today's PHQ-2 Score:   PHQ-2 ( 1999 Pfizer) 6/28/2018   Q1: Little interest or pleasure in doing things 3   Q2: Feeling down, depressed or hopeless 2   PHQ-2 Score 5   Q1: Little interest or pleasure in doing things Nearly every day   Q2: Feeling down, depressed or hopeless More than half the days   PHQ-2 Score 5       Abuse: Current or Past(Physical, Sexual or Emotional)- No  Do you feel safe in your environment - Yes    Social History   Substance Use Topics     Smoking status: Former Smoker     Quit date: 11/15/2015     Smokeless tobacco: Never Used     Alcohol use Yes      Comment: 2x per month     Alcohol Use 6/28/2018   If you drink alcohol do you typically have greater than 3 drinks per day OR greater than 7 drinks per week? No       Reviewed orders with patient.  Reviewed health maintenance and updated orders accordingly - Yes  Patient Active Problem List   Diagnosis     Lumbar radiculopathy     Ulcerative colitis without complications (H)     L4-L5 disc bulge     Anxiety state     Major depressive disorder, recurrent episode, moderate (H)     Hypertension goal BP (blood pressure) < 140/90     Migraine without aura and without status migrainosus, not intractable     Midline low back pain without sciatica     Orthopedic aftercare     Primary insomnia     Past Surgical History:   Procedure Laterality Date     ANKLE SURGERY Right      BACK SURGERY  2009    LUMBAR DISCECTOMY L4-L5     BACK SURGERY  2016    LUMBAR FUSION     COLONOSCOPY       HYSTERECTOMY  7/2014     INSERT STIMULATOR AND LEADS INTERNAL DORSAL COLUMN N/A 11/21/2017     Procedure: INSERT STIMULATOR AND LEADS INTERNAL DORSAL COLUMN;  SPINAL CORD STIMULATOR IMPLANT ;  Surgeon: Geno Domínguez DO;  Location: [facility] OR     LAPAROSCOPIC HYSTERECTOMY TOTAL  2013       Social History   Substance Use Topics     Smoking status: Former Smoker     Quit date: 11/15/2015     Smokeless tobacco: Never Used     Alcohol use Yes      Comment: 2x per month     Family History   Problem Relation Age of Onset     Hypertension Mother      Thyroid Disease Mother      Hypertension Maternal Grandmother      Hypertension Paternal Grandfather      Breast Cancer Paternal Grandmother      Breast Cancer Maternal Aunt          Current Outpatient Prescriptions   Medication Sig Dispense Refill     albuterol (PROAIR HFA/PROVENTIL HFA/VENTOLIN HFA) 108 (90 BASE) MCG/ACT Inhaler Inhale 2 puffs into the lungs every 6 hours as needed for shortness of breath / dyspnea or wheezing 1 Inhaler 3     dicyclomine (BENTYL) 20 MG tablet Take 1 tablet (20 mg) by mouth every 6 hours 360 tablet 3     estradiol (ESTRACE) 1 MG tablet TAKE 1 TABLET DAILY. DUE   FOR PHYSICAL 30 tablet 0     estradiol (ESTRACE) 1 MG tablet TAKE 1 TABLET DAILY 30 tablet 0     gabapentin (NEURONTIN) 600 MG tablet Take 1 tablet (600 mg) by mouth 3 times daily 90 tablet 11     HYDROmorphone HCl (DILAUDID PO) Take 4 mg by mouth every 4 hours as needed for moderate to severe pain       Morphine Sulfate (MS CONTIN PO) Take 30 mg by mouth 3 times daily       rizatriptan (MAXALT) 10 MG tablet Take 1 tablet (10 mg) by mouth at onset of headache for migraine May repeat dose in 2 hours.  Do not exceed 30 mg in 24 hours 18 tablet 11     Telmisartan-HCTZ 40-12.5 MG TABS Take 1 tablet by mouth daily 90 tablet 3     tiZANidine (ZANAFLEX) 4 MG tablet Take 2 tablets (8 mg) by mouth 3 times daily 180 tablet 0     traZODone (DESYREL) 100 MG tablet TAKE 1 TABLET NIGHTLY AS   NEEDED FOR SLEEP 90 tablet 2     valACYclovir (VALTREX) 500 MG tablet TAKE 1 TABLET DAILY 30 tablet 0      [DISCONTINUED] estradiol (ESTRACE) 1 MG tablet TAKE 1 TABLET DAILY. DUE   FOR PHYSICAL 30 tablet 0     [DISCONTINUED] traZODone (DESYREL) 100 MG tablet TAKE 1 TABLET NIGHTLY AS   NEEDED FOR SLEEP 90 tablet 2       Patient over age 50, mutual decision to screen reflected in health maintenance.    Pertinent mammograms are reviewed under the imaging tab.  History of abnormal Pap smear: NO - age 30-65 PAP every 5 years with negative HPV co-testing recommended     Reviewed and updated as needed this visit by clinical staff  Tobacco  Allergies  Meds         Reviewed and updated as needed this visit by Provider        Past Medical History:   Diagnosis Date     Anxiety and depression      Arachnoiditis      Heartburn      History of blood transfusion      Hypertension      Insomnia      Migraine      Other chronic pain     LUMBAR BACK, LEG PAIN     Ulcerative colitis (H)       Past Surgical History:   Procedure Laterality Date     ANKLE SURGERY Right      BACK SURGERY  2009    LUMBAR DISCECTOMY L4-L5     BACK SURGERY  2016    LUMBAR FUSION     COLONOSCOPY       HYSTERECTOMY  7/2014     INSERT STIMULATOR AND LEADS INTERNAL DORSAL COLUMN N/A 11/21/2017    Procedure: INSERT STIMULATOR AND LEADS INTERNAL DORSAL COLUMN;  SPINAL CORD STIMULATOR IMPLANT ;  Surgeon: Geno Domínguez DO;  Location: [facility] OR     LAPAROSCOPIC HYSTERECTOMY TOTAL  2013       Review of Systems  CONSTITUTIONAL: NEGATIVE for fever, chills, change in weight  INTEGUMENTARY/SKIN: NEGATIVE for worrisome rashes, moles or lesions  EYES: NEGATIVE for vision changes or irritation  ENT: NEGATIVE for ear, mouth and throat problems  RESP: NEGATIVE for significant cough or SOB  BREAST: NEGATIVE for masses, tenderness or discharge  CV: NEGATIVE for chest pain, palpitations or peripheral edema  GI: NEGATIVE for nausea, abdominal pain, heartburn, or change in bowel habits  : NEGATIVE for unusual urinary or vaginal symptoms. No vaginal bleeding.  MUSCULOSKELETAL:  "NEGATIVE for significant arthralgias or myalgia  NEURO: NEGATIVE for weakness, dizziness or paresthesias  PSYCHIATRIC: NEGATIVE for changes in mood or affect      OBJECTIVE:   Resp 16  Ht 5' 9\" (1.753 m)  Wt 156 lb (70.8 kg)  Breastfeeding? No  BMI 23.04 kg/m2  Physical Exam  GENERAL: healthy, alert and no distress  EYES: Eyes grossly normal to inspection, PERRL and conjunctivae and sclerae normal  HENT: ear canals and TM's normal, nose and mouth without ulcers or lesions  NECK: no adenopathy, no asymmetry, masses, or scars and thyroid normal to palpation  RESP: lungs clear to auscultation - no rales, rhonchi or wheezes  BREAST: normal without masses, tenderness or nipple discharge and no palpable axillary masses or adenopathy  CV: regular rate and rhythm, normal S1 S2, no S3 or S4, no murmur, click or rub, no peripheral edema and peripheral pulses strong  ABDOMEN: soft, nontender, no hepatosplenomegaly, no masses and bowel sounds normal  MS: no gross musculoskeletal defects noted, no edema  SKIN: no suspicious lesions or rashes  NEURO: Normal strength and tone, mentation intact and speech normal  PSYCH: mentation appears normal, affect normal/bright    Diagnostic Test Results:  none     ASSESSMENT/PLAN:   1. Routine general medical examination at a health care facility    - CARE COORDINATION REFERRAL    2. Ulcerative colitis without complications, unspecified location (H)    - dicyclomine (BENTYL) 20 MG tablet; Take 1 tablet (20 mg) by mouth every 6 hours  Dispense: 360 tablet; Refill: 3  - GASTROENTEROLOGY ADULT REF PROCEDURE ONLY [facility] [phone]    3. L4-L5 disc bulge    - dicyclomine (BENTYL) 20 MG tablet; Take 1 tablet (20 mg) by mouth every 6 hours  Dispense: 360 tablet; Refill: 3  - tiZANidine (ZANAFLEX) 4 MG tablet; Take 2 tablets (8 mg) by mouth 3 times daily  Dispense: 180 tablet; Refill: 0  - gabapentin (NEURONTIN) 600 MG tablet; Take 1 tablet (600 mg) by mouth 3 times daily  Dispense: " "90 tablet; Refill: 11  - CARE COORDINATION REFERRAL    4. Lumbar radiculopathy    - dicyclomine (BENTYL) 20 MG tablet; Take 1 tablet (20 mg) by mouth every 6 hours  Dispense: 360 tablet; Refill: 3    5. Essential hypertension with goal blood pressure less than 140/90    - dicyclomine (BENTYL) 20 MG tablet; Take 1 tablet (20 mg) by mouth every 6 hours  Dispense: 360 tablet; Refill: 3  - Telmisartan-HCTZ 40-12.5 MG TABS; Take 1 tablet by mouth daily  Dispense: 90 tablet; Refill: 3    6. Migraine without aura and without status migrainosus, not intractable    - rizatriptan (MAXALT) 10 MG tablet; Take 1 tablet (10 mg) by mouth at onset of headache for migraine May repeat dose in 2 hours.  Do not exceed 30 mg in 24 hours  Dispense: 18 tablet; Refill: 11    7. Primary insomnia    - dicyclomine (BENTYL) 20 MG tablet; Take 1 tablet (20 mg) by mouth every 6 hours  Dispense: 360 tablet; Refill: 3    8. Hormone replacement therapy (HRT)    - estradiol (ESTRACE) 1 MG tablet; TAKE 1 TABLET DAILY. DUE   FOR PHYSICAL  Dispense: 30 tablet; Refill: 0    9. Anxiety state    - traZODone (DESYREL) 100 MG tablet; TAKE 1 TABLET NIGHTLY AS   NEEDED FOR SLEEP  Dispense: 90 tablet; Refill: 2         COUNSELING:  Reviewed preventive health counseling, as reflected in patient instructions       Regular exercise       Healthy diet/nutrition    BP Readings from Last 1 Encounters:   11/21/17 112/71     Estimated body mass index is 23.04 kg/(m^2) as calculated from the following:    Height as of this encounter: 5' 9\" (1.753 m).    Weight as of this encounter: 156 lb (70.8 kg).           reports that she quit smoking about 2 years ago. She has never used smokeless tobacco.      Counseling Resources:  ATP IV Guidelines  Pooled Cohorts Equation Calculator  Breast Cancer Risk Calculator  FRAX Risk Assessment  ICSI Preventive Guidelines  Dietary Guidelines for Americans, 2010  USDA's MyPlate  ASA Prophylaxis  Lung CA Screening    Jessica Salvador " MD Mark  [facility]  Answers for HPI/ROS submitted by the patient on 6/28/2018   PHQ-2 Score: 5  If you checked off any problems, how difficult have these problems made it for you to do your work, take care of things at home, or get along with other people?: Very difficult  PHQ9 TOTAL SCORE: 18

## 2018-06-28 NOTE — MR AVS SNAPSHOT
After Visit Summary   6/28/2018    Sima Ross    MRN: [id number]           Patient Information     Date Of Birth          1965        Visit Information        Provider Department      6/28/2018 12:30 PM Jessica Flores MD [facility]        Today's Diagnoses     Routine general medical examination at a health care facility    -  1    L4-L5 disc bulge        Lumbar radiculopathy        Essential hypertension with goal blood pressure less than 140/90        Primary insomnia        Ulcerative colitis without complications, unspecified location (H)        Hormone replacement therapy (HRT)        Migraine without aura and without status migrainosus, not intractable        Anxiety state        Benign essential hypertension          Care Instructions    Jairo program for mammogram:      Colonoscopy  is due this year    Look into the Shringrix vaccine - possibly from a pharmacy      Preventive Health Recommendations  Female Ages 50 - 64    Yearly exam: See your health care provider every year in order to  o Review health changes.   o Discuss preventive care.    o Review your medicines if your doctor has prescribed any.      Get a Pap test every three years (unless you have an abnormal result and your provider advises testing more often).    If you get Pap tests with HPV test, you only need to test every 5 years, unless you have an abnormal result.     You do not need a Pap test if your uterus was removed (hysterectomy) and you have not had cancer.    You should be tested each year for STDs (sexually transmitted diseases) if you're at risk.     Have a mammogram every 1 to 2 years.    Have a colonoscopy at age 50, or have a yearly FIT test (stool test). These exams screen for colon cancer.      Have a cholesterol test every 5 years, or more often if advised.    Have a diabetes test (fasting glucose) every three years. If you are at risk for diabetes, you should have this test more  often.     If you are at risk for osteoporosis (brittle bone disease), think about having a bone density scan (DEXA).    Shots: Get a flu shot each year. Get a tetanus shot every 10 years.    Nutrition:     Eat at least 5 servings of fruits and vegetables each day.    Eat whole-grain bread, whole-wheat pasta and brown rice instead of white grains and rice.    Get adequate Calcium and Vitamin D.     Lifestyle    Exercise at least 150 minutes a week (30 minutes a day, 5 days a week). This will help you control your weight and prevent disease.    Limit alcohol to one drink per day.    No smoking.     Wear sunscreen to prevent skin cancer.     See your dentist every six months for an exam and cleaning.    See your eye doctor every 1 to 2 years.            Follow-ups after your visit        Additional Services     CARE COORDINATION REFERRAL       Services are provided by a Care Coordinator for people with complex needs such as: medical, social, or financial troubles.  The Care Coordinator works with the patient and their Primary Care Provider to determine health goals, obtain resources, achieve outcomes, and develop care plans that help coordinate the patient's care.     Reason for Referral: Financial Support: Insurance    Additional pertinent details:  Insurance lapsing in 2 days concerned about cost of meds    Clinical Staff have discussed the Care Coordination Referral with the patient and/or caregiver: yes            GASTROENTEROLOGY ADULT REF PROCEDURE ONLY [facility] [phone]       Last Lab Result: Creatinine (mg/dL)       Date                     Value                 11/08/2017               0.71             ----------  Body mass index is 23.04 kg/(m^2).      Patient will be contacted to schedule procedure.     Please be aware that coverage of these services is subject to the terms and limitations of your health insurance plan.  Call member services at your health plan with any benefit or coverage  "questions.  Any procedures must be performed at a [facility] facility OR coordinated by your clinic's referral office.    Please bring the following with you to your appointment:    (1) Any X-Rays, CTs or MRIs which have been performed.  Contact the facility where they were done to arrange for  prior to your scheduled appointment.    (2) List of current medications   (3) This referral request   (4) Any documents/labs given to you for this referral                  Who to contact     If you have questions or need follow up information about today's clinic visit or your schedule please contact [facility] directly at [phone].  Normal or non-critical lab and imaging results will be communicated to you by MyChart, letter or phone within 4 business days after the clinic has received the results. If you do not hear from us within 7 days, please contact the clinic through [facility] or phone. If you have a critical or abnormal lab result, we will notify you by phone as soon as possible.  Submit refill requests through DocDep or call your pharmacy and they will forward the refill request to us. Please allow 3 business days for your refill to be completed.          Additional Information About Your Visit        [facility] Information     DocDep lets you send messages to your doctor, view your test results, renew your prescriptions, schedule appointments and more. To sign up, go to [email or web address] . Click on \"Log in\" on the left side of the screen, which will take you to the Welcome page. Then click on \"Sign up Now\" on the right side of the page.     You will be asked to enter the access code listed below, as well as some personal information. Please follow the directions to create your username and password.     Your access code is: [email or web address]  Expires: 2018  1:08 PM     Your access code will  in 90 days. If you need help or a new code, please call your [facility] clinic or [phone].   " "     Care EveryWhere ID     This is your Care EveryWhere ID. This could be used by other organizations to access your [facility] medical records  [id number]        Your Vitals Were     Respirations Height Breastfeeding? BMI (Body Mass Index)          16 5' 9\" (1.753 m) No 23.04 kg/m2         Blood Pressure from Last 3 Encounters:   11/21/17 112/71   11/16/17 97/61   11/08/17 110/70    Weight from Last 3 Encounters:   06/28/18 156 lb (70.8 kg)   11/21/17 158 lb 7 oz (71.9 kg)   11/16/17 160 lb (72.6 kg)              We Performed the Following     CARE COORDINATION REFERRAL     GASTROENTEROLOGY ADULT REF PROCEDURE ONLY [facility] [phone]          Today's Medication Changes          These changes are accurate as of 6/28/18  1:08 PM.  If you have any questions, ask your nurse or doctor.               Start taking these medicines.        Dose/Directions    gabapentin 600 MG tablet   Commonly known as:  NEURONTIN   Used for:  L4-L5 disc bulge   Started by:  Jessica Flores MD        Dose:  600 mg   Take 1 tablet (600 mg) by mouth 3 times daily   Quantity:  90 tablet   Refills:  11       Telmisartan-HCTZ 40-12.5 MG Tabs   Used for:  Benign essential hypertension   Started by:  Jessica Flores MD        Dose:  1 tablet   Take 1 tablet by mouth daily   Quantity:  90 tablet   Refills:  3         Stop taking these medicines if you haven't already. Please contact your care team if you have questions.     lisinopril 10 MG tablet   Commonly known as:  PRINIVIL/ZESTRIL   Stopped by:  Jessica Flores MD           order for DME   Stopped by:  Jessica Flores MD                Where to get your medicines      These medications were sent to [facility]/pharmacy #[id number] - [place], [place] - [address] [facility] [address]     Phone:  [phone]     dicyclomine 20 MG tablet    estradiol 1 MG tablet    gabapentin 600 MG tablet    rizatriptan 10 MG tablet    " Telmisartan-HCTZ 40-12.5 MG Tabs    tiZANidine 4 MG tablet    traZODone 100 MG tablet                Primary Care Provider Office Phone # Fax #    [place] Flores [phone]       [address] [address]        Equal Access to Services     [facility] : Hadii aad ku hadasho Soomaali, waaxda luqadaha, qaybta kaalmada adeegyada, waxay idiin hayaan adeeg khrashidash laRadhaaan . So [facility] [phone].    ATENCIÓN: Si habla español, tiene a rogers disposición servicios gratuitos de asistencia lingüística. Llame al [phone].    We comply with applicable federal civil rights laws and [place] laws. We do not discriminate on the basis of race, color, national origin, age, disability, sex, sexual orientation, or gender identity.            Thank you!     Thank you for choosing [facility]  for your care. Our goal is always to provide you with excellent care. Hearing back from our patients is one way we can continue to improve our services. Please take a few minutes to complete the written survey that you may receive in the mail after your visit with us. Thank you!             Your Updated Medication List - Protect others around you: Learn how to safely use, store and throw away your medicines at www.disposemymeds.org.          This list is accurate as of 6/28/18  1:08 PM.  Always use your most recent med list.                   Brand Name Dispense Instructions for use Diagnosis    albuterol 108 (90 Base) MCG/ACT Inhaler    PROAIR HFA/PROVENTIL HFA/VENTOLIN HFA    1 Inhaler    Inhale 2 puffs into the lungs every 6 hours as needed for shortness of breath / dyspnea or wheezing    Cough       dicyclomine 20 MG tablet    BENTYL    360 tablet    Take 1 tablet (20 mg) by mouth every 6 hours    L4-L5 disc bulge, Lumbar radiculopathy, Essential hypertension with goal blood pressure less than 140/90, Primary insomnia, Ulcerative colitis without complications, unspecified location  (H)       DILAUDID PO      Take 4 mg by mouth every 4 hours as needed for moderate to severe pain        * estradiol 1 MG tablet    ESTRACE    30 tablet    TAKE 1 TABLET DAILY    Hormone replacement therapy (HRT)       * estradiol 1 MG tablet    ESTRACE    30 tablet    TAKE 1 TABLET DAILY. DUE   FOR PHYSICAL    Hormone replacement therapy (HRT)       gabapentin 600 MG tablet    NEURONTIN    90 tablet    Take 1 tablet (600 mg) by mouth 3 times daily    L4-L5 disc bulge       MS CONTIN PO      Take 30 mg by mouth 3 times daily        rizatriptan 10 MG tablet    MAXALT    18 tablet    Take 1 tablet (10 mg) by mouth at onset of headache for migraine May repeat dose in 2 hours.  Do not exceed 30 mg in 24 hours    Migraine without aura and without status migrainosus, not intractable       Telmisartan-HCTZ 40-12.5 MG Tabs     90 tablet    Take 1 tablet by mouth daily    Benign essential hypertension       tiZANidine 4 MG tablet    ZANAFLEX    180 tablet    Take 2 tablets (8 mg) by mouth 3 times daily    L4-L5 disc bulge       traZODone 100 MG tablet    DESYREL    90 tablet    TAKE 1 TABLET NIGHTLY AS   NEEDED FOR SLEEP    Anxiety state       valACYclovir 500 MG tablet    VALTREX    30 tablet    TAKE 1 TABLET DAILY    Herpes simplex infection of genitourinary system       * Notice:  This list has 2 medication(s) that are the same as other medications prescribed for you. Read the directions carefully, and ask your doctor or other care provider to review them with you.

## 2018-06-29 ASSESSMENT — PATIENT HEALTH QUESTIONNAIRE - PHQ9: SUM OF ALL RESPONSES TO PHQ QUESTIONS 1-9: 18

## 2018-07-03 ENCOUNTER — PATIENT OUTREACH (OUTPATIENT)
Dept: CARE COORDINATION | Facility: CLINIC | Age: 53
End: 2018-07-03

## 2018-07-03 NOTE — PROGRESS NOTES
Clinic Care Coordination Contact  [facility]    Referral Source: Care Team -  CC was requested to call patient regarding social security disability benefits.  Clinical Data: Care Coordinator Outreach  Outreach attempted x 1. Unable to leave a voicemail message as the voicemail box was full  Plan: Care Coordinator will try to reach patient again in 1-2 business days.    Parvin Arteaga [facility]  Social Work Care Coordinator   [facility]  [phone]

## 2018-07-18 ENCOUNTER — TRANSFERRED RECORDS (OUTPATIENT)
Dept: HEALTH INFORMATION MANAGEMENT | Facility: CLINIC | Age: 53
End: 2018-07-18

## 2018-07-18 ENCOUNTER — TELEPHONE (OUTPATIENT)
Dept: FAMILY MEDICINE | Facility: CLINIC | Age: 53
End: 2018-07-18

## 2018-07-18 DIAGNOSIS — M51.369 L4-L5 DISC BULGE: Primary | ICD-10-CM

## 2018-07-18 DIAGNOSIS — G89.29 CHRONIC MIDLINE LOW BACK PAIN WITHOUT SCIATICA: ICD-10-CM

## 2018-07-18 DIAGNOSIS — M54.50 CHRONIC MIDLINE LOW BACK PAIN WITHOUT SCIATICA: ICD-10-CM

## 2018-07-18 DIAGNOSIS — M54.16 LUMBAR RADICULOPATHY: ICD-10-CM

## 2018-07-18 NOTE — TELEPHONE ENCOUNTER
Cruzito Orozco,  See below message  We do not refer outside of [facility] even for pain clinics correct?    Thank you,  Ericka Damon RN

## 2018-07-18 NOTE — TELEPHONE ENCOUNTER
Reason for Call: Request for an order or referral:    Order or referral being requested: Referral to a new pain clinic    Date needed: as soon as possible    Has the patient been seen by the PCP for this problem? YES    Additional comments:  Pt is looking at Speciality center at [facility].  Please fax to: [phone]  Please call the pt back to advise    Phone number Patient can be reached at:  Home number on file [phone] (home)    Best Time:  any    Can we leave a detailed message on this number?  YES    Call taken on 7/18/2018 at 2:56 PM by Diana Deng

## 2018-07-18 NOTE — TELEPHONE ENCOUNTER
LDVM - reviewed below information. If she would like a referral to a [facility] pain clinic then call back.  Ericka Damon RN

## 2018-07-18 NOTE — TELEPHONE ENCOUNTER
Cruzito Barnett- That is correct. We have pain clinics within  and our [facility] network.    Pam Worthington  Referral Coordinator

## 2018-07-19 NOTE — TELEPHONE ENCOUNTER
You can refer juliana to [facility] clinic in [place]. They are in our [facility] network. This clinic is where Dr Contreras is at.    Pam Worthington  Referral Coordinator

## 2018-07-19 NOTE — TELEPHONE ENCOUNTER
If she doesn't want to go to either of those pain clinic she will have to call her insurance to see where she can go with in her network. I don't think she needs referrals but there is no copy of her card on file so don't know for sure.    Pam Worthington  Referral Coordinator

## 2018-07-19 NOTE — TELEPHONE ENCOUNTER
Hello - I placed referrals to the  and [facility] pain centers - she can try these too    Jessica Flores MD

## 2018-07-19 NOTE — TELEPHONE ENCOUNTER
SN  Pt would like referral to pain clinic. [facility] Referral is pended. Please approve if appropriate  Ericka Damon, RN      Team please fax referral once approved, thank you

## 2018-07-19 NOTE — TELEPHONE ENCOUNTER
Pam/  Called patient.  [facility] is the place she is trying to get away from.  She doesn't see  Will only his NP and they told her there was nothing else they could do for her.    Gayle Slater RN      .

## 2018-07-31 ENCOUNTER — TELEPHONE (OUTPATIENT)
Dept: FAMILY MEDICINE | Facility: CLINIC | Age: 53
End: 2018-07-31

## 2018-07-31 NOTE — TELEPHONE ENCOUNTER
Pam,   Patient tried to call at schedule at [facility] pain clinic  States she was told there is no order  There is a referral with [facility] listed from 7/18/2018  Do you know what further is needed?  Ghazala CHU RN

## 2018-07-31 NOTE — TELEPHONE ENCOUNTER
Reason for Call: Request for an order or referral:    Order or referral being requested: pain clinic    Date needed: as soon as possible    Has the patient been seen by the PCP for this problem? YES    Additional comments: I let the patient know the orders were in the computer for the pain clinic. She still wants a return phone call.    Phone number Patient can be reached at:  Home number on file [phone] (home)    Best Time:  anytime    Can we leave a detailed message on this number?  YES    Call taken on 7/31/2018 at 6:27 PM by Ericka Harry

## 2018-08-01 NOTE — TELEPHONE ENCOUNTER
Called pain clinic and they can see the referral in her chart. I was told some schedulers don't look under the referral tab just the order tab. This referral was not under the orders tab. I will call patient and let her know.    Pam Worthington  Referral Coordinator

## 2018-08-06 ENCOUNTER — PATIENT OUTREACH (OUTPATIENT)
Dept: CARE COORDINATION | Facility: CLINIC | Age: 53
End: 2018-08-06

## 2018-08-06 NOTE — PROGRESS NOTES
Clinic Care Coordination Contact  [facility]/Voicemail       Clinical Data: Care Coordinator Outreach  Outreach attempted.  Left message on voicemail with call back information and requested return call.  Plan:  Care Coordinator will try to reach patient again in 5-7 business days.

## 2018-08-22 DIAGNOSIS — F41.9 ANXIETY: ICD-10-CM

## 2018-08-23 RX ORDER — HYDROXYZINE PAMOATE 25 MG/1
CAPSULE ORAL
Qty: 60 CAPSULE | Refills: 1 | Status: SHIPPED | OUTPATIENT
Start: 2018-08-23 | End: 2018-10-18

## 2018-08-23 NOTE — TELEPHONE ENCOUNTER
"Prescription approved per [facility] Refill Protocol.  Ghazala CHU RN    Requested Prescriptions   Pending Prescriptions Disp Refills     hydrOXYzine (VISTARIL) 25 MG capsule [Pharmacy Med Name: HYDROXYZINE MILI 25 MG CAP] 60 capsule 1     Sig: TAKE 2 CAPSULES (50 MG) BY MOUTH NIGHTLY AS NEEDED FOR ANXIETY    Antihistamines Protocol Passed    8/22/2018  6:23 PM       Passed - Recent (12 mo) or future (30 days) visit within the authorizing provider's specialty    Patient had office visit in the last 12 months or has a visit in the next 30 days with authorizing provider or within the authorizing provider's specialty.  See \"Patient Info\" tab in inbasket, or \"Choose Columns\" in Meds & Orders section of the refill encounter.           Passed - Patient is age 3 or older    Apply age and/or weight-based dosing for peds patients age 3 and older.    Forward request to provider for patients under the age of 3.              "

## 2018-08-28 DIAGNOSIS — F41.1 ANXIETY STATE: ICD-10-CM

## 2018-08-28 DIAGNOSIS — M51.369 L4-L5 DISC BULGE: ICD-10-CM

## 2018-08-29 RX ORDER — TRAZODONE HYDROCHLORIDE 100 MG/1
TABLET ORAL
Start: 2018-08-29

## 2018-08-29 NOTE — TELEPHONE ENCOUNTER
Routing refill request to provider for review/approval because:  Drug not on the [facility] refill protocol   Please authorize if appropriate.  Thanks,  Radha Flores RN            Requested Prescriptions   Pending Prescriptions Disp Refills     tiZANidine (ZANAFLEX) 4 MG tablet [Pharmacy Med Name: TIZANIDINE TAB 4MG] 160 tablet 13     Sig: TAKE 2 TABLETS (=8MG) 3    TIMES DAILY    There is no refill protocol information for this order

## 2018-09-11 ENCOUNTER — PATIENT OUTREACH (OUTPATIENT)
Dept: CARE COORDINATION | Facility: CLINIC | Age: 53
End: 2018-09-11

## 2018-09-11 NOTE — LETTER
[facility]  Complex Care Plan  About Me  Patient Name:  Sima Solares    YOB: 1965  Age:     53 year old   Arelis MRN:   [id number] Telephone Information:    Home Phone [phone]   Mobile [phone]       Address:    [address]  [place] [address] Email address:  [email or web address]      Emergency Contact(s)  Name Relationship Lgl Grd Work Phone Home Phone Mobile Phone   1. TIGRE SOLARES Mother    [phone]   2. REESE SALAZAR Friend    [phone]           Primary language:  English     needed? No   [facility]:  [phone] op. 1  Other communication barriers:    Preferred Method of Communication:  Mail  Current living arrangement: Other (lives with boyfriend )  Mobility Status/ Medical Equipment:      Health Maintenance  Health Maintenance Reviewed:      My Access Plan  Medical Emergency 911   [facility] [phone]   24 Hour Appointment Line [phone] or  [phone]) (toll-free)   24 Hour Nurse Line [phone] (toll-free)   Preferred Urgent Care     Preferred Hospital [facility]  [phone]   Preferred Pharmacy [id number] - [place], [place] - [address]     Behavioral Health Crisis Line The National Suicide Prevention Lifeline at 1-443.691.6571 or 911     My Care Team Members    Patient Care Team       Relationship Specialty Notifications Start End    Jessica Flores MD PCP - General Family Practice  7/10/15     Phone: [phone] Fax: [phone]         [address] [place] [address]    Farnaz Smith, RN Lead Care Coordinator Primary Care - [facility] Admissions 6/28/18     Phone: [phone] Fax: [phone]        Parvin Arteaga LGSW Clinic Care Coordinator Primary Care - [facility]  7/3/18     Phone: [phone] Fax: [phone]                My Care Plans  Self Management and Treatment  Plan  Goals and (Comments)  Goals        General    Financial Wellbeing (pt-stated)     Notes - Note created  9/11/2018  3:31 PM by Farnaz Smith, RN    Goal 1  Statement: I will continue to pay for [employer] insurance through October when I will enroll on my boyfriend's insurance.   Measure of Success: completed.   Supportive Steps to Achieve:   Barriers:   Strengths:   Date to Achieve By: 11/1/18  Patient expressed understanding of goal: yes                 Action Plans on File:                       Advance Care Plans/Directives Type:        My Medical and Care Information  Problem List   Patient Active Problem List   Diagnosis     Lumbar radiculopathy     Ulcerative colitis without complications (H)     L4-L5 disc bulge     Anxiety state     Major depressive disorder, recurrent episode, moderate (H)     Hypertension goal BP (blood pressure) < 140/90     Migraine without aura and without status migrainosus, not intractable     Midline low back pain without sciatica     Orthopedic aftercare     Primary insomnia      Current Medications and Allergies:  See printed Medication Report.    Care Coordination Start Date: No linked episodes   Frequency of Care Coordination:     Form Last Updated: 09/11/2018

## 2018-09-11 NOTE — PROGRESS NOTES
"Clinic Care Coordination Contact    Clinic Care Coordination Contact  OUTREACH    Referral Information:  Referral Source: Care Team     Utilization    Last refreshed: 9/4/2018  8:30 PM:  No Show Count (past year) 1       Last refreshed: 9/4/2018  8:30 PM:  ED visits 0       Last refreshed: 9/4/2018  8:30 PM:  Hospital admissions 0          Current as of: 9/4/2018  8:30 PM             Clinical Concerns:  Current Medical Concerns:    Clinic Care Coordinator RN followed up with patient.  Patient states she is currently paying for Cobra.  She plans to pay for Cob[employer] through October when she can enroll on her live in boyfriends insurance.    Patient is currently receiving LTD (until April 2019)  Patient has an appointment with a  to discuss applying for SSD on 9/13/18.   Patient reports she is \"doing great\"  She could talk long because she is going for a walk.  Patient agreed to follow up from Clinic Care Coordinator RN in one month. Patient agreed to call with questions or concerns.             Medication Management:  Patient states she is \"off pain medication \"   Patient uses mail order pharmacy     Functional Status: see above        Living Situation:  Current living arrangement:: Other (lives with boyfriend )    Financial/Insurance: see above         Goals:   Goals        General    Financial Wellbeing (pt-stated)     Notes - Note created  9/11/2018  3:31 PM by Farnaz Smith RN    Goal 1  Statement: I will continue to pay for [employer] insurance through October when I will enroll on my boyfriend's insurance.   Measure of Success: completed.   Supportive Steps to Achieve:   Barriers:   Strengths:   Date to Achieve By: 11/1/18  Patient expressed understanding of goal: yes                  Patient/Caregiver understanding: yes           Plan: Patient agreed to follow up from Clinic Care Coordinator RN in one month. Patient agreed to call with questions or concerns.     "

## 2018-10-14 DIAGNOSIS — F51.01 PRIMARY INSOMNIA: ICD-10-CM

## 2018-10-15 RX ORDER — TRAZODONE HYDROCHLORIDE 100 MG/1
TABLET ORAL
Qty: 90 TABLET | Refills: 2 | Status: SHIPPED | OUTPATIENT
Start: 2018-10-15 | End: 2018-12-18

## 2018-10-15 NOTE — TELEPHONE ENCOUNTER
"Pharmacy change to mail order  Prescription approved per [facility] Refill Protocol.  Ghazala CHU RN    Requested Prescriptions   Pending Prescriptions Disp Refills     traZODone (DESYREL) 100 MG tablet [Pharmacy Med Name: TRAZODONE TAB 100MG] 90 tablet 0     Sig: TAKE 1 TABLET NIGHTLY AS   NEEDED FOR SLEEP    Serotonin Modulators Passed    10/14/2018  2:12 AM       Passed - Recent (12 mo) or future (30 days) visit within the authorizing provider's specialty    Patient had office visit in the last 12 months or has a visit in the next 30 days with authorizing provider or within the authorizing provider's specialty.  See \"Patient Info\" tab in inbasket, or \"Choose Columns\" in Meds & Orders section of the refill encounter.           Passed - Patient is age 18 or older       Passed - No active pregnancy on record       Passed - No positive pregnancy test in past 12 months            "

## 2018-10-18 DIAGNOSIS — F41.9 ANXIETY: ICD-10-CM

## 2018-10-18 NOTE — TELEPHONE ENCOUNTER
"SN  Routing refill request to provider for review/approval because:  Patient asking for 90 day supply.  Last refill was for #60 with 1 refill.    Last OV 6/28/18  Thanks, Gayle Slater RN    Requested Prescriptions   Pending Prescriptions Disp Refills     hydrOXYzine (VISTARIL) 25 MG capsule 180 capsule 0     Sig: TAKE 2 CAPSULES (50 MG) BY MOUTH NIGHTLY AS NEEDED FOR ANXIETY    Antihistamines Protocol Passed    10/18/2018  9:07 AM       Passed - Recent (12 mo) or future (30 days) visit within the authorizing provider's specialty    Patient had office visit in the last 12 months or has a visit in the next 30 days with authorizing provider or within the authorizing provider's specialty.  See \"Patient Info\" tab in inbasket, or \"Choose Columns\" in Meds & Orders section of the refill encounter.             Passed - Patient is age 3 or older    Apply age and/or weight-based dosing for peds patients age 3 and older.    Forward request to provider for patients under the age of 3.                    "

## 2018-10-21 DIAGNOSIS — F41.9 ANXIETY: ICD-10-CM

## 2018-10-22 RX ORDER — HYDROXYZINE PAMOATE 25 MG/1
CAPSULE ORAL
Qty: 180 CAPSULE | Refills: 0 | Status: SHIPPED | OUTPATIENT
Start: 2018-10-22

## 2018-10-22 RX ORDER — HYDROXYZINE PAMOATE 25 MG/1
CAPSULE ORAL
Start: 2018-10-22

## 2018-10-22 NOTE — TELEPHONE ENCOUNTER
"Duplicate.  Refilled today.  Gayle Slater RN    Requested Prescriptions   Pending Prescriptions Disp Refills     hydrOXYzine (VISTARIL) 25 MG capsule [Pharmacy Med Name: HYDROXYZINE MILI 25 MG CAP] 60 capsule 1     Sig: TAKE 2 CAPSULES (50 MG) BY MOUTH NIGHTLY AS NEEDED FOR ANXIETY    Antihistamines Protocol Passed    10/21/2018  3:35 AM       Passed - Recent (12 mo) or future (30 days) visit within the authorizing provider's specialty    Patient had office visit in the last 12 months or has a visit in the next 30 days with authorizing provider or within the authorizing provider's specialty.  See \"Patient Info\" tab in inbasket, or \"Choose Columns\" in Meds & Orders section of the refill encounter.             Passed - Patient is age 3 or older    Apply age and/or weight-based dosing for peds patients age 3 and older.    Forward request to provider for patients under the age of 3.            "

## 2018-10-26 ENCOUNTER — PATIENT OUTREACH (OUTPATIENT)
Dept: CARE COORDINATION | Facility: CLINIC | Age: 53
End: 2018-10-26

## 2018-10-26 NOTE — PROGRESS NOTES
Clinic Care Coordination Contact  [facility]/Voicemail       Clinical Data: Care Coordinator Outreach  Follow up medical and insurance.   Outreach attempted .  Left message on voicemail with call back information and requested return call.  Plan:  Care Coordinator will try to reach patient again in 3-5 business days.

## 2018-11-13 NOTE — PROGRESS NOTES
"Clinic Care Coordination Contact    Situation: Patient chart reviewed by care coordinator.    Background: SW was requested to outreach by RN CC due to concerns regarding social security disability benefits    Assessment: SW outreach overdue. SW completed chart review and RN CC has continued to outreach. RN CC's note states:    Clinic Care Coordinator RN followed up with patient.  Patient states she is currently paying for [employer].  She plans to pay for [employer] through October when she can enroll on her live in [employer] insurance.    Patient is currently receiving LTD (until April 2019)  Patient has an appointment with a  to discuss applying for SSD on 9/13/18.   Patient reports she is \"doing great\"  She could talk long because she is going for a walk.  Patient agreed to follow up from Clinic Care Coordinator RN in one month. Patient agreed to call with questions or concerns.    Plan/Recommendations: SW will do no further outreach at this time. SW is available if needed.    Parvin Arteaga [facility]  Social Work Care Coordinator   [facility] [place]  [phone]            "

## 2018-12-13 ENCOUNTER — PATIENT OUTREACH (OUTPATIENT)
Dept: CARE COORDINATION | Facility: CLINIC | Age: 53
End: 2018-12-13

## 2018-12-13 NOTE — PROGRESS NOTES
Clinic Care Coordination Contact  [facility]/Voicemail       Clinical Data: Care Coordinator Outreach  Outreach attempted.  Left message on voicemail with call back information and requested return call.  Plan: Care Coordinator will try to reach patient again in 3-5 business days.

## 2018-12-18 DIAGNOSIS — F51.01 PRIMARY INSOMNIA: ICD-10-CM

## 2018-12-18 RX ORDER — TRAZODONE HYDROCHLORIDE 100 MG/1
TABLET ORAL
Qty: 90 TABLET | Refills: 1 | Status: SHIPPED | OUTPATIENT
Start: 2018-12-18 | End: 2019-02-28

## 2019-01-09 ENCOUNTER — PATIENT OUTREACH (OUTPATIENT)
Dept: CARE COORDINATION | Facility: CLINIC | Age: 54
End: 2019-01-09

## 2019-01-09 NOTE — PROGRESS NOTES
Clinic Care Coordination Contact  Care Team Conversations    Chart review.   Clinic Care Coordinator RN will attempt outreach to patient regarding insurance issues.

## 2019-01-09 NOTE — LETTER
[facility]  Complex Care Plan  About Me  Patient Name:  Sima Solares    YOB: 1965  Age:     53 year old   Arelis MRN:   [id number] Telephone Information:  Home Phone [phone]   Mobile [phone]       Address:    [address]  [place] [address] Email address:  [email or web address]      Emergency Contact(s)  Name Relationship Lgl Grd Work Phone Home Phone Mobile Phone   1. TIGRE SOLARES Mother    [phone]   2. REESE SALAZAR Friend    [phone]           Primary language:  English     needed? No   [place] [facility]:  [phone] op. 1  Other communication barriers:    Preferred Method of Communication:  Mail  Current living arrangement:    Mobility Status/ Medical Equipment:      Health Maintenance  Health Maintenance Reviewed:      My Access Plan  Medical Emergency 911   Primary Clinic Line [facility] [place] - [phone]   24 Hour Appointment Line [phone] or  [phone]) (toll-free)   24 Hour Nurse Line [phone] (toll-free)   Preferred Urgent Care     Preferred Hospital     Preferred Pharmacy [id number] - [place], [place] - [address]     Behavioral Health Crisis Line The National Suicide Prevention Lifeline at 1-572.962.3173 or 914     My Care Team Members    Patient Care Team       Relationship Specialty Notifications Start End    Jessica Flores MD PCP - General Family Practice  7/10/15     Phone: [phone] Fax: [phone]         [address] [address]    Jessica Flores MD PCP - Assigned PCP   6/12/15     Phone: [phone] Fax: [phone]         [address] [address]    Farnaz Smith, RN Lead Care Coordinator Primary Care - CC Admissions 6/28/18     Phone: [phone] Fax: [phone]                My Care Plans  Self Management and Treatment Plan  Goals and (Comments)  Goals         General    Financial Wellbeing (pt-stated)     Notes - Note created  9/11/2018  3:31 PM by Farnaz Smith, RN    Goal 1  Statement: I will continue to pay for [employer] insurance through October when I will enroll on my boyfriend's insurance.   Measure of Success: completed.   Supportive Steps to Achieve:   Barriers:   Strengths:   Date to Achieve By: 11/1/18  Patient expressed understanding of goal: yes                 Action Plans on File:                       Advance Care Plans/Directives Type:        My Medical and Care Information  Problem List   Patient Active Problem List   Diagnosis     Lumbar radiculopathy     Ulcerative colitis without complications (H)     L4-L5 disc bulge     Anxiety state     Major depressive disorder, recurrent episode, moderate (H)     Hypertension goal BP (blood pressure) < 140/90     Migraine without aura and without status migrainosus, not intractable     Midline low back pain without sciatica     Orthopedic aftercare     Primary insomnia      Current Medications and Allergies:  See printed Medication Report.    Care Coordination Start Date: No linked episodes   Frequency of Care Coordination:     Form Last Updated: 01/09/2019

## 2019-01-25 ENCOUNTER — TELEPHONE (OUTPATIENT)
Dept: FAMILY MEDICINE | Facility: CLINIC | Age: 54
End: 2019-01-25

## 2019-01-25 NOTE — TELEPHONE ENCOUNTER
Summary:    Patient is due/failing the following:   BP CHECK, COLONOSCOPY and MAMMOGRAM    Type of outreach:  Phone, spoke to patient.  patient has new ins and is going to call back to schedule f/u appt if her plan is [place]  Action needed: Patient needs office visit for BP recheck and med review.    If need for provider review:    Please indicate OV, lab, MTM, or nurse appt if needed.  Indicate fasting or not fasting.                                                                                                                                          ELIS Byrd

## 2019-01-29 DIAGNOSIS — R05.9 COUGH: ICD-10-CM

## 2019-01-30 RX ORDER — ALBUTEROL SULFATE 90 UG/1
AEROSOL, METERED RESPIRATORY (INHALATION)
Qty: 18 INHALER | Refills: 0 | Status: SHIPPED | OUTPATIENT
Start: 2019-01-30

## 2019-01-30 NOTE — TELEPHONE ENCOUNTER
"LS    Please address due to SN's absence.  Routing refill request to provider for review/approval because:  Drug not on the [facility] refill protocol for Dx: Cough.  Last Rx 1/26/18 for #1 with 3 refills  Last OV 6/28/18 - physical.    Thank you,  Gayle Slater RN    Requested Prescriptions   Pending Prescriptions Disp Refills     VENTOLIN  (90 Base) MCG/ACT inhaler [Pharmacy Med Name: VENTOLIN HFA 90 MCG INHALER] 18 Inhaler 0     Sig: INHALE 2 PUFFS INTO THE LUNGS EVERY 6 HOURS AS NEEDED FOR SHORTNESS OF BREATH / DYSPNEA OR WHEEZING    Asthma Maintenance Inhalers - Anticholinergics Passed - 1/29/2019 12:06 PM       Passed - Patient is age 12 years or older       Passed - Recent (12 mo) or future (30 days) visit within the authorizing provider's specialty    Patient had office visit in the last 12 months or has a visit in the next 30 days with authorizing provider or within the authorizing provider's specialty.  See \"Patient Info\" tab in inbasket, or \"Choose Columns\" in Meds & Orders section of the refill encounter.             Passed - Medication is active on med list                  "

## 2019-02-11 ENCOUNTER — PATIENT OUTREACH (OUTPATIENT)
Dept: CARE COORDINATION | Facility: CLINIC | Age: 54
End: 2019-02-11

## 2019-02-11 NOTE — PROGRESS NOTES
Clinic Care Coordination Contact  Care Team Conversations    Patient has new insurance.   Patient has appointment with PCP on 2/18/19.   Clinic Care Coordinator RN will follow up after.

## 2019-02-25 ENCOUNTER — PATIENT OUTREACH (OUTPATIENT)
Dept: CARE COORDINATION | Facility: CLINIC | Age: 54
End: 2019-02-25

## 2019-02-28 ENCOUNTER — OFFICE VISIT (OUTPATIENT)
Dept: FAMILY MEDICINE | Facility: CLINIC | Age: 54
End: 2019-02-28
Payer: COMMERCIAL

## 2019-02-28 ENCOUNTER — PATIENT OUTREACH (OUTPATIENT)
Dept: CARE COORDINATION | Facility: CLINIC | Age: 54
End: 2019-02-28

## 2019-02-28 VITALS
DIASTOLIC BLOOD PRESSURE: 89 MMHG | OXYGEN SATURATION: 96 % | SYSTOLIC BLOOD PRESSURE: 134 MMHG | WEIGHT: 176.9 LBS | BODY MASS INDEX: 26.2 KG/M2 | HEIGHT: 69 IN | HEART RATE: 74 BPM

## 2019-02-28 DIAGNOSIS — G43.009 MIGRAINE WITHOUT AURA AND WITHOUT STATUS MIGRAINOSUS, NOT INTRACTABLE: ICD-10-CM

## 2019-02-28 DIAGNOSIS — F51.01 PRIMARY INSOMNIA: ICD-10-CM

## 2019-02-28 DIAGNOSIS — M51.369 L4-L5 DISC BULGE: ICD-10-CM

## 2019-02-28 PROCEDURE — 99214 OFFICE O/P EST MOD 30 MIN: CPT | Performed by: FAMILY MEDICINE

## 2019-02-28 RX ORDER — RIZATRIPTAN BENZOATE 10 MG/1
10 TABLET ORAL
Qty: 18 TABLET | Refills: 11 | Status: SHIPPED | OUTPATIENT
Start: 2019-02-28

## 2019-02-28 RX ORDER — TRAZODONE HYDROCHLORIDE 100 MG/1
TABLET ORAL
Qty: 90 TABLET | Refills: 1 | Status: SHIPPED | OUTPATIENT
Start: 2019-02-28

## 2019-02-28 RX ORDER — TRAMADOL HYDROCHLORIDE 50 MG/1
50 TABLET ORAL
Qty: 12 TABLET | Refills: 1 | Status: SHIPPED | OUTPATIENT
Start: 2019-02-28 | End: 2019-03-03

## 2019-02-28 ASSESSMENT — MIFFLIN-ST. JEOR: SCORE: 1466.79

## 2019-02-28 ASSESSMENT — ACTIVITIES OF DAILY LIVING (ADL): DEPENDENT_IADLS:: INDEPENDENT

## 2019-02-28 NOTE — NURSING NOTE
"Chief Complaint   Patient presents with     Recheck Medication     /85   Pulse 74   Ht 1.753 m (5' 9\")   Wt 80.2 kg (176 lb 14.4 oz)   SpO2 96%   BMI 26.12 kg/m   Estimated body mass index is 26.12 kg/m  as calculated from the following:    Height as of this encounter: 1.753 m (5' 9\").    Weight as of this encounter: 80.2 kg (176 lb 14.4 oz).        Health Maintenance due pending provider review:  Mammogram and Colonoscopy/FIT    Aware due, will discuss at a later time    Batool Quach CMA  "

## 2019-02-28 NOTE — PROGRESS NOTES
SUBJECTIVE:   Sima Ross is a 53 year old female who presents to clinic today for the following health issues:      Medication Followup of all medications    Taking Medication as prescribed: yes    Side Effects:  None    Medication Helping Symptoms:  yes   Tizanidine and gabpentin  Taking 1200 mg three times per day since for a few years  Has dryness of teeth  Wonders about trying small dose trazodone for flares - she is back to working and at times has increased pain that wakes her in the night  Denies any other narcotics      Problem list and histories reviewed & adjusted, as indicated.  Additional history: as documented    Patient Active Problem List   Diagnosis     Lumbar radiculopathy     Ulcerative colitis without complications (H)     L4-L5 disc bulge     Anxiety state     Major depressive disorder, recurrent episode, moderate (H)     Hypertension goal BP (blood pressure) < 140/90     Migraine without aura and without status migrainosus, not intractable     Midline low back pain without sciatica     Orthopedic aftercare     Primary insomnia     Past Surgical History:   Procedure Laterality Date     ANKLE SURGERY Right      BACK SURGERY  2009    LUMBAR DISCECTOMY L4-L5     BACK SURGERY  2016    LUMBAR FUSION     COLONOSCOPY       HYSTERECTOMY  7/2014     INSERT STIMULATOR AND LEADS INTERNAL DORSAL COLUMN N/A 11/21/2017    Procedure: INSERT STIMULATOR AND LEADS INTERNAL DORSAL COLUMN;  SPINAL CORD STIMULATOR IMPLANT ;  Surgeon: Geno Domínguez DO;  Location: [facility] OR     LAPAROSCOPIC HYSTERECTOMY TOTAL  2013       Social History     Tobacco Use     Smoking status: Former Smoker     Last attempt to quit: 11/15/2015     Years since quitting: 3.2     Smokeless tobacco: Never Used   Substance Use Topics     Alcohol use: Yes     Comment: 2x per month     Family History   Problem Relation Age of Onset     Hypertension Mother      Thyroid Disease Mother      Hypertension Maternal Grandmother      Hypertension  "Paternal Grandfather      Breast Cancer Paternal Grandmother      Breast Cancer Maternal Aunt          Current Outpatient Medications   Medication Sig Dispense Refill     dicyclomine (BENTYL) 20 MG tablet Take 1 tablet (20 mg) by mouth every 6 hours 360 tablet 3     estradiol (ESTRACE) 1 MG tablet TAKE 1 TABLET DAILY. DUE   FOR PHYSICAL 30 tablet 0     gabapentin (NEURONTIN) 600 MG tablet Take 1 tablet (600 mg) by mouth 3 times daily 90 tablet 11     rizatriptan (MAXALT) 10 MG tablet Take 1 tablet (10 mg) by mouth at onset of headache for migraine May repeat dose in 2 hours.  Do not exceed 30 mg in 24 hours 18 tablet 11     Telmisartan-HCTZ 40-12.5 MG TABS Take 1 tablet by mouth daily 90 tablet 3     tiZANidine (ZANAFLEX) 4 MG tablet TAKE 2 TABLETS (=8MG) 3    TIMES DAILY 160 tablet 1     traMADol (ULTRAM) 50 MG tablet Take 1 tablet (50 mg) by mouth nightly as needed for severe pain OK for max of 12 per month 12 tablet 1     traZODone (DESYREL) 100 MG tablet TAKE 1 TABLET NIGHTLY AS   NEEDED FOR SLEEP 90 tablet 1     valACYclovir (VALTREX) 500 MG tablet TAKE 1 TABLET DAILY 30 tablet 0     VENTOLIN  (90 Base) MCG/ACT inhaler INHALE 2 PUFFS INTO THE LUNGS EVERY 6 HOURS AS NEEDED FOR SHORTNESS OF BREATH / DYSPNEA OR WHEEZING 18 Inhaler 0     hydrOXYzine (VISTARIL) 25 MG capsule TAKE 2 CAPSULES (50 MG) BY MOUTH NIGHTLY AS NEEDED FOR ANXIETY (Patient not taking: Reported on 2/28/2019) 180 capsule 0       Reviewed and updated as needed this visit by clinical staff  Tobacco  Allergies  Meds  Med Hx  Surg Hx  Fam Hx  Soc Hx      Reviewed and updated as needed this visit by Provider         ROS:  Constitutional, HEENT, cardiovascular, pulmonary, gi and gu systems are negative, except as otherwise noted.    OBJECTIVE:                                                    /89   Pulse 74   Ht 1.753 m (5' 9\")   Wt 80.2 kg (176 lb 14.4 oz)   SpO2 96%   BMI 26.12 kg/m    Body mass index is 26.12 " kg/m .  GENERAL APPEARANCE: healthy, alert and no distress         ASSESSMENT/PLAN:                                                    1. L4-L5 disc bulge  Meds reviewed  - tiZANidine (ZANAFLEX) 4 MG tablet; TAKE 2 TABLETS (=8MG) 3    TIMES DAILY  Dispense: 160 tablet; Refill: 1  - traMADol (ULTRAM) 50 MG tablet; Take 1 tablet (50 mg) by mouth nightly as needed for severe pain OK for max of 12 per month  Dispense: 12 tablet; Refill: 1    2. Primary insomnia    - traZODone (DESYREL) 100 MG tablet; TAKE 1 TABLET NIGHTLY AS   NEEDED FOR SLEEP  Dispense: 90 tablet; Refill: 1    3. Migraine without aura and without status migrainosus, not intractable    - rizatriptan (MAXALT) 10 MG tablet; Take 1 tablet (10 mg) by mouth at onset of headache for migraine May repeat dose in 2 hours.  Do not exceed 30 mg in 24 hours  Dispense: 18 tablet; Refill: 11      Follow up with Provider - 3 months     Jessica Flores MD  [facility]

## 2019-02-28 NOTE — PATIENT INSTRUCTIONS
PROCEDURE ONLY - COLONOSCOPY - Reason for procedure: Screening  [facility]: [place] [facility] (for [place] and [facility], please use the selections above) - [place] [phone]   [email or web address]  [place] [phone]   [email or web address]    Dr. Adrian    PLEASE CALL TO SCHEDULE YOUR MAMMOGRAM  [facility] [phone]  [facility] [phone]  [facility]   [phone]  Central Scheduling (all locations) [phone]

## 2019-02-28 NOTE — PROGRESS NOTES
"Clinic Care Coordination Contact    Clinic Care Coordination Contact  OUTREACH    Referral Information:  Referral Source: PCP         Chief Complaint   Patient presents with     Clinic Care Coordination - Face To Face     Clinic Care Coordination - Follow-up        [place] Utilization:   Clinic Utilization  Difficulty keeping appointments:: No  Compliance Concerns: No  No-Show Concerns: No  No PCP office visit in Past Year: No  Utilization    Last refreshed: 2/28/2019  3:54 AM:  Hospital Admissions 0           Last refreshed: 2/28/2019  3:54 AM:  ED Visits 0           Last refreshed: 2/28/2019  3:54 AM:  No Show Count (past year) 1              Current as of: 2/28/2019  3:54 AM              Clinical Concerns:  Current Medical Concerns:  Clinic Care Coordinator RN meet with patient in clinic today.   Patient reports her pain is controlled with medical marijuana. Patient reports she no longer takes opioids.      Patient was able to get on her live in [employer] insurance.     Patient reports she just received denial from SSD.  Patient originally hired a  but she felt they were not helpful.  She is reconsidering hiring a  after this denial.  Patient's long term disability runs out in March 2019. (2 years max on policy)  Patient states she is in denial that she \"won't wake up better tomorrow and be able to go to work\"    Clinic Care Coordinator RN told patient she needs to work on SSD until she is well enough to return to work.  Patient agreed to follow up by Clinic Care Coordinator .   Clinic Care Coordinator RN also encouraged patient to start therapy to discuss changes to her life.  Clinic Care Coordinator RN also discussed this with PCP.            Functional Status:  Dependent ADLs:: Independent  Dependent IADLs:: Independent  Bed or wheelchair confined:: No  Mobility Status: Independent    Living Situation:  Current living arrangement:: Other(lives with boyfriend/S.O.)        "   Psychosocial:  Informal Support system:: Friends, Family, Significant other     Financial/Insurance:   Financial/Insurance concerns (GOAL):: Yes  See above     Resources and Interventions:  Current Resources:    ;      Supplies used at home:: None  Equipment Currently Used at Home: none    Advance Care Plan/Directive  Advanced Care Plans/Directives on file:: No          Goals:   Goals        General    Financial Wellbeing (pt-stated)     Notes - Note created  9/11/2018  3:31 PM by Farnaz Smith RN    Goal 1  Statement: I will continue to pay for [employer] insurance through October when I will enroll on my boyfriend's insurance.   Measure of Success: completed.   Supportive Steps to Achieve:   Barriers:   Strengths:   Date to Achieve By: 11/1/18  Patient expressed understanding of goal: yes          Financial Wellbeing 1 (pt-stated)         Goal 1  Statement: I will discuss SSD with Clinic Care Coordinator [place]  Measure of Success: completed  Supportive Steps to Achieve: routed to Clinic Care Coordinator [place]  Barriers: motivation   Strengths: need for income  Date to Achieve By: 4/1/19  Patient expressed understanding of goal: yes          Patient/Caregiver understanding: yes     Outreach Frequency: monthly      Plan: Routed to Parvin Arteaga

## 2019-03-01 ENCOUNTER — PATIENT OUTREACH (OUTPATIENT)
Dept: CARE COORDINATION | Facility: CLINIC | Age: 54
End: 2019-03-01

## 2019-03-01 ASSESSMENT — ACTIVITIES OF DAILY LIVING (ADL): DEPENDENT_IADLS:: INDEPENDENT

## 2019-03-01 NOTE — PROGRESS NOTES
Clinic Care Coordination Contact  [facility]/Voicemail    Referral Source: PCP    MON[place]NA received hand-off from RN ISABELA. RN CC met with patient in person and at that meeting patient stated she was denied SSD. SW was requested to outreach and assess for needs/resources.    Clinical Data: Care Coordinator Outreach  Outreach attempted x 1.  Left message on voicemail with call back information and requested return call.  Plan: Care Coordinator will try to reach patient again in 1-2 business days.    Parvin Arteaga [facility]  Social Work Care Coordinator   [facility] [place]  [phone]

## 2019-03-05 ASSESSMENT — ACTIVITIES OF DAILY LIVING (ADL): DEPENDENT_IADLS:: INDEPENDENT

## 2019-03-05 NOTE — PROGRESS NOTES
Clinic Care Coordination Contact  [facility]     Referral Source: PCP     MONTANA received hand-off from RN CC. RN CC met with patient in person and at that meeting patient stated she was denied SSD. SW was requested to outreach and assess for needs/resources.     Clinical Data: Care Coordinator Outreach  Outreach attempted x 2.  Left message on voicemail with call back information and requested return call.  Plan: Care Coordinator will wait 1 week for patient response. If no response, will update RN CC for further outreach.     Parvin Arteaga [facility]  Social Work Care Coordinator   [facility]  [phone]

## 2019-03-29 ENCOUNTER — PATIENT OUTREACH (OUTPATIENT)
Dept: CARE COORDINATION | Facility: CLINIC | Age: 54
End: 2019-03-29

## 2019-03-29 ASSESSMENT — ACTIVITIES OF DAILY LIVING (ADL): DEPENDENT_IADLS:: INDEPENDENT

## 2019-03-29 NOTE — PROGRESS NOTES
Clinic Care Coordination Contact  [facility]/Voicemail       Clinical Data: Care Coordinator Outreach  Outreach attempted.  Left message on voicemail with call back information and requested return call.  Plan:  Care Coordinator will try to reach patient again in the next month.

## 2019-04-03 NOTE — PROGRESS NOTES
Clinic Care Coordination Contact  [facility]/Voicemail    Referral Source: PCP    SW received hand-off from RN CC. RN CC met with patient in person and at that meeting patient stated she was denied SSD. SW was requested to outreach and assess for needs/resources.    Clinical Data: Care Coordinator Outreach  Outreach attempted x 3.  Left message on voicemail with call back information and requested return call.  Plan:  Care Coordinator will do no further outreaches at this time. RN CC is still following patient. SW can be requested at any time if needs arise.    Parvin Arteaga [place]  Social Work Care Coordinator   [facility]  [phone]

## 2019-04-09 DIAGNOSIS — M51.369 L4-L5 DISC BULGE: ICD-10-CM

## 2019-04-09 RX ORDER — GABAPENTIN 600 MG/1
600 TABLET ORAL 3 TIMES DAILY
Start: 2019-04-09

## 2019-04-09 NOTE — TELEPHONE ENCOUNTER
Pending Prescriptions:                       Disp   Refills    gabapentin (NEURONTIN) 600 MG tablet [Pha*90 tab*1            Sig: TAKE 1 TABLET (600 MG) BY MOUTH 3 TIMES DAILY          Last Written Prescription Date:  06/02/2018  Last Fill Quantity: 90,   # refills: 11  Last Office Visit: 02/28/2019  Future Office visit:       Routing refill request to provider for review/approval because:  Drug not on the [facility] or [facility] refill protocol or controlled substance

## 2019-04-24 DIAGNOSIS — M51.369 L4-L5 DISC BULGE: ICD-10-CM

## 2019-04-24 NOTE — TELEPHONE ENCOUNTER
SN  Routing refill request to provider for review/approval because:  Drug not on the [facility] refill protocol   Last Rx 2/28/19 for #160 with 1 refill  Last OV: 2/28/19    Thanks,  Gayle Slater RN    Requested Prescriptions   Pending Prescriptions Disp Refills     tiZANidine (ZANAFLEX) 4 MG tablet [Pharmacy Med Name: TIZANIDINE HCL 4 MG TABLET] 160 tablet 1     Sig: TAKE 2 TABLETS BY MOUTH THREE TIMES A DAY       There is no refill protocol information for this order

## 2019-04-29 ENCOUNTER — PATIENT OUTREACH (OUTPATIENT)
Dept: CARE COORDINATION | Facility: CLINIC | Age: 54
End: 2019-04-29

## 2019-04-29 ASSESSMENT — ACTIVITIES OF DAILY LIVING (ADL): DEPENDENT_IADLS:: INDEPENDENT

## 2019-04-29 NOTE — PROGRESS NOTES
Clinic Care Coordination Contact  [facility]/Voicemail    Referral Source: PCP  Clinical Data: Care Coordinator Outreach  Outreach attempted.  Left message on voicemail with call back information and requested return call.  Plan:  Care Coordinator will try to reach patient again in 3-5 business days.

## 2019-05-02 ENCOUNTER — TRANSFERRED RECORDS (OUTPATIENT)
Dept: HEALTH INFORMATION MANAGEMENT | Facility: CLINIC | Age: 54
End: 2019-05-02

## 2019-05-07 ENCOUNTER — PATIENT OUTREACH (OUTPATIENT)
Dept: CARE COORDINATION | Facility: CLINIC | Age: 54
End: 2019-05-07

## 2019-05-07 ASSESSMENT — ACTIVITIES OF DAILY LIVING (ADL): DEPENDENT_IADLS:: INDEPENDENT

## 2019-05-07 NOTE — PROGRESS NOTES
"Clinic Care Coordination Contact    Clinic Care Coordination Contact  OUTREACH    Referral Information:      Universal Utilization:      Utilization    Last refreshed: 5/6/2019 11:49 PM:  Hospital Admissions 0           Last refreshed: 5/6/2019 11:49 PM:  ED Visits 0           Last refreshed: 5/2/2019  5:09 PM:  No Show Count (past year) 1              Current as of: 5/2/2019  5:09 PM              Clinical Concerns:  Current Medical Concerns:  Clinic Care Coordinator RN followed up with patient by phone. Patient reports that her SSD claim was denied.  She has hired a  to help with her appeal. Patient reports her short term disability ended in April.  Patient states she plans to return to work in June.  Patient reports her pain is controlled with medical cannabis. Patient states \"I am a new person\"   Patient agreed to call Clinic Care Coordinator RN if she needs anything from clinic for her return to work.     Patient saw an endocrinologist and is scheduled for an ultasound in May.  Patient will follow up with endo after ultrasound.       Functional Status: see above        Living Situation: Patient lives with boyfriend.        Financial/Insurance: see above         Resources and Interventions:  Current Resources:    ;       Goals:   Goals        General    Financial Wellbeing (pt-stated)     Notes - Note created  5/7/2019  2:50 PM by Farnaz Smith, RN    Goal Statement: I will work with my  on SSD  Measure of Success: completed  Supportive Steps to Achieve: na  Barriers: na  Strengths: na  Date to Achieve By: on going until approved or final denial   Patient expressed understanding of goal: yes          Financial Wellbeing 1 (pt-stated)     Notes - Note created  2/28/2019  2:45 PM by Farnaz Smith, RN    Goal 1  Statement: I will discuss SSD with Clinic Care Coordinator SW  Measure of Success: completed  Supportive Steps to Achieve: routed to Clinic Care Coordinator SW  Barriers: motivation "   Strengths: need for income  Date to Achieve By: 4/1/19  Patient expressed understanding of goal: yes          Functional (pt-stated)     Notes - Note created  5/7/2019  2:53 PM by Farnaz Smith, RN    Goal Statement: I plan to return to work in June 2019  Measure of Success: completed  Supportive Steps to Achieve: I will call clinic if I need anything for my return to work.   Barriers: pain- pain controlled with medical cannabis per patient    Strengths: na  Date to Achieve By: 6/30/19  Patient expressed understanding of goal: yes                   Patient/Caregiver understanding: yes        Future Appointments              In 6 days [facility] Ultrasound, [place] [facility]          Plan: Patient will work towards goals.   Patient agreed to follow up  call from Clinic Care Coordinator RN in June 2019.  Patient verbalized understanding to call Clinic Care Coordinator RN with questions or concerns in the meantime.   Complex care plan mailed to patient.

## 2019-05-07 NOTE — LETTER
[facility]  Complex Care Plan  About Me:    Patient Name:  Sima Solares    YOB: 1965  Age:         53 year old   Arelis MRN:    [id number] Telephone Information:  Home Phone [phone]   Mobile [phone]       Address:  [address]  [facility] [address] Email address:  [email or web address]      Emergency Contact(s)    Name Relationship Lgl Grd Work Phone Home Phone Mobile Phone   1. TIGRE SOLARES Mother    [phone]   2. REESE SALAZAR Friend    [phone]           Primary language:  English     needed? No   [employer]:  [phone] op. 1  Other communication barriers:    Preferred Method of Communication:  Mail  Current living arrangement:    Mobility Status/ Medical Equipment:      Health Maintenance  Health Maintenance Reviewed:      My Access Plan  Medical Emergency 911   Primary Clinic Line [facility] - [phone]   24 Hour Appointment Line [phone] or  [phone]) (toll-free)   24 Hour Nurse Line [phone] (toll-free)   Preferred Urgent Care     Preferred Hospital     Preferred Pharmacy [id number] - [place], [place] - [address]     Behavioral Health Crisis Line The National Suicide Prevention Lifeline at 1-505.148.3612 or 912             My Care Team Members  Patient Care Team       Relationship Specialty Notifications Start End    Jessica Flores MD PCP - General Family Practice  7/10/15     Phone: [phone] Fax: [phone]         [facility] [employer] [address] [address]    Jessica Flores MD Assigned PCP   6/12/15     Phone: [phone] Fax: [phone]         [address] [place] [address]    Farnaz Smith, RN Lead Care Coordinator Primary Care - CC Admissions 6/28/18     Phone: [phone] Fax: [phone]                My Care Plans  Self Management and Treatment Plan  Goals and (Comments)  Goals         General    Financial Wellbeing (pt-stated)     Notes - Note created  9/11/2018  3:31 PM by Farnaz Smith, RN    Goal 1  Statement: I will continue to pay for [employer] insurance through October when I will enroll on my boyfriend's insurance.   Measure of Success: completed.   Supportive Steps to Achieve:   Barriers:   Strengths:   Date to Achieve By: 11/1/18  Patient expressed understanding of goal: yes          Financial Wellbeing 1 (pt-stated)     Notes - Note created  2/28/2019  2:45 PM by Farnza Smith, RN    Goal 1  Statement: I will discuss SSD with Clinic Care Coordinator SW  Measure of Success: completed  Supportive Steps to Achieve: routed to Clinic Care Coordinator SW  Barriers: motivation   Strengths: need for income  Date to Achieve By: 4/1/19  Patient expressed understanding of goal: yes                 Action Plans on File:                       Advance Care Plans/Directives Type:        My Medical and Care Information  Problem List   Patient Active Problem List   Diagnosis     Lumbar radiculopathy     Ulcerative colitis without complications (H)     L4-L5 disc bulge     Anxiety state     Major depressive disorder, recurrent episode, moderate (H)     Hypertension goal BP (blood pressure) < 140/90     Migraine without aura and without status migrainosus, not intractable     Midline low back pain without sciatica     Orthopedic aftercare     Primary insomnia      Current Medications and Allergies:  See printed Medication Report.    Care Coordination Start Date: No linked episodes   Frequency of Care Coordination:     Form Last Updated: 05/07/2019

## 2019-05-13 ENCOUNTER — HOSPITAL ENCOUNTER (OUTPATIENT)
Dept: ULTRASOUND IMAGING | Facility: CLINIC | Age: 54
Discharge: HOME | End: 2019-05-13
Attending: INTERNAL MEDICINE | Admitting: INTERNAL MEDICINE
Payer: COMMERCIAL

## 2019-05-13 DIAGNOSIS — E04.1 THYROID NODULE: ICD-10-CM

## 2019-05-13 PROCEDURE — 76536 US EXAM OF HEAD AND NECK: CPT

## 2019-06-11 ENCOUNTER — PATIENT OUTREACH (OUTPATIENT)
Dept: CARE COORDINATION | Facility: CLINIC | Age: 54
End: 2019-06-11

## 2019-06-11 ASSESSMENT — ACTIVITIES OF DAILY LIVING (ADL): DEPENDENT_IADLS:: INDEPENDENT

## 2019-06-11 NOTE — PROGRESS NOTES
Clinic Care Coordination Contact  [facility]/Voicemail    Referral Source: PCP  Clinical Data: Care Coordinator Outreach  Outreach attempted .  Left message on voicemail with call back information and requested return call.  Plan:. Care Coordinator will try to reach patient again next month if no return call from patient.

## 2019-06-14 DIAGNOSIS — M51.369 L4-L5 DISC BULGE: ICD-10-CM

## 2019-06-14 NOTE — TELEPHONE ENCOUNTER
Zanaflex       Last Written Prescription Date:  04/25/2019  Last Fill Quantity: 160,   # refills: 1  Last Office Visit: 02/28/2019  Future Office visit:       Routing refill request to provider for review/approval because:  Drug not on the [facility] or [facility] refill protocol or controlled substance       Requested Prescriptions   Pending Prescriptions Disp Refills     tiZANidine (ZANAFLEX) 4 MG tablet [Pharmacy Med Name: TIZANIDINE HCL 4 MG TABLET] 160 tablet 1     Sig: TAKE 2 TABLETS BY MOUTH THREE TIMES A DAY       There is no refill protocol information for this order

## 2019-06-14 NOTE — TELEPHONE ENCOUNTER
Routing refill request to provider for review/approval because:  Drug not on the [facility] refill protocol   Ghazala CHU RN

## 2019-06-18 ENCOUNTER — TELEPHONE (OUTPATIENT)
Dept: FAMILY MEDICINE | Facility: CLINIC | Age: 54
End: 2019-06-18

## 2019-06-18 NOTE — TELEPHONE ENCOUNTER
Reason for call:  Other   Patient called regarding (reason for call): Pt requesting a call back from  about new medications for arachnoiditis which have proven neural regeneration.  Additional comments:     Phone number to reach patient:  Cell number on file:    Telephone Information:   Mobile [phone]       Best Time:  Anytime    Can we leave a detailed message on this number?  YES

## 2019-06-19 NOTE — TELEPHONE ENCOUNTER
LVM - call back to schedule phone visit (see TE from 6/18/19) and also let her know SN would like her to see MTM. Call back to schedule.    Ericka Damon RN

## 2019-06-19 NOTE — TELEPHONE ENCOUNTER
i'd like her to meet with out MTM pharmacist just to review her medications - these can start to have drug interactions with higher dose of meds and for her safety I'd like to review this with them    Sent in 1 month refill    Thanks  'SN

## 2019-06-20 ENCOUNTER — VIRTUAL VISIT (OUTPATIENT)
Dept: FAMILY MEDICINE | Facility: CLINIC | Age: 54
End: 2019-06-20
Payer: COMMERCIAL

## 2019-06-20 ENCOUNTER — E-VISIT (OUTPATIENT)
Dept: FAMILY MEDICINE | Facility: CLINIC | Age: 54
End: 2019-06-20

## 2019-06-20 DIAGNOSIS — M51.369 L4-L5 DISC BULGE: ICD-10-CM

## 2019-06-20 DIAGNOSIS — Z53.9 NO SHOW: Primary | ICD-10-CM

## 2019-06-20 PROCEDURE — 99207 ZZC NO BILLABLE SERVICE THIS VISIT: CPT | Performed by: FAMILY MEDICINE

## 2019-06-20 NOTE — PROGRESS NOTES
"Sima Ross is a 53 year old female who is being evaluated via a telephone visit.      The patient has been notified of following (by M.A) ***     \"We have found that certain health care needs can be provided without the need for a physical exam.  This service lets us provide the care you need with a short phone conversation.  If a prescription is necessary we can send it directly to your pharmacy.  If lab work is needed we can place an order for that and you can then stop by our lab to have the test done at a later time.    This telephone visit will be conducted via 3 way call with the you (the patient) , the physician/provider, and a me all on the line at the same time.  This allows your physician/provider to have the phone conversation with you while I will be taking notes for your medical record.  We will have full access to your [facility] medical record during this entire phone call.    Since this is like an office visit,  will bill your insurance company for this service.  Please check with your medical insurance if this type of telephone/virtual is covered . You may be responsible for the cost of this service if insurance coverage is denied.  The typical cost is $30 (10min), $59(11-20min) and $85 (21-30min)     If during the course of the call the physician/provider feels a telephone visit is not appropriate, you will not be charged for this service\"    Consent has been obtained for this service by care team member: {YES/NO:[id number]}.  See the scanned image in the medical record.    S: The history as provided by the patient to the provider during this 3 way call include ***    Pertinent parts of the the patient's medical history reviewed and confirmed by the provider included : ***     Total time of call between patient and provider was *** minutes     *** (MD signature)  ===================================================    I have reviewed the note as documented above.  This accurately captures the " substance of my conversation with the patient,    Additional provider notes:***    Assessment/Plan:  No diagnosis found.

## 2019-06-20 NOTE — TELEPHONE ENCOUNTER
"Patient called back   Noted in lync to triage she is \"fighting for her life\" regarding below  Told  SN wants phone visit   scheduled phone visit  Now pt not answering phone  Ghazala CHU RN    "

## 2019-06-21 ENCOUNTER — MYC MEDICAL ADVICE (OUTPATIENT)
Dept: FAMILY MEDICINE | Facility: CLINIC | Age: 54
End: 2019-06-21

## 2019-06-21 NOTE — TELEPHONE ENCOUNTER
Per previous [place], pt wanting:   Prednisone   Toradol gage   Ultram 100 mg qid pen    mg 3-5 days a week   Oxytocin 40 units pen     Not sure what she means by HCG and Oxytocin meds?  Advised visit  Ghazala CHU RN

## 2019-06-25 ENCOUNTER — OFFICE VISIT (OUTPATIENT)
Dept: FAMILY MEDICINE | Facility: CLINIC | Age: 54
End: 2019-06-25
Payer: COMMERCIAL

## 2019-06-25 VITALS
HEIGHT: 69 IN | HEART RATE: 78 BPM | BODY MASS INDEX: 26.22 KG/M2 | SYSTOLIC BLOOD PRESSURE: 138 MMHG | DIASTOLIC BLOOD PRESSURE: 79 MMHG | WEIGHT: 177 LBS | RESPIRATION RATE: 16 BRPM | OXYGEN SATURATION: 99 % | TEMPERATURE: 98.5 F

## 2019-06-25 DIAGNOSIS — I10 ESSENTIAL HYPERTENSION WITH GOAL BLOOD PRESSURE LESS THAN 140/90: ICD-10-CM

## 2019-06-25 DIAGNOSIS — G89.28 OTHER CHRONIC POSTPROCEDURAL PAIN: Primary | ICD-10-CM

## 2019-06-25 DIAGNOSIS — M54.16 LUMBAR RADICULOPATHY: ICD-10-CM

## 2019-06-25 DIAGNOSIS — F33.1 MAJOR DEPRESSIVE DISORDER, RECURRENT EPISODE, MODERATE (H): ICD-10-CM

## 2019-06-25 PROCEDURE — 99214 OFFICE O/P EST MOD 30 MIN: CPT | Performed by: FAMILY MEDICINE

## 2019-06-25 RX ORDER — GABAPENTIN 600 MG/1
600 TABLET ORAL 3 TIMES DAILY
Qty: 180 TABLET | Refills: 5 | Status: SHIPPED | OUTPATIENT
Start: 2019-06-25

## 2019-06-25 ASSESSMENT — MIFFLIN-ST. JEOR: SCORE: 1467.25

## 2019-06-25 NOTE — Clinical Note
Please review- she was offered all the help I believe she should get & she declined , & left clinic with a derogatory remark as noted in the chart as best as I remember.

## 2019-06-25 NOTE — PROGRESS NOTES
"Subjective     Sima Ross is a 53 year old female who presents to clinic today for the following health issues: patient of Jessica Flores  . I am seeing her for the first time   Patient left the clinic after 25 mins of consultation/exam  & demanding tordol ,yelling  \"you do not understand english, People like you are bogus & should go back to your country\".   HPI   Chief Complaint   Patient presents with     Consult     Please see Nurse JOANN from today   Patient presented with protocol for  arachnoiditis  complaining that \"no one is willing to treat her pain\"     \"I need Toradol, I need steroid\" here is the protocal , follow this  \"Dr Ozuna caused the arachnoiditis\"     she was very demanding from the  beginning & left the clinic storming out after 25 mins of consultation - details as following ,    She did bring her CDI imagining from 3 yrs ago  history of moderate right L4-L5 stenosis  2016- Dr Ozuna did surgery on l4-l5 -fusion    4/2017- CT-Lumbar spine - arachnoid adhesion affecting distal thecal sac L4-L5 & myelgram- arachnoid adhesive disease in distal thecal sac,    She reports pain lower body, buttock and past knees , burning sensation , falls, trouble with sex , leakage of urine while having intercourse-all these symptoms going on since 2016  &  worse since a month    She repeatedly voiced frustration that no one able to treat the pain &  she has sent multiple messages to her provider and finally frustrated so here to get tordal and steroid / protocol she found for arachnoditis- [facility]     She stated the Neurontin was decreased too much and  Requested to be  back on tizanidine,duloxetine and has been taking ibuprofen but no help      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 She dclined further evaluation , imaging, pain clinic, neurosurgeon and further help with PHYSICAL THERAPY     She reports pain  on superficial touch and as we discussed the need for further consultation- patient sprung off the exam table, while I was recording the plan of care  As stated above    and screamed  \"you do not understand english, People like you are bogus & should go back to your country\".       Patient Active Problem List   Diagnosis     Lumbar radiculopathy     Ulcerative colitis without complications (H)     L4-L5 disc bulge     Anxiety state     Major depressive disorder, recurrent episode, moderate (H)     Hypertension goal BP (blood pressure) < 140/90     Migraine without aura and without status migrainosus, not intractable     Midline low back pain without sciatica     Orthopedic aftercare     Primary insomnia     Past Surgical History:   Procedure Laterality Date     ANKLE SURGERY Right      BACK SURGERY  2009    LUMBAR DISCECTOMY L4-L5     BACK SURGERY  2016    LUMBAR FUSION     COLONOSCOPY       HYSTERECTOMY  7/2014     INSERT STIMULATOR AND LEADS INTERNAL DORSAL COLUMN N/A 11/21/2017    Procedure: INSERT STIMULATOR AND LEADS INTERNAL DORSAL COLUMN;  SPINAL CORD STIMULATOR IMPLANT ;  Surgeon: Geno Domínguez DO;  Location: [facility] OR     LAPAROSCOPIC HYSTERECTOMY TOTAL  2013       Social History     Tobacco Use     Smoking status: Former Smoker     Last attempt to quit: 11/15/2015     Years since quitting: 3.6     Smokeless tobacco: Never Used   Substance Use Topics     Alcohol use: Yes     Comment: 2x per month     Family History   Problem Relation Age of Onset     Hypertension Mother      Thyroid " "Disease Mother      Hypertension Maternal Grandmother      Hypertension Paternal Grandfather      Breast Cancer Paternal Grandmother      Breast Cancer Maternal Aunt            PROBLEMS TO ADD ON...  Reviewed and updated as needed this visit by Provider         Review of Systems  No fever. No recent injury.   ROS COMP: Constitutional, HEENT, cardiovascular, pulmonary, GI, , musculoskeletal, neuro, skin, endocrine and psych systems are negative, except as otherwise noted.      Objective    /79   Pulse 78   Temp 98.5  F (36.9  C) (Oral)   Resp 16   Ht 1.753 m (5' 9\")   Wt 80.3 kg (177 lb)   SpO2 99%   BMI 26.14 kg/m    Body mass index is 26.14 kg/m .  Physical Exam   GENERAL: healthy, alert and no distress. Angry, voiced frustration multiple times & Augmentative,   NECK: no adenopathy, no asymmetry, masses, or scars and thyroid normal to palpation  RESP: lungs clear to auscultation - no rales, rhonchi or wheezes  CV: regular rate and rhythm, normal S1 S2, no S3 or S4, no murmur, click or rub, no peripheral edema and peripheral pulses strong  ABDOMEN: soft, nontender, no hepatosplenomegaly, no masses and bowel sounds normal  MS: no gross musculoskeletal defects noted, no edema  NEURO: gait speech clear. gait normal   Normal strength and tone, mentation intact and speech normal  Pain on superficial touch on lower back  in general but none on distraction.  Diagnostic Test Results:  Labs reviewed in Epic        Assessment & Plan   1. Other chronic postprocedural pain  Assessment: 54 yo female demanding multiple weekly doses of tordal as standing order for her for on going pain to follow [facility] for arachinoditis, diagnosed  2017 post LS surgery & been managed at  pain clinic . She was under care of - last visit report is scanned in our records from  7'2018- was under care of  yeison Vargas & did not give permission to speak with  pain clinic further & voiced further frustration as need to change all " providers.    She did bring her CDI imagining from 2017, that I reviewed with the patient  history of moderate right L4-L5 stenosis  2016- Dr Ozuna did surgery on l4-l5 -fusion  4/2017- CT-Lumbar spine - arachnoid adhesion affecting distal thecal sac L4-L5 & myelgram- arachnoid adhesive disease in distal thecal sac,- these images report are not scanned in as patient took those back .      2. Lumbar radiculopathy vs arachnoiditis   Plan:  Had arachnoiditis after LS surgery 2017.  I acknowledged her  Pain and that I was able to review the protocol as scanned previously in  her records.  I also acknowledged on going suffering & agreed that a short course of steroid is appropriate use while we investigate further. While arachnoiditis definitely happened postoperatively but those imaging and investigation were completed in 2016 end, 2017.  I also explained that the need for tordal as NSAID , injectable multiple doses a week, should be further assessed at the least by blood test to make sure renal functions intact , and patient declined blood test.   She is offered pain clinic eval & that  she is best served at chronic pain clinic to discuss her current protocol & that's quite reasonable , escpt I do not have experitiese in managing chronic pain- as he pain is high specialized  & previously she has been given tordal at  pain clinic- last documented 07'2018  Again she needed reassurance about getting short course of steroid today   & she calmed down a bit but as I turned toward the computer to start typing the plan and request for MRI, pain management & if I can discuss her previous management at  pain clinic ,That there is no  indication for  tordal today- patient raised her voice and started accusing again that NO ONE IS TAKING CARE OF HER PAIN & that she does not have time to go around seeing more physician and she is here and, she is seen by physician & she has protocol and that should be followed &  she should  "get tordal because of excruciating pain          At that pont I  offered her emergency department care if pain or any symptoms of saddle numbness worsening-  the  patient literally sprung off the exam table from lying position  ,  and  Yelled   \"you do not understand english, People like you are bogus & should go back to your country\".       MNPMP completed and no concerns    3. Major depressive disorder, recurrent episode, moderate (H)      4. Essential hypertension with goal blood pressure less than 140/90      Will forward the visit to dyad lead- & clinic     Joy Og MD  [facility]      "

## 2019-06-25 NOTE — TELEPHONE ENCOUNTER
Rx in [place] for Neurontin- Faxed to [facility] pharmacy  Zahira [facility] Unit Coordinator

## 2019-06-25 NOTE — NURSING NOTE
"Chief Complaint   Patient presents with     Consult     Please see Nurse TE from today     /79   Pulse 78   Temp 98.5  F (36.9  C) (Oral)   Resp 16   Ht 1.753 m (5' 9\")   Wt 80.3 kg (177 lb)   SpO2 99%   BMI 26.14 kg/m   Estimated body mass index is 26.14 kg/m  as calculated from the following:    Height as of this encounter: 1.753 m (5' 9\").    Weight as of this encounter: 80.3 kg (177 lb).  Medication Reconciliation: complete        Health Maintenance Due Pending Provider Review:  Mammogram and Colonoscopy/FIT    Pt will schedule Mammogram and Colonoscopy appt.    Vidya Cope MA  [facility]  [phone]  "

## 2019-06-27 ENCOUNTER — MYC MEDICAL ADVICE (OUTPATIENT)
Dept: FAMILY MEDICINE | Facility: CLINIC | Age: 54
End: 2019-06-27

## 2019-06-27 ENCOUNTER — TELEPHONE (OUTPATIENT)
Dept: FAMILY MEDICINE | Facility: CLINIC | Age: 54
End: 2019-06-27

## 2019-06-27 NOTE — TELEPHONE ENCOUNTER
MTM referral from: [facility] visit (referral by provider)    MTM referral outreach attempt #2 on June 27, 2019 at 4:07 PM      Outcome: Patient not reachable after several attempts, will route to MTM Pharmacist/Provider as an FYI. Thank you for the referral.    Georgie Dykes, MTM Coordinator

## 2019-06-27 NOTE — LETTER
July 2, 2019      Sima Ross  [address]  [facility] [place] [address]        Dear Dr. Mark Gao has recommended you schedule a Medication Therapy Management (MTM) appointment. MTM is designed to help you get the most of out of your medicines.     During an MTM appointment a specially trained pharmacist will review all of your medicines, both prescription and over-the-counter. They will make sure your medicines are the best choice for you and are safe and convenient for you.  MTM pharmacists work together with you and your doctor to help you understand your medicines, solve any problems related to your medicines and help you get the best results from taking your medicines.     At [facility] we strongly believe in a team approach to health care. We want to help you understand your medicines and health conditions. To learn more about how you might benefit from MTM services, watch the patient video at [email or web address].     To make an appointment, please call the MTM scheduling line at [phone] (toll-free at [phone]).    We look forward to hearing from you!        Isela Ingram, Pharm.D., M.B.A., BCACP  MTM Pharmacist, [facility]  Pager: [phone]  Email: [email or web address]

## 2019-07-02 PROBLEM — G89.28 OTHER CHRONIC POSTPROCEDURAL PAIN: Status: ACTIVE | Noted: 2019-07-02

## 2019-07-09 DIAGNOSIS — M51.369 L4-L5 DISC BULGE: ICD-10-CM

## 2019-07-09 RX ORDER — GABAPENTIN 600 MG/1
TABLET ORAL
Start: 2019-07-09

## 2019-07-09 NOTE — TELEPHONE ENCOUNTER
Gabapentin 600MG       Last Written Prescription Date:  06/25/2019  Last Fill Quantity: 180,   # refills: 5  Last Office Visit: 06/20/2019 virtual visit   Future Office visit:       Routing refill request to provider for review/approval because:  Drug not on the [facility] or [facility] refill protocol or controlled substance  Requested Prescriptions   Pending Prescriptions Disp Refills     gabapentin (NEURONTIN) 600 MG tablet [Pharmacy Med Name: GABAPENTIN 600 MG TABLET] 270 tablet 0     Sig: TAKE 1 TABLET (600 MG) BY MOUTH 3 TIMES DAILY       There is no refill protocol information for this order

## 2019-07-18 NOTE — TELEPHONE ENCOUNTER
Addended by: SHILPI DORAN on: 7/18/2019 08:37 AM     Modules accepted: Orders     [facility]:  Was ordered 6/28/18 #90 R2 at [id number], please contact them for transfer.    Radha Flores RN

## 2019-07-23 ENCOUNTER — PATIENT OUTREACH (OUTPATIENT)
Dept: CARE COORDINATION | Facility: CLINIC | Age: 54
End: 2019-07-23

## 2019-07-30 ENCOUNTER — OFFICE VISIT - HEALTHEAST (OUTPATIENT)
Dept: INTERNAL MEDICINE | Facility: CLINIC | Age: 54
End: 2019-07-30

## 2019-07-30 DIAGNOSIS — Z12.31 VISIT FOR SCREENING MAMMOGRAM: ICD-10-CM

## 2019-07-30 DIAGNOSIS — G43.109 MIGRAINE WITH AURA AND WITHOUT STATUS MIGRAINOSUS, NOT INTRACTABLE: ICD-10-CM

## 2019-07-30 DIAGNOSIS — I10 BENIGN ESSENTIAL HYPERTENSION: ICD-10-CM

## 2019-07-30 DIAGNOSIS — G03.9 ARACHNOIDITIS: ICD-10-CM

## 2019-07-30 DIAGNOSIS — R41.3 MEMORY LOSS: ICD-10-CM

## 2019-07-30 DIAGNOSIS — Z13.220 SCREENING FOR HYPERLIPIDEMIA: ICD-10-CM

## 2019-07-30 DIAGNOSIS — G89.4 CHRONIC PAIN DISORDER: ICD-10-CM

## 2019-07-30 DIAGNOSIS — E04.1 THYROID NODULE: ICD-10-CM

## 2019-07-30 LAB
ALBUMIN SERPL-MCNC: 3.9 G/DL (ref 3.5–5)
ALP SERPL-CCNC: 66 U/L (ref 45–120)
ALT SERPL W P-5'-P-CCNC: <9 U/L (ref 0–45)
ANION GAP SERPL CALCULATED.3IONS-SCNC: 8 MMOL/L (ref 5–18)
AST SERPL W P-5'-P-CCNC: 14 U/L (ref 0–40)
BILIRUB SERPL-MCNC: 0.3 MG/DL (ref 0–1)
BUN SERPL-MCNC: 23 MG/DL (ref 8–22)
C REACTIVE PROTEIN LHE: 0.2 MG/DL (ref 0–0.8)
CALCIUM SERPL-MCNC: 9.6 MG/DL (ref 8.5–10.5)
CHLORIDE BLD-SCNC: 109 MMOL/L (ref 98–107)
CHOLEST SERPL-MCNC: 255 MG/DL
CO2 SERPL-SCNC: 26 MMOL/L (ref 22–31)
CREAT SERPL-MCNC: 0.8 MG/DL (ref 0.6–1.1)
ERYTHROCYTE [DISTWIDTH] IN BLOOD BY AUTOMATED COUNT: 11.6 % (ref 11–14.5)
ERYTHROCYTE [SEDIMENTATION RATE] IN BLOOD BY WESTERGREN METHOD: 2 MM/HR (ref 0–20)
FASTING STATUS PATIENT QL REPORTED: YES
GFR SERPL CREATININE-BSD FRML MDRD: >60 ML/MIN/1.73M2
GLUCOSE BLD-MCNC: 110 MG/DL (ref 70–125)
HCT VFR BLD AUTO: 41.7 % (ref 35–47)
HDLC SERPL-MCNC: 64 MG/DL
HGB BLD-MCNC: 14.1 G/DL (ref 12–16)
LDLC SERPL CALC-MCNC: 177 MG/DL
MCH RBC QN AUTO: 30.4 PG (ref 27–34)
MCHC RBC AUTO-ENTMCNC: 33.8 G/DL (ref 32–36)
MCV RBC AUTO: 90 FL (ref 80–100)
PLATELET # BLD AUTO: 238 THOU/UL (ref 140–440)
PMV BLD AUTO: 7.6 FL (ref 7–10)
POTASSIUM BLD-SCNC: 3.9 MMOL/L (ref 3.5–5)
PROT SERPL-MCNC: 6.1 G/DL (ref 6–8)
RBC # BLD AUTO: 4.63 MILL/UL (ref 3.8–5.4)
SODIUM SERPL-SCNC: 143 MMOL/L (ref 136–145)
TRIGL SERPL-MCNC: 68 MG/DL
TSH SERPL DL<=0.005 MIU/L-ACNC: 0.3 UIU/ML (ref 0.3–5)
VIT B12 SERPL-MCNC: 248 PG/ML (ref 213–816)
WBC: 5.7 THOU/UL (ref 4–11)

## 2019-07-30 ASSESSMENT — MIFFLIN-ST. JEOR: SCORE: 1495.8

## 2019-07-31 ENCOUNTER — COMMUNICATION - HEALTHEAST (OUTPATIENT)
Dept: INTERNAL MEDICINE | Facility: CLINIC | Age: 54
End: 2019-07-31

## 2019-08-02 ENCOUNTER — COMMUNICATION - HEALTHEAST (OUTPATIENT)
Dept: INTERNAL MEDICINE | Facility: CLINIC | Age: 54
End: 2019-08-02

## 2019-08-30 ENCOUNTER — RECORDS - HEALTHEAST (OUTPATIENT)
Dept: ADMINISTRATIVE | Facility: OTHER | Age: 54
End: 2019-08-30

## 2019-08-30 ENCOUNTER — HOSPITAL ENCOUNTER (OUTPATIENT)
Dept: PALLIATIVE MEDICINE | Facility: OTHER | Age: 54
Discharge: HOME | End: 2019-08-30
Attending: PHYSICIAN ASSISTANT

## 2019-08-30 DIAGNOSIS — G89.4 CHRONIC PAIN DISORDER: ICD-10-CM

## 2019-08-30 DIAGNOSIS — G03.9 ARACHNOIDITIS: ICD-10-CM

## 2019-08-30 DIAGNOSIS — R41.3 MEMORY LOSS: ICD-10-CM

## 2019-08-30 DIAGNOSIS — Z79.899 MEDICAL CANNABIS USE: ICD-10-CM

## 2019-08-30 ASSESSMENT — MIFFLIN-ST. JEOR: SCORE: 1443.64

## 2019-09-01 ENCOUNTER — RECORDS - HEALTHEAST (OUTPATIENT)
Dept: ADMINISTRATIVE | Facility: OTHER | Age: 54
End: 2019-09-01

## 2019-09-02 LAB
6MAM UR QL: NOT DETECTED
7AMINOCLONAZEPAM UR QL: NOT DETECTED
A-OH ALPRAZ UR QL: NOT DETECTED
ALPRAZ UR QL: NOT DETECTED
AMPHET UR QL SCN: NOT DETECTED
BARBITURATES UR QL: NOT DETECTED
BUPRENORPHINE UR QL: NOT DETECTED
BZE UR QL: NOT DETECTED
CARBOXYTHC UR QL: PRESENT
CARISOPRODOL UR QL: NOT DETECTED
CLONAZEPAM UR QL: NOT DETECTED
CODEINE UR QL: NOT DETECTED
CREAT UR-MCNC: 44.6 MG/DL (ref 20–400)
DIAZEPAM UR QL: NOT DETECTED
ETHYL GLUCURONIDE UR QL: NOT DETECTED
FENTANYL UR QL: NOT DETECTED
HYDROCODONE UR QL: NOT DETECTED
HYDROMORPHONE UR QL: NOT DETECTED
LORAZEPAM UR QL: NOT DETECTED
MDA UR QL: NOT DETECTED
MDEA UR QL: NOT DETECTED
MDMA UR QL: NOT DETECTED
ME-PHENIDATE UR QL: NOT DETECTED
MEPERIDINE UR QL: NOT DETECTED
METHADONE UR QL: NOT DETECTED
METHAMPHET UR QL: NOT DETECTED
MIDAZOLAM UR QL SCN: NOT DETECTED
MORPHINE UR QL: NOT DETECTED
NORBUPRENORPHINE UR QL CFM: NOT DETECTED
NORDIAZEPAM UR QL: NOT DETECTED
NORFENTANYL UR QL: NOT DETECTED
NORHYDROCODONE UR QL CFM: NOT DETECTED
NOROXYCODONE UR QL CFM: NOT DETECTED
NOROXYMORPHONE UR QL SCN: NOT DETECTED
OXAZEPAM UR QL: NOT DETECTED
OXYCODONE UR QL: NOT DETECTED
OXYMORPHONE UR QL: NOT DETECTED
PATHOLOGY STUDY: NORMAL
PCP UR QL: NOT DETECTED
PHENTERMINE UR QL: NOT DETECTED
PROPOXYPH UR QL: NOT DETECTED
SERVICE CMNT-IMP: NORMAL
TAPENTADOL UR QL SCN: NOT DETECTED
TAPENTADOL UR QL SCN: NOT DETECTED
TEMAZEPAM UR QL: NOT DETECTED
TRAMADOL UR QL: NOT DETECTED
ZOLPIDEM UR QL: NOT DETECTED

## 2019-09-06 DIAGNOSIS — M54.16 LUMBAR RADICULOPATHY: ICD-10-CM

## 2019-09-06 DIAGNOSIS — M51.369 L4-L5 DISC BULGE: ICD-10-CM

## 2019-09-06 NOTE — TELEPHONE ENCOUNTER
"Bentyl 20MG  Last Written Prescription Date:  06/28/2018  Last Fill Quantity: 360,  # refills: 3   Last office visit: 6/25/2019 with prescribing provider:  Monica, last office visit with prescribing provider was on 06/20/2019- e-visit    Future Office Visit:      Requested Prescriptions   Pending Prescriptions Disp Refills     dicyclomine (BENTYL) 20 MG tablet [Pharmacy Med Name: DICYCLOMINE 20 MG TABLET] 360 tablet 3     Sig: TAKE 1 TABLET (20 MG) BY MOUTH EVERY 6 HOURS       Oral Anticholinergic Agents Passed - 9/6/2019  1:10 AM        Passed - Patient is of age 12 or older        Passed - Recent (12 mo) or future (30 days) visit with authorizing provider's specialty     Patient had office visit in the last 12 months or has a visit in the next 30 days with authorizing provider or within the authorizing provider's specialty.  See \"Patient Info\" tab in inbasket, or \"Choose Columns\" in Meds & Orders section of the refill encounter.              Passed - Medication is active on med list        Passed - Patient is not pregnant        Passed - No positive pregnancy test on file within past 12 months          "

## 2019-09-06 NOTE — TELEPHONE ENCOUNTER
Routing refill request to provider for review/approval because:  A break in medication- last rx would have been completed 7/28/19 per med list.  Please authorize if appropriate.  Thanks,  Radha Flores RN

## 2019-09-07 RX ORDER — DICYCLOMINE HCL 20 MG
20 TABLET ORAL EVERY 6 HOURS
Qty: 360 TABLET | Refills: 3 | Status: SHIPPED | OUTPATIENT
Start: 2019-09-07

## 2019-09-24 ENCOUNTER — RECORDS - HEALTHEAST (OUTPATIENT)
Dept: ADMINISTRATIVE | Facility: OTHER | Age: 54
End: 2019-09-24

## 2019-09-24 ENCOUNTER — PATIENT OUTREACH (OUTPATIENT)
Dept: CARE COORDINATION | Facility: CLINIC | Age: 54
End: 2019-09-24

## 2019-09-24 NOTE — PROGRESS NOTES
Clinic Care Coordination Contact  Care Team Conversations    Patient changed  PCP clinic.   Patient closed to clinic care coordination

## 2019-10-01 ENCOUNTER — HOSPITAL ENCOUNTER (OUTPATIENT)
Dept: PALLIATIVE MEDICINE | Facility: OTHER | Age: 54
Discharge: HOME | End: 2019-10-01
Attending: PHYSICIAN ASSISTANT

## 2019-10-01 DIAGNOSIS — G89.4 CHRONIC PAIN SYNDROME: ICD-10-CM

## 2019-10-01 DIAGNOSIS — F11.90 CHRONIC, CONTINUOUS USE OF OPIOIDS: ICD-10-CM

## 2019-10-01 DIAGNOSIS — G89.4 CHRONIC PAIN DISORDER: ICD-10-CM

## 2019-10-01 DIAGNOSIS — Z79.899 MEDICAL CANNABIS USE: ICD-10-CM

## 2019-10-01 DIAGNOSIS — G03.9 ARACHNOIDITIS: ICD-10-CM

## 2019-10-01 ASSESSMENT — MIFFLIN-ST. JEOR: SCORE: 1443.64

## 2019-10-27 ENCOUNTER — COMMUNICATION - HEALTHEAST (OUTPATIENT)
Dept: PALLIATIVE MEDICINE | Facility: OTHER | Age: 54
End: 2019-10-27

## 2019-10-27 DIAGNOSIS — G89.4 CHRONIC PAIN DISORDER: ICD-10-CM

## 2019-11-05 ENCOUNTER — COMMUNICATION - HEALTHEAST (OUTPATIENT)
Dept: PALLIATIVE MEDICINE | Facility: OTHER | Age: 54
End: 2019-11-05

## 2019-11-05 DIAGNOSIS — G89.4 CHRONIC PAIN SYNDROME: ICD-10-CM

## 2019-11-13 ENCOUNTER — COMMUNICATION - HEALTHEAST (OUTPATIENT)
Dept: PALLIATIVE MEDICINE | Facility: OTHER | Age: 54
End: 2019-11-13

## 2019-11-18 ENCOUNTER — TELEPHONE (OUTPATIENT)
Dept: FAMILY MEDICINE | Facility: CLINIC | Age: 54
End: 2019-11-18

## 2019-11-18 NOTE — TELEPHONE ENCOUNTER
11/18/2019    Call Regarding Preventive Health Screening Mammogram       Attempt 1    Message on voicemail      Outreach   MELODIE

## 2019-11-24 ENCOUNTER — COMMUNICATION - HEALTHEAST (OUTPATIENT)
Dept: PALLIATIVE MEDICINE | Facility: OTHER | Age: 54
End: 2019-11-24

## 2019-11-24 DIAGNOSIS — G03.9 ARACHNOIDITIS: ICD-10-CM

## 2019-11-24 DIAGNOSIS — G89.4 CHRONIC PAIN DISORDER: ICD-10-CM

## 2019-11-25 ENCOUNTER — COMMUNICATION - HEALTHEAST (OUTPATIENT)
Dept: PALLIATIVE MEDICINE | Facility: OTHER | Age: 54
End: 2019-11-25

## 2019-11-26 ENCOUNTER — COMMUNICATION - HEALTHEAST (OUTPATIENT)
Dept: PALLIATIVE MEDICINE | Facility: OTHER | Age: 54
End: 2019-11-26

## 2019-11-26 DIAGNOSIS — G89.4 CHRONIC PAIN SYNDROME: ICD-10-CM

## 2019-11-27 ENCOUNTER — RECORDS - HEALTHEAST (OUTPATIENT)
Dept: ADMINISTRATIVE | Facility: OTHER | Age: 54
End: 2019-11-27

## 2019-12-05 NOTE — TELEPHONE ENCOUNTER
12/5/2019    Call Regarding Preventive Health Screening Mammogram       Attempt 2    Message on voicemail    Comments: ALSO DUE FOR PHYSICAL      Outreach   DANILO

## 2019-12-11 ENCOUNTER — AMBULATORY - HEALTHEAST (OUTPATIENT)
Dept: PALLIATIVE MEDICINE | Facility: OTHER | Age: 54
End: 2019-12-11

## 2019-12-11 ENCOUNTER — HOSPITAL ENCOUNTER (OUTPATIENT)
Dept: PALLIATIVE MEDICINE | Facility: OTHER | Age: 54
Discharge: HOME | End: 2019-12-11
Attending: PAIN MEDICINE

## 2019-12-11 DIAGNOSIS — G03.9 ARACHNOIDITIS: ICD-10-CM

## 2019-12-11 DIAGNOSIS — M54.16 LUMBAR RADICULOPATHY: ICD-10-CM

## 2019-12-11 ASSESSMENT — MIFFLIN-ST. JEOR: SCORE: 1443.64

## 2019-12-11 NOTE — TELEPHONE ENCOUNTER
12/11/2019    Call Regarding Preventive Health Screening Mammogram and ReattributionPhysical       Attempt 3    Message on voicemail      Outreach   KT

## 2019-12-22 ENCOUNTER — COMMUNICATION - HEALTHEAST (OUTPATIENT)
Dept: PALLIATIVE MEDICINE | Facility: OTHER | Age: 54
End: 2019-12-22

## 2019-12-22 DIAGNOSIS — G89.4 CHRONIC PAIN DISORDER: ICD-10-CM

## 2020-01-15 ENCOUNTER — HOSPITAL ENCOUNTER (OUTPATIENT)
Dept: PALLIATIVE MEDICINE | Facility: OTHER | Age: 55
Discharge: HOME | End: 2020-01-15
Attending: PHYSICIAN ASSISTANT

## 2020-01-15 ENCOUNTER — COMMUNICATION - HEALTHEAST (OUTPATIENT)
Dept: PALLIATIVE MEDICINE | Facility: OTHER | Age: 55
End: 2020-01-15

## 2020-01-15 DIAGNOSIS — M54.16 LUMBAR RADICULOPATHY: ICD-10-CM

## 2020-01-15 DIAGNOSIS — F90.0 ADHD (ATTENTION DEFICIT HYPERACTIVITY DISORDER), INATTENTIVE TYPE: ICD-10-CM

## 2020-01-15 DIAGNOSIS — G03.9 ARACHNOIDITIS: ICD-10-CM

## 2020-01-15 DIAGNOSIS — F11.90 CHRONIC, CONTINUOUS USE OF OPIOIDS: ICD-10-CM

## 2020-01-15 DIAGNOSIS — Z79.899 MEDICAL CANNABIS USE: ICD-10-CM

## 2020-01-15 DIAGNOSIS — G89.4 CHRONIC PAIN SYNDROME: ICD-10-CM

## 2020-01-15 ASSESSMENT — MIFFLIN-ST. JEOR: SCORE: 1481.74

## 2020-01-27 ENCOUNTER — COMMUNICATION - HEALTHEAST (OUTPATIENT)
Dept: PALLIATIVE MEDICINE | Facility: OTHER | Age: 55
End: 2020-01-27

## 2020-01-27 DIAGNOSIS — G89.4 CHRONIC PAIN SYNDROME: ICD-10-CM

## 2020-03-10 ENCOUNTER — HEALTH MAINTENANCE LETTER (OUTPATIENT)
Age: 55
End: 2020-03-10

## 2020-03-11 ENCOUNTER — HOSPITAL ENCOUNTER (OUTPATIENT)
Dept: PALLIATIVE MEDICINE | Facility: OTHER | Age: 55
Discharge: HOME | End: 2020-03-11
Attending: PHYSICIAN ASSISTANT

## 2020-03-11 DIAGNOSIS — G89.4 CHRONIC PAIN SYNDROME: ICD-10-CM

## 2020-03-11 DIAGNOSIS — F11.90 CHRONIC, CONTINUOUS USE OF OPIOIDS: ICD-10-CM

## 2020-03-11 DIAGNOSIS — G03.9 ARACHNOIDITIS: ICD-10-CM

## 2020-03-11 LAB
ALBUMIN SERPL-MCNC: 4.4 G/DL (ref 3.5–5)
ANION GAP SERPL CALCULATED.3IONS-SCNC: 6 MMOL/L (ref 5–18)
BUN SERPL-MCNC: 17 MG/DL (ref 8–22)
CALCIUM SERPL-MCNC: 9.8 MG/DL (ref 8.5–10.5)
CHLORIDE BLD-SCNC: 105 MMOL/L (ref 98–107)
CO2 SERPL-SCNC: 26 MMOL/L (ref 22–31)
CREAT SERPL-MCNC: 0.9 MG/DL (ref 0.6–1.1)
GFR SERPL CREATININE-BSD FRML MDRD: >60 ML/MIN/1.73M2
GLUCOSE BLD-MCNC: 95 MG/DL (ref 70–125)
PHOSPHATE SERPL-MCNC: 3.8 MG/DL (ref 2.5–4.5)
POTASSIUM BLD-SCNC: 6.4 MMOL/L (ref 3.5–5)
SODIUM SERPL-SCNC: 137 MMOL/L (ref 136–145)

## 2020-03-11 ASSESSMENT — MIFFLIN-ST. JEOR: SCORE: 1425.49

## 2020-03-12 ENCOUNTER — COMMUNICATION - HEALTHEAST (OUTPATIENT)
Dept: PALLIATIVE MEDICINE | Facility: OTHER | Age: 55
End: 2020-03-12

## 2020-03-12 ENCOUNTER — AMBULATORY - HEALTHEAST (OUTPATIENT)
Dept: PALLIATIVE MEDICINE | Facility: OTHER | Age: 55
End: 2020-03-12

## 2020-03-12 DIAGNOSIS — E87.5 HYPERKALEMIA: ICD-10-CM

## 2020-04-07 ENCOUNTER — MYC MEDICAL ADVICE (OUTPATIENT)
Dept: FAMILY MEDICINE | Facility: CLINIC | Age: 55
End: 2020-04-07

## 2020-04-07 DIAGNOSIS — R05.9 COUGH: ICD-10-CM

## 2020-04-07 NOTE — TELEPHONE ENCOUNTER
"VENTOLIN HFA 90 MCG INHALER   Last Written Prescription Date:  01/30/2019  Last Fill Quantity: 18 INHALER,  # refills: 0   Last office visit: 6/25/2019 with prescribing provider:  AS   Future Office Visit:  Nothing at this time scheduled.    Requested Prescriptions   Pending Prescriptions Disp Refills     VENTOLIN  (90 Base) MCG/ACT inhaler [Pharmacy Med Name: VENTOLIN HFA 90 MCG INHALER] 18 Inhaler 0     Sig: INHALE 2 PUFFS BY MOUTH EVERY 6 HOURS AS NEEDED FOR WHEEZE OR FOR SHORTNESS OF BREATH       Asthma Maintenance Inhalers - Anticholinergics Passed - 4/7/2020 10:33 AM        Passed - Patient is age 12 years or older        Passed - Recent (12 mo) or future (30 days) visit within the authorizing provider's specialty     Patient has had an office visit with the authorizing provider or a provider within the authorizing providers department within the previous 12 mos or has a future within next 30 days. See \"Patient Info\" tab in inbasket, or \"Choose Columns\" in Meds & Orders section of the refill encounter.              Passed - Medication is active on med list       Short-Acting Beta Agonist Inhalers Protocol  Passed - 4/7/2020 10:33 AM        Passed - Patient is age 12 or older        Passed - Recent (12 mo) or future (30 days) visit within the authorizing provider's specialty     Patient has had an office visit with the authorizing provider or a provider within the authorizing providers department within the previous 12 mos or has a future within next 30 days. See \"Patient Info\" tab in inbasket, or \"Choose Columns\" in Meds & Orders section of the refill encounter.              Passed - Medication is active on med list           "

## 2020-04-07 NOTE — TELEPHONE ENCOUNTER
Was given 1/30/19 for cough.  No Asthma on problem list    Mychart message sent to patient.  Gayle Slater RN

## 2020-04-08 RX ORDER — ALBUTEROL SULFATE 90 UG/1
2 AEROSOL, METERED RESPIRATORY (INHALATION) EVERY 6 HOURS PRN
Qty: 1 INHALER | Refills: 3 | Status: SHIPPED | OUTPATIENT
Start: 2020-04-08

## 2020-04-09 RX ORDER — ALBUTEROL SULFATE 90 UG/1
AEROSOL, METERED RESPIRATORY (INHALATION)
Start: 2020-04-09

## 2020-05-02 ENCOUNTER — COMMUNICATION - HEALTHEAST (OUTPATIENT)
Dept: PALLIATIVE MEDICINE | Facility: OTHER | Age: 55
End: 2020-05-02

## 2020-05-02 DIAGNOSIS — G89.4 CHRONIC PAIN SYNDROME: ICD-10-CM

## 2020-05-06 ENCOUNTER — HOSPITAL ENCOUNTER (OUTPATIENT)
Dept: PALLIATIVE MEDICINE | Facility: OTHER | Age: 55
Discharge: HOME | End: 2020-05-06
Attending: PHYSICIAN ASSISTANT

## 2020-05-06 DIAGNOSIS — Z79.899 MEDICAL CANNABIS USE: ICD-10-CM

## 2020-05-06 DIAGNOSIS — G03.9 ARACHNOIDITIS: ICD-10-CM

## 2020-05-06 DIAGNOSIS — G89.4 CHRONIC PAIN SYNDROME: ICD-10-CM

## 2020-05-06 DIAGNOSIS — F11.90 CHRONIC, CONTINUOUS USE OF OPIOIDS: ICD-10-CM

## 2020-06-09 ENCOUNTER — COMMUNICATION - HEALTHEAST (OUTPATIENT)
Dept: PALLIATIVE MEDICINE | Facility: OTHER | Age: 55
End: 2020-06-09

## 2020-06-09 DIAGNOSIS — G89.4 CHRONIC PAIN SYNDROME: ICD-10-CM

## 2020-07-01 ENCOUNTER — COMMUNICATION - HEALTHEAST (OUTPATIENT)
Dept: PALLIATIVE MEDICINE | Facility: OTHER | Age: 55
End: 2020-07-01

## 2020-07-01 DIAGNOSIS — G89.4 CHRONIC PAIN SYNDROME: ICD-10-CM

## 2020-07-05 ENCOUNTER — COMMUNICATION - HEALTHEAST (OUTPATIENT)
Dept: PALLIATIVE MEDICINE | Facility: OTHER | Age: 55
End: 2020-07-05

## 2020-07-05 DIAGNOSIS — G03.9 ARACHNOIDITIS: ICD-10-CM

## 2020-07-06 ENCOUNTER — COMMUNICATION - HEALTHEAST (OUTPATIENT)
Dept: PALLIATIVE MEDICINE | Facility: OTHER | Age: 55
End: 2020-07-06

## 2020-07-06 DIAGNOSIS — G89.4 CHRONIC PAIN SYNDROME: ICD-10-CM

## 2020-07-14 ENCOUNTER — COMMUNICATION - HEALTHEAST (OUTPATIENT)
Dept: INTERNAL MEDICINE | Facility: CLINIC | Age: 55
End: 2020-07-14

## 2020-07-14 DIAGNOSIS — G43.109 MIGRAINE WITH AURA AND WITHOUT STATUS MIGRAINOSUS, NOT INTRACTABLE: ICD-10-CM

## 2020-07-16 ENCOUNTER — COMMUNICATION - HEALTHEAST (OUTPATIENT)
Dept: PALLIATIVE MEDICINE | Facility: OTHER | Age: 55
End: 2020-07-16

## 2020-07-16 DIAGNOSIS — G89.4 CHRONIC PAIN SYNDROME: ICD-10-CM

## 2020-07-21 ENCOUNTER — COMMUNICATION - HEALTHEAST (OUTPATIENT)
Dept: CARE COORDINATION | Facility: CLINIC | Age: 55
End: 2020-07-21

## 2020-07-21 ENCOUNTER — PATIENT OUTREACH (OUTPATIENT)
Dept: CARE COORDINATION | Facility: CLINIC | Age: 55
End: 2020-07-21

## 2020-07-21 DIAGNOSIS — M54.16 LUMBAR RADICULOPATHY: ICD-10-CM

## 2020-07-21 DIAGNOSIS — G03.9 ARACHNOIDITIS: ICD-10-CM

## 2020-07-21 DIAGNOSIS — G89.4 CHRONIC PAIN DISORDER: ICD-10-CM

## 2020-07-21 NOTE — PROGRESS NOTES
Clinical Product Navigator RN reviewed chart; patient on payer product coverage.  Review results: Met referral criteria for Care Coordinator; referral to be sent to patient's current PCP team in [facility].    Merlyn Overton RN/Clinical Product Navigator

## 2020-07-22 ENCOUNTER — COMMUNICATION - HEALTHEAST (OUTPATIENT)
Dept: NURSING | Facility: CLINIC | Age: 55
End: 2020-07-22

## 2020-07-28 ENCOUNTER — COMMUNICATION - HEALTHEAST (OUTPATIENT)
Dept: PALLIATIVE MEDICINE | Facility: OTHER | Age: 55
End: 2020-07-28

## 2020-07-28 DIAGNOSIS — G89.4 CHRONIC PAIN SYNDROME: ICD-10-CM

## 2020-08-03 ENCOUNTER — COMMUNICATION - HEALTHEAST (OUTPATIENT)
Dept: CARE COORDINATION | Facility: CLINIC | Age: 55
End: 2020-08-03

## 2020-08-13 ENCOUNTER — COMMUNICATION - HEALTHEAST (OUTPATIENT)
Dept: INTERNAL MEDICINE | Facility: CLINIC | Age: 55
End: 2020-08-13

## 2020-08-13 DIAGNOSIS — G89.4 CHRONIC PAIN DISORDER: ICD-10-CM

## 2020-08-13 DIAGNOSIS — G03.9 ARACHNOIDITIS: ICD-10-CM

## 2020-10-19 ENCOUNTER — COMMUNICATION - HEALTHEAST (OUTPATIENT)
Dept: INTERNAL MEDICINE | Facility: CLINIC | Age: 55
End: 2020-10-19

## 2020-10-19 DIAGNOSIS — G89.4 CHRONIC PAIN DISORDER: ICD-10-CM

## 2020-10-20 ENCOUNTER — COMMUNICATION - HEALTHEAST (OUTPATIENT)
Dept: INTERNAL MEDICINE | Facility: CLINIC | Age: 55
End: 2020-10-20

## 2020-10-20 DIAGNOSIS — I10 BENIGN ESSENTIAL HYPERTENSION: ICD-10-CM

## 2020-11-06 ENCOUNTER — COMMUNICATION - HEALTHEAST (OUTPATIENT)
Dept: INTERNAL MEDICINE | Facility: CLINIC | Age: 55
End: 2020-11-06

## 2020-11-08 NOTE — PATIENT INSTRUCTIONS
We discussed following choices that you have declined  referral to spine  Specialist,   Pain management clinic to discuss other pain management. modalities   reevaluation  With imaging/ MRI &  Spine surgeon   
COVID-19 testing are currently being prioritized at [facility] for admitted patients.     All patients that are stable are being discharged from the ED, even if there is a concern for coronavirus. Since you are stable, you are being discharged. Your test results may take 1-2 days. You will get a text message or email with results. Please check the patient online portal for results. Please follow the instructions on provided coronavirus discharge educational forms and self quarantine for 14 days.     In addition, you have been placed on our surveillance tracker. Return to the ED immediately if you have shortness of breath, fever, pain, weakness, vomiting any concerns.

## 2020-12-27 ENCOUNTER — HEALTH MAINTENANCE LETTER (OUTPATIENT)
Age: 55
End: 2020-12-27

## 2021-04-24 ENCOUNTER — HEALTH MAINTENANCE LETTER (OUTPATIENT)
Age: 56
End: 2021-04-24

## 2021-05-30 NOTE — PROGRESS NOTES
Please call and let her know that her labs found normal thyroid level, CBC, kidney function and liver function.  Markers of inflammation were normal.  Her B12 level is normal but on the low end of normal so she may want to add a B12 supplement daily.  Her lipids are high with an LDL of 177 and her glucose is borderline at 110.  We'll need to monitor this over time and can discuss at her follow up in more detail.  Thanks.

## 2021-05-30 NOTE — PROGRESS NOTES
Office Visit   Sima Ross   54 y.o. female    Date of Visit: 7/30/2019    Chief Complaint   Patient presents with     Establish care visit     Discuss goiter and arachnoidititis        Assessment and Plan   1. Thyroid nodule  She recently had a thyroid ultrasound and does need a biopsy.  I am going to refer her to endocrine to evaluate this further.  We will also do a thyroid function cascade today.  - Thyroid [place]  - Ambulatory referral to Endocrinology    2. Arachnoiditis  She has had ongoing pain and symptoms from arachnoiditis which occurred after spinal surgery in 2016.  She would like a new referral to a pain clinic and I am going to set that up.  She has been taking gabapentin, muscle relaxant, and Cymbalta.  She is also on medical marijuana.  On top of the chronic pain she feels that she has memory trouble because of the arachnoiditis.  I am going to recheck blood work and refer her to neurology to evaluate this further.  She is in agreement with this plan.  - Ambulatory referral to Pain Clinic  - Ambulatory referral to Neurology  - Erythrocyte Sedimentation Rate  - C-Reactive Protein(CRP)  - gabapentin (NEURONTIN) 600 MG tablet; Take 1.5 tablets (900 mg total) by mouth 3 (three) times a day.  Dispense: 270 tablet; Refill: 11    3. Chronic pain disorder  As above will refer her to the pain clinic.  I have prescribed her gabapentin and Zanaflex but only the Zanaflex for 1 month they do not think it is a good long-term option.  She will discuss this with the pain specialist.  We will also recheck some blood work today.  - HM2(CBC w/o Differential)  - Ambulatory referral to Pain Clinic  - Erythrocyte Sedimentation Rate  - C-Reactive Protein(CRP)  - gabapentin (NEURONTIN) 600 MG tablet; Take 1.5 tablets (900 mg total) by mouth 3 (three) times a day.  Dispense: 270 tablet; Refill: 11  - traZODone (DESYREL) 100 MG tablet; Take 1 tablet (100 mg total) by mouth at bedtime.  Dispense: 90 tablet; Refill:  3  - tiZANidine (ZANAFLEX) 4 MG tablet; Take 1 tablet (4 mg total) by mouth every 8 (eight) hours as needed.  Dispense: 90 tablet; Refill: 0  - ibuprofen (ADVIL,MOTRIN) 800 MG tablet; Take 1 tablet (800 mg total) by mouth every 8 (eight) hours as needed for pain.  Dispense: 90 tablet; Refill: 3    4. Memory loss  We discussed that this could certainly be due to her medications.  With a history of arachnoiditis I think that seeing neurology would be beneficial.  She is in agreement.  We will also do blood work including a B12 level.  - Vitamin B12  - Ambulatory referral to Pain Clinic  - Ambulatory referral to Neurology    5. Benign essential hypertension  Is a little borderline today.  I did renew her medications.  We will check her metabolic panel.  She will keep an eye on her blood pressure.  I will plan to see her back in 2 months to reassess.  - Comprehensive Metabolic Panel  - telmisartan-hydrochlorothiazide (MICARDIS HCT) 40-12.5 mg per tablet; Take 1 tablet by mouth daily.  Dispense: 90 tablet; Refill: 3    6. Migraine with aura and without status migrainosus, not intractable  She has good relief with Maxalt.  - rizatriptan (MAXALT) 10 MG tablet; Take 1 tablet (10 mg total) by mouth as needed for migraine.  Dispense: 15 tablet; Refill: 3    7. Visit for screening mammogram  - Mammo Screening Bilateral; Future    8. Screening for hyperlipidemia  - Lipid Cascade       Return in about 2 months (around 9/30/2019) for Recheck - 40 minutes.     History of Present Illness   This 54 y.o. old female comes in to establish care and discuss concerns.  She has a history of arachnoiditis that developed in 2016.  Since then she has had significant chronic pain.  In the past year she has been able to wean off opioid pain medications and is now on medical marijuana.  She also takes gabapentin and Zanaflex.  She is on Cymbalta.  She is new to me and is interested in switching her primary care.  She would also like a new  referral to a pain clinic to manage her chronic pain.  We will set that up for her.  She also feels that she has difficulties with her memory.  States she is in a fog most the time.  We discussed that it could be due to her medications.  She would like to see neurology to evaluate this and I think that would be beneficial.  Finally she has a newly diagnosed thyroid nodule that needs a biopsy.  I am going to refer her to endocrine.  She has not had recent blood work that I can see in the chart so we will recheck her labs today as well.  She is on medication for hypertension and her blood pressure is borderline today.  She has not had any cardiac symptoms or other new symptoms.  She is due for a mammogram and will set that up.    Review of Systems: As above, systems otherwise reviewed and negative.     Medications, Allergies and Problem List   Patient Active Problem List   Diagnosis     Arachnoiditis     Chronic pain disorder     Lumbar radiculopathy     Moderate episode of recurrent major depressive disorder (H)     Ulcerative colitis (H)     Benign essential hypertension     Current Outpatient Medications   Medication Sig Dispense Refill     albuterol (VENTOLIN HFA) 90 mcg/actuation inhaler INHALE 2 PUFFS INTO THE LUNGS EVERY 6 HOURS AS NEEDED FOR SHORTNESS OF BREATH / DYSPNEA OR WHEEZING       dicyclomine (BENTYL) 20 mg tablet TAKE 1 TABLET (20 MG) BY MOUTH EVERY 6 HOURS  3     DULoxetine (CYMBALTA) 30 MG capsule Take 1 capsule by mouth.       gabapentin (NEURONTIN) 600 MG tablet Take 1.5 tablets (900 mg total) by mouth 3 (three) times a day. 270 tablet 11     ibuprofen (ADVIL,MOTRIN) 800 MG tablet Take 1 tablet (800 mg total) by mouth every 8 (eight) hours as needed for pain. 90 tablet 3     rizatriptan (MAXALT) 10 MG tablet Take 1 tablet (10 mg total) by mouth as needed for migraine. 15 tablet 3     telmisartan-hydrochlorothiazide (MICARDIS HCT) 40-12.5 mg per tablet Take 1 tablet by mouth daily. 90 tablet 3      "tiZANidine (ZANAFLEX) 4 MG tablet Take 1 tablet (4 mg total) by mouth every 8 (eight) hours as needed. 90 tablet 0     traZODone (DESYREL) 100 MG tablet Take 1 tablet (100 mg total) by mouth at bedtime. 90 tablet 3     valACYclovir (VALTREX) 1000 MG tablet TAKE 1 TAB BY MOUTH TWICE A DAY FOR 2 DAYS AT 1ST SIGN OF COLD SORE  1     No current facility-administered medications for this visit.      Allergies   Allergen Reactions     Atenolol Dizziness and Other (See Comments)     Blood pressure drops       Nitrofurantoin      Cephalexin Rash          Physical Exam     BP (!) 128/98 (Patient Site: Left Arm, Patient Position: Sitting)   Pulse 78   Ht 5' 8\" (1.727 m)   Wt 189 lb (85.7 kg)   SpO2 96%   BMI 28.74 kg/m      General: This is an alert female in no apparent distress.     Additional Information   Social History     Tobacco Use     Smoking status: Never Smoker     Smokeless tobacco: Never Used   Substance Use Topics     Alcohol use: Yes     Comment: rare     Drug use: Yes     Comment: medical marijuana          Kamila Cr MD    "

## 2021-05-31 NOTE — PROGRESS NOTES
[facility]  New Patient Consultation        HPI  Sima Ross 54 y.o. is here today, sent to me by  to discuss   Chief Complaint   Patient presents with     Consult     Comorbid conditions include thyroid nodule, memory loss (referral to neurology), HTN, migraine, ulcerative colitis without complications, insomnia.    Pain Story:     Patient reports she was diagnosed with adhesive arachnoiditis in 04/17 and that her surgeon nicked her spinal cord during lumbar fusion surgery 12/2016.  She states she became symptomatic with falls and in January she mentioned it and then February was walking and she had horrible pain in left heel.  She has been able to wean off opioids in the past year and is using medical cannabis.  She describes pain as constant burning, aching, diffuse, deep, and cramping in her lower back, bilateral hips, and down left leg to her foot.    Pain in lower back, pelvis burning in vaginal area, feels she is urinating on herself with sex, burning in right thigh not past her knee which is new to her since gaining weight.  Musscle spasms.  She feels like bones in hips are on fire.      She attends [employer], tried just CBD but doesn't help her pain.  She has red liquid 1/2 mL 3-4 times a day.   She has felt the cannabis has helped her pain and sleep, but is worried about memory as the higher THC levels make her foggy and she is wanting to return to work.  She also states that without working she is unable to continue to afford her medication.    She is requesting a toradol IM injection. She does not want opioids except possibly tramadol as that has also helped her in the past. She was previously seen at [facility].  She was taking MSContin with Dilaudid for breakthrough pain, was on 80 mg three times a day with 8 mg dilaudid tabs.  States it wasn't helping and went from bed to couch for several years and never wants to go back to that.  She states she did  "have an off pill count, unsure which medication or how that happened.   [employer] SCS implanted by them 2 years in September.  Works fairly well, keeps low setting as it irritates her at higher levels.  Unclear if she has ability or has tried the high frequency settings.    She saw neurologist Randal Foster due to memory concerns.  She states this was found to be due to THC.  She tried reducing her neurontin dose for memory as she wants to return to work.  She walks 3 miles a day.  She wants to travel and have sexual relationship with her boyfriend as she loves her life now and wants to be present for it.    Alleviating factors: medications, sleep, massage, heat, SCS, sitting down, laying down, relaxation, meditation  Aggravating factors: standing, sitting, walking, bending, coughing, sneezing, going upstairs, weather change, riding in the car, and getting out of bed.  Pain level today: On a scale of 1-10, the patient rates their pain at a 8.  Pain ratings vary between 6/10 and 9/10.  Function Ratin meaning pain affects the following:     3 - Enjoy     4 - Work, Enjoy     5 - Active, Mood, Work, Enjoy     6 - Sleep, Active, Mood, Work, Enjoy     7 - Walk, Sleep Active, Mood, Work, Enjoy     8 - Relate, Walk, Sleep, Active, Mood, Work, Enjoy  Associated Symptoms: Weight gain 35 pounds attributed to medical cannabis.  Denies weight loss, fever chills, unexplained weight loss, swelling, rash.  Previous Diagnostics & Treatments for Pain:  Surgery - Lumbar discectomy , lumbar fusion L4-5 2016  Injections - [facility]  Imaging - through [facility] 2017 Myelogram and CT  Physical Therapy - last in 2017  Chiropractor/Acupuncture - Denies acupuncture.  Saw chiropractor and \"Just about .\"  Doesn't believe in this.  Massage - intermittently  TENS or bracing - SCS   Pain Psychology or biofeedback - Sikh rigoberto, meditation for pain  Other - eating pad, walking daily, tried pilates, has exercise ball    Social " History: She is unemployed.  Lives with boyfriend, Jaron.  Reports independent ADLs and denies use of AD.  She is driving.  She has an adult child who is healthy.    Patient set goals today in the office to achieve with the pain centers help. They are:    1. I would like to find a regimen with the least side effects possible while I am not in severe pain.  2. Think!!!  Live!!    Past Medical History:   Diagnosis Date     Anxiety      Hypertension      Past Surgical History:   Procedure Laterality Date     ANKLE SURGERY       BACK SURGERY       HYSTERECTOMY         Social  Family History   Problem Relation Age of Onset     Hypertension Mother      Cancer Father      Hypertension Father      Hypertension Brother      Hypertension Maternal Grandmother      Alcohol abuse Maternal Grandfather      COPD Maternal Grandfather      Heart disease Maternal Grandfather      Hypertension Maternal Grandfather      Breast cancer Neg Hx      Colon cancer Neg Hx      Social History     Tobacco Use   Smoking Status Never Smoker   Smokeless Tobacco Never Used     Social History     Substance and Sexual Activity   Alcohol Use Yes    Comment: rare     Social History     Substance and Sexual Activity   Drug Use Yes    Comment: medical marijuana     Social History     Substance and Sexual Activity   Sexual Activity Yes     Partners: Male       Chemical Dependency History: Patient Denies tobacco use (quit in 2016), illicit substance use including THC. She reports 1 shot of liquor per week at most. Denies any chemical dependency evaluation or treatment in the past.  Denies any legal issues related to substance use.    Psychiatric History:  Patient reports no current psychiatrist or psychologist.  Denies any suicidal ideation.  Denies any hospitalizations for mental illness.    Pertinent Pain Medications:  She currently takes Gabapentin 900-1200 mg three times a day varies dose based on how she is feeling; starts 1200 mg in am consistently.  Tizanidine 4 mg TID, Ibuprofen 800 mg three times a day, Maxalt 10 mg prn, and duloxetine 60 mg daily.    Previously tried medications:    NSAIDs: Denies diclofenac, celebrex, etodolac, sulindac, naproxen    Acetaminophen: APAP    Antidepressants: Duloxetine see above, denies TCAs, effexor    Gabapentinoids: Gabapentin see above, denies Lyrica,     Opioids: oxycodone, tramadol    Topicals:     Supplements: Denies    Muscle Relaxants: Tizanidine    Other: Denies      Current Outpatient Medications:      albuterol (VENTOLIN HFA) 90 mcg/actuation inhaler, INHALE 2 PUFFS INTO THE LUNGS EVERY 6 HOURS AS NEEDED FOR SHORTNESS OF BREATH / DYSPNEA OR WHEEZING, Disp: , Rfl:      dicyclomine (BENTYL) 20 mg tablet, TAKE 1 TABLET (20 MG) BY MOUTH EVERY 6 HOURS, Disp: , Rfl: 3     DULoxetine (CYMBALTA) 30 MG capsule, Take 1 capsule by mouth., Disp: , Rfl:      gabapentin (NEURONTIN) 600 MG tablet, Take 1.5 tablets (900 mg total) by mouth 3 (three) times a day., Disp: 270 tablet, Rfl: 11     ibuprofen (ADVIL,MOTRIN) 800 MG tablet, Take 1 tablet (800 mg total) by mouth every 8 (eight) hours as needed for pain., Disp: 90 tablet, Rfl: 3     rizatriptan (MAXALT) 10 MG tablet, Take 1 tablet (10 mg total) by mouth as needed for migraine., Disp: 15 tablet, Rfl: 3     telmisartan-hydrochlorothiazide (MICARDIS HCT) 40-12.5 mg per tablet, Take 1 tablet by mouth daily., Disp: 90 tablet, Rfl: 3     tiZANidine (ZANAFLEX) 4 MG tablet, Take 1 tablet (4 mg total) by mouth every 8 (eight) hours as needed., Disp: 90 tablet, Rfl: 0     traZODone (DESYREL) 100 MG tablet, Take 1 tablet (100 mg total) by mouth at bedtime., Disp: 90 tablet, Rfl: 3     valACYclovir (VALTREX) 1000 MG tablet, TAKE 1 TAB BY MOUTH TWICE A DAY FOR 2 DAYS AT 1ST SIGN OF COLD SORE, Disp: , Rfl: 1      Review of Systems:  12 point systems were reviewed with pt as documented on pt health form of 8/30/2019. ROS was positive for weight gain (35 pounds), difficulty sleeping,  "headache, vision changes, glasses, pain in eyes, blurry vision, burning pain with urinating, pelvic pain, menopause, muscle pain, stiffness, back pain, weakness, memory difficulty, and anxiety. the rest of the systems were pertinent negative.  (General, Cardiac, Pulmonary, Integumentary, Musculoskeletal, Neurological,GI, , GYN, Endocrine, Hematological and Psychological)    Exam  Vitals:    08/30/19 0849   BP: 135/89   Patient Site: Right Arm   Patient Position: Sitting   Pulse: 81   Resp: 16   Weight: 174 lb (78.9 kg)   Height: 5' 9\" (1.753 m)       Constitutional-General:  Well nourished, well developed, well groomed, healthy appearing individual.  Weight is overweight, appears stated age and presents alone. She is uncomfortable, laying on exam table for part of visit and shifting positions often.  Psych-Mental Status: A & O in no acute distress. Speech is fluent. Thought process normal. Recent and remote memory are intact.  Attention span and concentration are normal. Displays appropriate mood and affect.   H,E,N,T- Symmetrical, Eyes- Perrla, Nares- patents bilaterally, throat- trachea is midline, airway is patent, unlabored respiratory effort.  Lymph-cervical lymph system (anterior and posterior) without palpable nodules or tenderness throughout. Supraclavicular chain without palpable nodules or tenderness throughout.   Cardiovascular- Bilateral legs and feet are warm.  Pulses are palpable and grade 2 at the posterior tibial arteries bilaterally, no edema noted.  Musckuloskeletal  Gait:  Gait is normal.  Ambulates independently with normal gait.  Gross Motor: Toe walking, heel walking, and Romberg tests are normal.   Strength:  Bilateral upper and lower extremity strength testing (see below). No atrophy or tone abnormalities noted.   Muscles   Right  Left   Shoulder Abd   5 5  Arm Flex   5 5  Arm Ext   5 5  Wrist Ext   5 5  Finger Ext   5 5  Dors Interossei  5 5   /FF   5 5  Abd/Op Pollicis  5 5  Hip Flex "   5 5  Hip Abd   5 5    Quads    5 5   Hamstrings   5 5  Dorsiflex   5 5  Plantarflex  5 5  Toe ext   5 5  Toe flex   5 5    Sensation:  No loss of sensation noted to light touch in both upper and lower extremities. No dermatomal abnormal distributions noted.  Spine ROM:  Cervical ROM flexion WNL without pain, extension WNL without pain, rotation right WNL without pain, rotation left WNL without pain. Thoracic ROM flexion WNL without pain, extension WNL without pain,  Lumbar ROM flexion WNL with slight pain, extension limited due to pain, lateral bending right and left WNL without pain.  Provocative Maneuvers:  Pepe Micah negative bilaterally.  Piriformis test negative bilaterally. Facet loading test negative bilaterally. SLR test was negative bilaterally.  Spine Palpation:  Inspection and palpatory examination of the cervical spine no tenderness noted in the cervical spine or bilateral trapezius. Inspection and palpatory examination of the thoracic spine no tenderness noted in the thoracic spine T1-12.  Inspection and palpatory examination of the lumbar spine positive tenderness noted in the L4-S1    Joint Palpation: Inspection and palpation examination of the upper/lower extremities is negative for deformity, swelling, ecchymosis, erythema. Tenderness is not noted in bilateral hips, knees.  Neuro:  Bilateral upper and lower extremity coordination and muscle stretch reflexes are physiologic and symmetric 2/4.  Babinski responses are downgoing.  No clonus bilaterally. Negative hoffmans sign bilaterally.   Integumentary: no rashes or breaks in the skin, no open wounds. Exposed skin is clean, dry, intact to inspection and palpation.    Lab:  Last UDS on 8/30/2019 was taken today and is pending.      Imaging:    :  Dated 8/30/2019 was reviewed with the patient    ORT: 6 (moderate)    Assessment :  After reviewing the patients chart and physical findings I agree that the patient would benefit from our pain center  multidisciplinary treatment approach.   I have discussed with the  patient today the diagnosis of adhesive arachnoditis, history of lumbar fusion. We reviewed the natural progression of this diagnosis.  We discussed possible benefits and detriments of physical therapy, integrative care such as acupuncture/chiropractor, medication management, behavorial therapy, and other alternative treatments including supplements and diet.  We also discussed future possible treatment options in a stepwise fashion, including interventional pain procedures and injections and possible surgical referral if needed.      Interventions: Continue use of SCS.  Patient is not interested in intrathecal pump which was discussed with [facility].  Will review previous injections done there.  Physical Medicine and rehabilitations: Consider return to postural rehabilitation or warm pool physical therapy evaluation and treatment for pain control, improvement of function, and assisting the patient to develop a daily routine to support achievement and, where necessary, readjustment of habits and roles according to the patient capacity and life situation.   Medication Management: We discussed medication options to manage the patient. After discussion of potential adverse effects versus benefits, the patient agreed to proceed with a trial of etodolac three times a day for pain in place of Ibuprofen as she reports having great relief with toradol, however we discuss renal risk and not over utilizing this medication.  Will also allow Toradol IM every 1-2 months as long as renal labs are stable with home doses 10 mg every 6 hours prn pain x 5 days max #20 tabs.  She may utilize medrol dose pack 1-2 times a year for pain flares.  May consider tramadol po or buprenorphine medications for pain as needed.  She is interested in reducing use of medical cannabis due to memory concerns and will continue gabapentin and duloxetine.  Behavioral Health: We  discussed the body mind process of pain. We discussed the importance of cognitive behavioral therapy to help patient to control pain, address any psychiatric condition that may contribute to patient's experience of pain. The patient agreed to be referred for a behavioral health diagnostic assessment.      The patient understands that opiate/controlled pain medication is not prescribed during the initial patient consultation at the pain center. If the patient is on pain medications for chronic pain, the PCP or prescribing provider may choose  to prescribe until the patient presents for follow up at the pain center. Medication prescriptions provided by the pain center will depend on the UA results, safe prescribing practices established by the CDC guidelines and patient response to their current treatment regimen at the follow up visit.  I would like to review TCP notes as she reports failing a pill count there and will review those details prior to prescribing any opioids.    Plan recommended today:    Follow up in 4-6 weeks with Suzi CORTES    Sign a Release of Information for [facility] lumbar imaging and [facility] so we can obtain your records for our next visit    Continue your current regimen of alleviating factors as reviewed today    Please see your current provider for any refill of an opioid prescription; they may choose to provide a refill until we have your urine screen back. They will continue to manage your general health and have requested you see the pain center for pain management. Please discuss any health concerns with your primary care physician.    [place] Precautions are taken with every patient which includes a  report and drug screen. A urine drug screen will be taken today for baseline screening.     Behavioral Therapy- we have Dena and Quincy here at the pain center for support as needed.    Physical and Occupational Therapy- will consider warm pool PT, acupuncture, and/or postural  restoration.  No referrals made today.    Injections- The patient is given Toradol 30 mg IM today to treat moderate to severe pain.  Patient denies contraindications to use including allergy/hypersensitivity to NSAIDs, active peptic ulcer disease or history of GI bleeding, anticoagulant medication use, renal disease, pregnancy/nursing, or high risk CV disease.  We discussed potential side effects including but not limited to: abdominal pain, nausea, vomiting, heartburn, constipation/diarrhea, drowsiness, headaches, injection site pain, rash, dizziness, itching, sweating.    MTM visit with the pharmacist Robi Mason PharmD in the future as needed    Non-opoid medical management includes- continue gabapentin and duloxetine as prescribed (no refills needed).  You may take toradol 10 mg tab 1 every 6 hours with food for next 5 days.  Do not take other NSAIDS during this time (ibuprofen, motrin, aleve, etc).  Once toradol is gone, you may start etodolac 200 mg three times a day in place of ibuprofen to see how that works.  Once UDT returns appropriate, will also consider tramadol 50 mg three times a day as needed.      May continue medical cannabis, if you need to re-register in October we can review at that time.    Education was provided today in regards to the patient's specific diagnosis     Diagnosis  1. Medical cannabis use    2. Arachnoiditis    3. Chronic pain disorder    4. Memory loss        Patient reminders:   Diagnostics: UDT/SWAB collected 8/30/2019 and results are pending.  UDT/SWAB:  Patient required a random Urine Drug Testing, due to the need to comply with [place] Policy Guidelines and CDC Guideline for the use of any controlled substances. This is to ensure that patient is compliant with treatment, and monitor for risks such as diversion, abuse, or any other aberrant behaviors. Patient is either being considered for or taking a controlled substance. Unexpected findings will be discussed and  treatment decision may be adjusted. Testing is being implemented across the board randomly w/o bias related to age, race, gender, socioeconomic status or Scientologist affiliation.     Greater than 50% of this 60 minute visit spent in face to face discussion of patient pain symptoms, pain history and treatments, and recommendations for multidisciplinary care approaches including medications, PT/OT, Behavioral Health, integrative care, medical cannabis, SCS device.    Suzi Segovia PA-C  [facility]  [address]  [place] [address]  Ph: [phone]  Fax: [phone]

## 2021-05-31 NOTE — TELEPHONE ENCOUNTER
Specialty schedulers can you gals please assist with this referral. Please let us know if an referral to endo is needed. Please contact patient with information, thanks much.

## 2021-05-31 NOTE — TELEPHONE ENCOUNTER
Who is calling:  Patient  Reason for Call:  Was referred to Endocrinology, Dr Mayfield. This provider is longer at the office.  Who would you like patient to see for evaluation of thyroid nodule  Date of last appointment with primary care: 7/30/19  Okay to leave a detailed message: Yes

## 2021-05-31 NOTE — PATIENT INSTRUCTIONS - HE
Plan recommended today:    Follow up in 4-6 weeks with Suzi CORTES    Sign a Release of Information for [facility] lumbar imaging and [facility] so we can obtain your records for our next visit    Continue your current regimen of alleviating factors as reviewed today    Please see your current provider for any refill of an opioid prescription; they may choose to provide a refill until we have your urine screen back. They will continue to manage your general health and have requested you see the pain center for pain management. Please discuss any health concerns with your primary care physician.    [place] Precautions are taken with every patient which includes a  report and drug screen. A urine drug screen will be taken today for baseline screening.     Behavioral Therapy- we have Dena and Quincy here at the pain center for support as needed.    Physical and Occupational Therapy- will consider warm pool PT, acupuncture, and/or postural restoration.  No referrals made today.    Injections- The patient is given Toradol 30 mg IM today to treat moderate to severe pain.  Patient denies contraindications to use including allergy/hypersensitivity to NSAIDs, active peptic ulcer disease or history of GI bleeding, anticoagulant medication use, renal disease, pregnancy/nursing, or high risk CV disease.  We discussed potential side effects including but not limited to: abdominal pain, nausea, vomiting, heartburn, constipation/diarrhea, drowsiness, headaches, injection site pain, rash, dizziness, itching, sweating.    MTM visit with the pharmacist Robi Mason PharmD in the future as needed    Non-opoid medical management includes- continue gabapentin and duloxetine as prescribed (no refills needed).  You may take toradol 10 mg tab 1 every 6 hours with food for next 5 days.  Do not take other NSAIDS during this time (ibuprofen, motrin, aleve, etc).  Once toradol is gone, you may start etodolac 200 mg three times a day in  place of ibuprofen to see how that works.  Once UDT returns appropriate, will also consider tramadol 50 mg three times a day as needed.      May continue medical cannabis, if you need to re-register in October we can review at that time.    Education was provided today in regards to the patient's specific diagnosis

## 2021-05-31 NOTE — TELEPHONE ENCOUNTER
Patient Returning Call  Reason for call:  Patient calling back  Information relayed to patient:  Relayed below message   Patient has additional questions:  No  If YES, what are your questions/concerns:  none  Okay to leave a detailed message?: No call back needed

## 2021-05-31 NOTE — TELEPHONE ENCOUNTER
----- Message from Kamila Cr MD sent at 7/30/2019  4:27 PM CDT -----  Please call and let her know that her labs found normal thyroid level, CBC, kidney function and liver function.  Markers of inflammation were normal.  Her B12 level is normal but on the low end of normal so she may want to add a B12 supplement daily.  Her lipids are high with an LDL of 177 and her glucose is borderline at 110.  We'll need to monitor this over time and can discuss at her follow up in more detail.  Thanks.

## 2021-05-31 NOTE — TELEPHONE ENCOUNTER
Spoke to Sima, pt stated that Dr. Rousseau office is out of her insurance network. Pt was advised to call her insurance to see who is in network and will get back to us.

## 2021-05-31 NOTE — TELEPHONE ENCOUNTER
Left message to call back for: Sima  Information to relay to patient:  See note   Parvin Hinojosa CMA ([facility])

## 2021-05-31 NOTE — TELEPHONE ENCOUNTER
Dr. Cr please advise if you have any recommendations on a new endocrinologist or schedule with anyone else, thanks.

## 2021-05-31 NOTE — TELEPHONE ENCOUNTER
Order was sent to [facility]- Spoke to , pt last saw Dr. Rousseau in May 2019. Pt was scheduled for a Biopsy on 8/2/19 but appt was cancelled and has not been reschedule. No future appt with Dr. Rousseau.

## 2021-06-01 NOTE — PROGRESS NOTES
Patient presents to the clinic today for a follow up with Suzi Segovia PA-C regarding tailbone pain. Patient describes their pain as burning. Her/His function score is 8.

## 2021-06-01 NOTE — PATIENT INSTRUCTIONS - HE
The patient is given Toradol 30 mg IM today to treat moderate to severe pain.  Patient denies contraindications to use including allergy/hypersensitivity to NSAIDs, active peptic ulcer disease or history of GI bleeding, anticoagulant medication use, renal disease, pregnancy/nursing, or high risk CV disease.  We discussed potential side effects including but not limited to: abdominal pain, nausea, vomiting, heartburn, constipation/diarrhea, drowsiness, headaches, injection site pain, rash, dizziness, itching, sweating.  Toradol 10 mg 1 tab every 6 hours prn max 5 days.  Do not take other NSAIDs during this time.  Refill given.  Once toradol prescription completed, resume Etodolac 200 mg three times a day with food.  Refill given.  Continue Gabapentin 900 mg three times a day - refills at pharmacy from Dr. Cr  Continue Tizanidine 4 mg every 8 hours as needed for spasm - refill given.  Continue Duloxetine 30 mg two times a day  May take Tramadol 50 mg 1-2 tabs two times a day prn pain - #120 tabs given for 30 days.  Bring bottle next visit to evaluate use/quantity.    Opioid agreement and consent form signed today.  Discussed bringing these copies and your most recent After Visit Summary (AVS) from our appointment to the pharmacy when you are refilling controlled medications to assist with any additional information the pharmacist may require.    Continue walking for exercise as discussed.  Reviewed finding an indoor location with season changes.  Continue use of SCS device - will have you meet at least annually with [employer] to monitor use/settings/equipment.  Continue medical cannabis use as needed/able  Follow-up in 4-6 weeks with Suzi CORTES to evaluate the above plan of care.

## 2021-06-01 NOTE — PROGRESS NOTES
PAIN CENTER PROGRESS NOTE    Subjective:   Sima Ross is a 54 y.o. female who presents for evaluation of low back pain.  Consult was 08/30/19.    Major issues:  1. Arachnoiditis    2. Chronic pain syndrome    3. Chronic, continuous use of opioids    4. Medical cannabis use    5. Chronic pain disorder      Pain location and description: 7/10 constant burning pain in tailbone, right hip and leg.  Function rated 8.  At best, pain rated 5/10, at worst pain rated 8/10, on average pain rated 7/10.    Gait disturbance: None reported  Exacerbating factors: sitting  Alleviating factors: heating pad, walking, laying down  Associated symptoms: Night pain.  Denies numbness, weakness, bowel or bladder incontinence, fever/chills, unexplained weight loss.  Functional Pain symptoms: Pain affects sleep at night, walking, work, ADLs, relationships/social, sexual health, mood (depressed, angry, frustrated, anxious, helpless/hopeless).  Reports function has improved with current pain treatments.  Adverse effects of medications: None reported  Current treatment efficacy: Good.  Taking duloxetine 30 mg two times a day, gabapentin 900 mg three times a day, Etodolac 200 mg three times a day, tizanidine 4 mg three times a day prn, toradol 10 mg prn.   Current treatment compliance: New to pain center.  Had 1 episode of self-escalation of medication at [facility], tapered off schedule 2 opioids and does not wish to take.      Since the last Pain Center visit, the patient denies any use of anticoagulant medications. Denies any new diagnostic testing, new treatments or new medical conditions, any changes in medications, or any ED/urgent care visits.  Patient denies the chance of being pregnant or wanting to become pregnant.  She reports since last visit, pain improved due to combination of NSAID and toradol injection.  This helped for first 3 weeks.  She reports pain has progressively gotten worse after that time.  She denies new  "injuries or falls.    Consult recommendations:  Continue use of SCS  Postural restoration or warm pool PT  Trial etodolac for NSAID three times a day, discontinue Ibuprofen.    Allow toradol IM every 1-2 months as needed as long as renal labs are stable and toradol 10 mg oral tabs.   Tramadol po or buprenorphine medications for pain.    Continue gabapentin and duloxetine  Behavioral health referral    Received [facility] records, reviewed.      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 She states towards end of day increase in pain.  States she sits a lot at work.  Not ergonomic or sit/stand desk.  Can reposition and has an hour lunch can get up and move around or lay down in car seat.  Colder weather bothers her more, woke up in pain this morning.  She states feels like a lighter at sacral area.     She is not using the medical cannabis as much at work.  She feels \"stupid\" on it.  She takes her 0.5 mL red once at work.  Doesn't want to be \"high\" and has to think a lot at work.  Walking 3 miles a day after work.  Helps her sleep better and mentally helps her as well.    She is asking about tramadol  mg two times a day prn for afternoon and night dose as this is when pain is worse.  She understands this is an opioid through our discussion.     Using SCS 24 hours a day, 7 days per week.  She states for her it has to be kept low settings because otherwise irritating.  She is at 2.1 amplitude.  [employer] device.     Review of Systems  Constitutional: Sleep disturbance at times.  Denies fever, chills, night sweats, lethargy, weight loss, weight gain  Musculoskeletal: Positive for back pain.  Denies muscle pain, weakness, joint pain, joint swelling, recent falls.  Gastrointestional: Denies difficulty swallowing, change in appetite, abdominal pain, constipation, nausea, vomiting, diarrhea, GERD, fecal incontinence.  Genitourinary: Denies urinary incontinence, dysuria, hematuria, UTI, " "frequency, hesitancy, change in libido/erectile dysfunction.  Neurologic: Denies headaches, confusion, seizure, weakness, changes in balance, changes in speech.  Psychiatric: Denies depression, anxiety, memory loss, psychoses, suicidal ideation, substance use/abuse.     Objective:     Vitals:    10/01/19 0756   BP: (!) 136/98   Pulse: 78   Resp: 16   Weight: 174 lb (78.9 kg)   Height: 5' 9\" (1.753 m)   PainSc:   7     Physical Exam  Constitutional- General appearance: Normal.  Well developed, comfortable, within normal limits of ideal weight and appearance reflects stated age.  No acute distress or pain behaviors noted.  Presents alone.  Psychiatric- Judgment and insight: Normal.  Speech: Normal rhythm.  Thought process: Normal.  No abnormal thoughts reported. Alert & Oriented to person, place, and time.  Recent and remote memory: Normal.  Mood and affect: Normal.  Observed mood: appropriate.   Respiratory- Breathing is non-labored; normal rhythm and rate.  Cardiovascular- Extremities warm and well perfused, no peripheral edema or varicosities.  Dermatologic- Exposed skin is clean, dry, and intact to inspection and palpation.  Musculoskeletal- Gait and station: Normal.  Gait evaluation demonstrates ambulating independently.  Patient does not have difficulty rising from a seated position.    Lab:  Last UDS on 08/30/19 was reviewed and was appropriate.    [place]  Reviewed on 10/01/19    Imaging:  None new.    Assessment:   Sima Ross is a 54 y.o. female seen in clinic today for adhesive arachnoditis, history of lumbar fusion.  She reports her currently plan is offering a lot of relief and has been able to restart work and walking 3 miles daily. She reports most difficulty controlling pain in evening/night after full day of work and asks about restarting tramadol for pain at these times.  She is maximizing her NSAIDs, muscle relaxants, gabapentin, topicals, and minimizing medical cannabis use due to her job.  She " "would like to repeat toradol IM.  We discuss monitoring renal function closely with next labs in November.  She denies other side effects.  She will continue use of SCS device, discuss a programming session with [employer] in the future for assistance with maximizing the settings and checking battery life, lead use, etc. She expresses a lot of gratitude today that she is back at work and socializing more; she understands she will never be \"pain free\" but feels she is functioning well.     Plan:   The patient is given Toradol 30 mg IM today to treat moderate to severe pain.  Patient denies contraindications to use including allergy/hypersensitivity to NSAIDs, active peptic ulcer disease or history of GI bleeding, anticoagulant medication use, renal disease, pregnancy/nursing, or high risk CV disease.  We discussed potential side effects including but not limited to: abdominal pain, nausea, vomiting, heartburn, constipation/diarrhea, drowsiness, headaches, injection site pain, rash, dizziness, itching, sweating.  Toradol 10 mg 1 tab every 6 hours prn max 5 days.  Do not take other NSAIDs during this time.  Refill given.  Once toradol prescription completed, resume Etodolac 200 mg three times a day with food.  Refill given.  Continue Gabapentin 900 mg three times a day - refills at pharmacy from Dr. Cr  Continue Tizanidine 4 mg every 8 hours as needed for spasm - refill given.  Continue Duloxetine 30 mg two times a day  May take Tramadol 50 mg 1-2 tabs two times a day prn pain - #120 tabs given for 30 days.  Bring bottle next visit to evaluate use/quantity.    Opioid agreement and consent form signed today.  Discussed bringing these copies and your most recent After Visit Summary (AVS) from our appointment to the pharmacy when you are refilling controlled medications to assist with any additional information the pharmacist may require.    Continue walking for exercise as discussed.  Reviewed finding an indoor " location with season changes.  Continue use of SCS device - will have you meet at least annually with [employer] to monitor use/settings/equipment.  Continue medical cannabis use as needed/able  Follow-up in 4-6 weeks with Suzi CORTES to evaluate the above plan of care.    Suzi Segovia PA-C  [facility]  [address]  [place] [address]  Ph: [phone]  Fax: [phone]

## 2021-06-03 VITALS — HEIGHT: 68 IN | BODY MASS INDEX: 28.64 KG/M2 | WEIGHT: 189 LBS

## 2021-06-03 VITALS — WEIGHT: 174 LBS | HEIGHT: 69 IN | BODY MASS INDEX: 25.77 KG/M2

## 2021-06-03 NOTE — TELEPHONE ENCOUNTER
"Patient left message on triage line that \"her stimulator was broken and she wasn't seeing Suzi until mid January. Returned call and left message that she could meet with the company representative before her appointment with Suzi. Also sent message to representative.   "

## 2021-06-03 NOTE — TELEPHONE ENCOUNTER
Please have the information from [employer]/pain center faxed to us for records (patient will need to sign JOVANA with [facility]).  I advise her that she needs to see Dr. Cabrera in consult here if she wants it to be replaced, and he may assess to see if she is appropriate for a [employer] device instead of [employer].

## 2021-06-03 NOTE — TELEPHONE ENCOUNTER
"Returned pt's call as requested. She states her SCS hasn't been working recently. She met with the [employer] Rep at another pain clinic. \" They said I have a broken lead and need a new stimulator.\" Pt upset and wants Suzi's opinion. States \"[employer] is a bunch of crooks. I want a stimulator from another company.\" States she only trusts Suzi and wants her opinion on what to do next.  Also requested Toradol injection. Nurse advised pt to call her PMD as we don't schedule visits just for Toradol injections, but she said \" you don't know what I've been through.\" She decided to try po Toradol instead, as she still has an additional refill.  "

## 2021-06-03 NOTE — TELEPHONE ENCOUNTER
Prior Authorization Request  Who s requesting:  [facility]  [facility]  Medication Name: tramadol 50mg, 4/day  Insurance Plan: [employer]/ [employer]  Insurance Member ID Number:  [id number]  Informed patient that prior authorizations can take up to 10 business days for response:   Yes  Okay to leave a detailed message: Yes

## 2021-06-03 NOTE — TELEPHONE ENCOUNTER
Medication being requested: LODINE, TIZANIDINE  Last visit date: 10/1 Provider: IGNACIO  Next visit date: NOT SCHED Provider: IGNACIO  Expected follow up: 4-6 WK  CANCELED 10/21  MTM visit (Pain Center) date: NO  Pertinent between visit information about requested medication (telephone, mychart, prior authorization, concerns): NO  Last date prescribed: 10/1  Provider responsible: IGNACIO  Spoke with patient: NO  Script being sent to provider - dates and quantity: LAST FILL 11/2 SET UP FOR REFILL 12/2  Requested Prescriptions     Pending Prescriptions Disp Refills     etodolac (LODINE) 200 MG capsule [Pharmacy Med Name: ETODOLAC 200 MG CAPSULE] 90 capsule 1     Sig: Take 1 capsule (200 mg total) by mouth every 8 (eight) hours.     tiZANidine (ZANAFLEX) 4 MG tablet [Pharmacy Med Name: TIZANIDINE HCL 4 MG TABLET] 90 tablet 1     Sig: TAKE 1 TABLET BY MOUTH EVERY 8 HOURS AS NEEDED       [facility]

## 2021-06-03 NOTE — TELEPHONE ENCOUNTER
Called patient to relay information/request from Suzi regarding information requested and that she needs to schedule consult appointment with Dr Cabrera. No answer, left message.

## 2021-06-03 NOTE — TELEPHONE ENCOUNTER
Central PA team  [phone]  Pool: HE PA MED ([id number])          PA has been initiated.       PA form completed and faxed insurance via Cover My Meds     Key:  [id number]     Medication:  TRAMADOL 50MG    Insurance:  [employer]         Response will be received via fax and may take up to 5-10 business days depending on plan

## 2021-06-03 NOTE — TELEPHONE ENCOUNTER
Medication being requested: Tramadol  Last visit date: 10/1/2019.  Provider: RW  May take Tramadol 50 mg 1-2 tabs two times a day prn pain - #120 tabs given for 30 days. Bring bottle next visit to evaluate use/quantity.   Next visit date: 11/21/2019.  Provider: IGNACIO  Expected follow up: 4-6 weeks  MTM visit (Pain Center) date: NA  UDT date: 8/2019  Agreement date: 10/2019   snipped in:  No Access  Pertinent between visit information about requested medication (telephone, mychart, prior authorization, concerns, comments): NA  Script being sent to provider by nurse- dates and quantity:   Requested Prescriptions     Pending Prescriptions Disp Refills     traMADol (ULTRAM) 50 mg tablet 120 tablet 0     Sig: Take 1-2 tablets ( mg total) by mouth 2 (two) times a day as needed for pain.       [facility]  Standing orders for withdrawal protocol implemented: RIVERA

## 2021-06-04 VITALS — BODY MASS INDEX: 27.02 KG/M2 | WEIGHT: 182.4 LBS | HEIGHT: 69 IN

## 2021-06-04 VITALS — BODY MASS INDEX: 25.18 KG/M2 | WEIGHT: 170 LBS | HEIGHT: 69 IN

## 2021-06-04 VITALS — HEIGHT: 69 IN | WEIGHT: 174 LBS | BODY MASS INDEX: 25.77 KG/M2

## 2021-06-04 NOTE — PATIENT INSTRUCTIONS - HE
Consult today with Dr Cabrera  Stimulator Leads checked under Xray  Please sign release of information for [employer] notes and will prior authorize for Stimulator replacement after we receive them.

## 2021-06-04 NOTE — TELEPHONE ENCOUNTER
Patient spoke with , and per   Kay , patient was very rude to her.  was offering to help her, but patient said, that  didn't know what she was doing, and patient didn't want to talk to her anymore. Wanted to be transferred to nurse line.    Patient left message, that she wanted a refill of Ultram,  And wants to get 6 per day, not 4 per day. Also requesting a shot of Toradol.

## 2021-06-04 NOTE — TELEPHONE ENCOUNTER
I called patient this morning, was ok to leave a detailed voicemail so I did that.  I advised her I cannot make change to tramadol without visit, but did send refill to 12/13 for same dosing.  I did tell her to sign JOVANA and get on Dr. Cabrera's schedule for a consult regarding SCS.  I asked her to be respectful of staff on the phone trying to help her and that hanging up is not appropriate or tolerated.  If she hangs up again on staff, document it and we will not call her back - I will take to case review.  I do not think there is anything further to be done at this time.

## 2021-06-04 NOTE — TELEPHONE ENCOUNTER
Spoke with pt today, she was pleasant with me on phone. She is requesting an appt with Dr. Cabrera regarding her SCS. Spoke with Suzi to verify if this was in line with her plan of care for patient. Suzi approved pt can make an appt with Rick. Release is already on file,  will request most recent records from last pain clinic and make a consult appt with Rick.

## 2021-06-05 NOTE — TELEPHONE ENCOUNTER
Refill request: tizanidine 4 mg, there is a remaining refill on current RX.  No refill necessary at this time

## 2021-06-05 NOTE — TELEPHONE ENCOUNTER
Medication being requested: tizanidine 4 mg  Last visit date:1/15  Provider: IGNACIO  Next visit date: 3/11 Provider: IGNACIO  Expected follow up: 8 weeks  MTM visit (Pain Center) date: no  Refused as there are remaining refills on current RX

## 2021-06-05 NOTE — PATIENT INSTRUCTIONS - HE
The patient is given Toradol 30 mg IM today to treat moderate to severe pain.  Patient denies contraindications to use including allergy/hypersensitivity to NSAIDs, active peptic ulcer disease or history of GI bleeding, anticoagulant medication use, renal disease, pregnancy/nursing, or high risk CV disease.  We discussed potential side effects including but not limited to: abdominal pain, nausea, vomiting, heartburn, constipation/diarrhea, drowsiness, headaches, injection site pain, rash, dizziness, itching, sweating.  Toradol 10 mg 1 tab every 6 hours prn max 5 days.  Do not take other NSAIDs during this time.  Refill given.  Once toradol prescription completed, resume Etodolac 200 mg three times a day with food.    Continue Gabapentin 900 mg three times a day - refills at pharmacy from Dr. Cr  Continue Tizanidine 4 mg every 8 hours as needed for spasm - refill given.  May take Tramadol 50 mg 1-2 tabs 3 times a day prn pain - #180 tabs given for 30 days.    Continue walking for exercise as discussed.   Continue medical cannabis - will need to reregister, email provided today.  Patient is not pursuing SCS lead revision at this time; will leave this option open for the future if needed.  Follow-up in 2 months with Suzi CORTES to evaluate the above plan of care.

## 2021-06-05 NOTE — PROGRESS NOTES
Patient is here for a follow up appointment with Suzi Segovia PA-C. Patient has sacrum, right leg and low back pain, rates pain 6. Wants refills from today sent to the new pharmacy. F=7.

## 2021-06-05 NOTE — PROGRESS NOTES
"PAIN CENTER PROGRESS NOTE    Subjective:   Sima Ross is a 54 y.o. female who presents for evaluation of low back pain.  Consult was 08/30/19.    Major issues:  1. Chronic, continuous use of opioids    2. Chronic pain syndrome    3. Medical cannabis use    4. Arachnoiditis      Pain location and description: 6/10 constant \"like someone smashed my sacrum\" pain in tailbone, right hip and leg to knee.  Function rated 7.  At best, pain rated 6/10, at worst pain rated 8/10, on average pain rated 7/10.    Gait disturbance: None reported  Exacerbating factors: sitting for a long time  Alleviating factors: heating pad, walking, laying down/rest, medications (tramadol)  Associated symptoms: Denies pain at night, numbness, weakness, bowel or bladder incontinence, fever/chills, unexplained weight loss.  Functional Pain symptoms: Pain affects sleep at night, work, ADLs, relationships/social, sexual health, mood (depressed, angry, frustrated, anxious).  Reports function has improved with current pain treatments.  Adverse effects of medications: None reported  Current treatment efficacy: Good.  Taking gabapentin, Etodolac 200 mg three times a day, tizanidine 4 mg three times a day prn, toradol 10 mg prn, tramadol 50 mg four times a day prn.  Current treatment compliance: New to pain center.  Had 1 episode of self-escalation of medication at [facility], tapered off schedule 2 opioids and does not wish to take.  Has returned back to work full-time.    Since the last Pain Center visit, the patient denies any use of anticoagulant medications. Denies any new diagnostic testing, new treatments or new medical conditions, any changes in medications, or any ED/urgent care visits.  Patient denies the chance of being pregnant or wanting to become pregnant.  Since last visit, her SCS device stopped working for her.  She was seen by Dr. Cabrera on 12/11/19 and leads checked under x-ray, was able to review programming and leads are " "not working.  She states at first this was difficult for her to manage but now she has adjusted and she is unsure that she wants to pursue a revision.  She asks if ok to just wait on this and keep as an option.    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 She states towards end of day increase in pain.  States she sits a lot at work.  Not ergonomic or sit/stand desk.  Can reposition and has an hour lunch can get up and move around or lay down in car seat.  Colder weather bothers her more, but she has not missed work due to pain.  She values that she can go to work each day to provide for herself and she reports she likes the distraction from her pain.    She wants to discuss Tramadol dose change to 100 mg three times a day instead of two times a day. She states it is so effective for her pain and lasts depending 4-6 hours.  She takes etodolac in between doses.  She states she doesn't find tylenol helpful and worried about it causing liver disease.  Takes tramadol dose before work, repeats dose around 1500.  She would like to have another dose for later evening/bedtime.    Gabapentin tries to take two times a day 1200 mg in am, 900 mg in evening.  States she does want to take more than that as she forgets often her afternoon dose and worried about weight gain from it. States she has been more sedentary and gained 20 pounds.  Not using her treadmill or bike right now but these are still at her home.    She is not using the medical cannabis as much at work.  She feels \"stupid\" on it as she feels it impairs her memory.  She does use at night after work and helps her sleep better and mentally helps her as well. She states cannabis use increased since being without her SCS device; she states she uses every evening and lays down after that to rest her back.  She states 45 minutes to get spasms to calm down.  Can get up and do laundry, tidy up, do dinner.  Uses again before bedtime.  " "Follows [place] [facility], red vape.  She is interested in higher CBD levels.  She has to recertify; I cannot view her on the registry as she was enrolled originally from [facility] [place] [facility].    Review of Systems  Constitutional: Sleep disturbance at times.  Denies fever, chills, night sweats, lethargy, weight loss, weight gain  Musculoskeletal: Positive for back pain.  Denies muscle pain, weakness, joint pain, joint swelling, recent falls.  Gastrointestional: Denies difficulty swallowing, change in appetite, abdominal pain, constipation, nausea, vomiting, diarrhea, GERD, fecal incontinence.  Genitourinary: Denies urinary incontinence, dysuria, hematuria, UTI, frequency, hesitancy, change in libido.  Neurologic: Denies headaches, confusion, seizure, weakness, changes in balance, changes in speech.  Psychiatric: Denies depression, anxiety, memory loss, psychoses, suicidal ideation, substance use/abuse.     Objective:     Vitals:    01/15/20 0850   BP: 138/80   Pulse: 75   Resp: 16   Weight: 182 lb 6.4 oz (82.7 kg)   Height: 5' 9\" (1.753 m)   PainSc:   6   PainLoc: Back     Physical Exam  Constitutional- General appearance: Well developed, uncomfortable reclining on exam table during visit, overweight and appearance reflects stated age.  No acute distress or pain behaviors noted.  Presents alone.  Psychiatric- Judgment and insight: Normal.  Speech: Normal rhythm.  Thought process: Normal.  No abnormal thoughts reported. Alert & Oriented to person, place, and time.  Recent and remote memory: Normal.  Mood and affect: Normal.  Observed mood: appropriate.   Respiratory- Breathing is non-labored; normal rhythm and rate.  Cardiovascular- Extremities warm and well perfused, no peripheral edema or varicosities.  Dermatologic- Exposed skin is clean, dry, and intact to inspection and palpation.  Musculoskeletal- Gait and station: Normal.  Gait evaluation demonstrates ambulating independently.  Patient does not have " difficulty rising from a seated position.    *Opioid Universal Precautions:    UDT - 08/30/19 as expected  Opioid Consent - 10/11/2019    Opioid Agreement - 10/11/2019  Pharmacy- as documented    [place]  Reviewed - 01/15/2020  Pill Count - n/a  Psychological evaluation - diagnostic assessment through [facility]  MME - 20 increasing to 30  Pharmacogenetic testing - N/A    Imaging:  None new.    Assessment:   Sima Ross is a 54 y.o. female seen in clinic today for adhesive arachnoditis, history of lumbar fusion.  She has had recent SCS lead failure and she attempted reprogramming without benefit; she has not been using SCS device at all and states at this time would like to try and manage pain without surgical revision of leads.  She reports most difficulty controlling pain in evening/night after full day of work and asks about changing tramadol dosing from two times a day to three times a day for pain at these times.  She is maximizing her NSAIDs, muscle relaxants, gabapentin, topicals, and minimizing medical cannabis use due to her job.  She does need to be recertified.  She would like to repeat toradol IM.  We discuss monitoring renal function closely, last renal labs WNL 07/2019.  She denies other side effects.       Plan:   The patient is given Toradol 30 mg IM today to treat moderate to severe pain.  Patient denies contraindications to use including allergy/hypersensitivity to NSAIDs, active peptic ulcer disease or history of GI bleeding, anticoagulant medication use, renal disease, pregnancy/nursing, or high risk CV disease.  We discussed potential side effects including but not limited to: abdominal pain, nausea, vomiting, heartburn, constipation/diarrhea, drowsiness, headaches, injection site pain, rash, dizziness, itching, sweating.  Toradol 10 mg 1 tab every 6 hours prn max 5 days.  Do not take other NSAIDs during this time.  Refill given.  Once toradol prescription completed, resume Etodolac 200 mg  three times a day with food.    Continue Gabapentin 900 mg three times a day - refills at pharmacy from Dr. Cr  Continue Tizanidine 4 mg every 8 hours as needed for spasm - refill given.  May take Tramadol 50 mg 1-2 tabs 3 times a day prn pain - #180 tabs given for 30 days.    Continue walking for exercise as discussed.   Continue medical cannabis - will need to reregister, email provided today.  Patient is not pursuing SCS lead revision at this time; will leave this option open for the future if needed.  Follow-up in 2 months with Suzi CORTES to evaluate the above plan of care.    Suzi Segovia PA-C  [facility]  [address]  [place] [address]  Ph: [phone]  Fax: [phone]

## 2021-06-06 NOTE — TELEPHONE ENCOUNTER
Called patient regarding elevated potassium level from lab 03/11/2020.  Patient was already at work, denied any cardiac symptoms or feeling unwell.  Reports that she did take her BP medication today but overall has not been very compliant with dosing.  She is advised to hold further doses and will recheck [facility] lab today and I also advised her to call her PCP right away this morning to ask about an alternate plan for BP management and if there was any appointments today to be seen.  If not, she is advised that urgent care is an option if her lab level stays elevated or she develops any symptoms of shortness of breath, dizziness, chest pain, irregular heart beat/rate, feeling unwell including headache, nausea, vomiting.      I did also send urgent message to PCP Dr. Cr:  Hi Dr. Cr    I was checking labs yesterday for this patient due to renal concerns with toradol.  Her potassium is elevated.  I spoke with her this morning and advised her not to take her BP medication if that may be contributing and told her to call your office this morning to see about a med change and/or office visit availability.  She states she is not taking her BP medication consistently and it was high at yesterday's visit.  She denied any cardiac symptoms.  I placed another order for potassium draw today for her to complete as I wanted to rule out a falsely high reading from traumatic draw.  I advised her to present to urgent care if she could not reach you or developed any cardiac symptoms.  Is there anything else I can do to help?    Thanks,  Suzi Segovia PA-C  [facility]    Will continue to monitor and advise patient.  She reports she cancelled her travel to [place] this week due to risk of COVID19.

## 2021-06-06 NOTE — PATIENT INSTRUCTIONS - HE
The patient is given Toradol 30 mg IM today to treat moderate to severe pain.  Patient denies contraindications to use including allergy/hypersensitivity to NSAIDs, active peptic ulcer disease or history of GI bleeding, anticoagulant medication use, renal disease, pregnancy/nursing, or high risk CV disease.  We discussed potential side effects including but not limited to: abdominal pain, nausea, vomiting, heartburn, constipation/diarrhea, drowsiness, headaches, injection site pain, rash, dizziness, itching, sweating.  Toradol 10 mg 1 tab every 6 hours prn max 5 days.  Do not take other NSAIDs during this time.  Refill sent  Once toradol prescription completed, resume Etodolac 200 mg three times a day with food.    Renal (kidney) lab checked today to make sure no changes in function - I will send letter with results.  Continue Gabapentin 900 mg three times a day - refills at pharmacy from Dr. Cr.  Discussed ok to try and reduce one dose to 600 mg and see how that goes for a week.  If pain symptoms do not change, may try reducing another dose to 600 mg x 7 days and then try taking 600 mg three times a day.  IF at any time pain worsens, return to previously tolerated dose.  Continue Tizanidine 4 mg every 8 hours as needed for spasm - refill given.  May take Tramadol 50 mg 1-2 tabs up to 4 times a day prn pain - #240 tabs given for 30 days.  Discussed this is the maximum daily dose of this medication.  Continue walking for exercise as discussed.   Continue medical cannabis as able  Patient is not pursuing SCS lead revision at this time; will leave this option open for the future if needed.  Follow-up in 2 months with Suzi CORTES to evaluate the above plan of care.

## 2021-06-06 NOTE — PROGRESS NOTES
Pt is being seen today by  ARLEY Borden, for refills and f/u of 5-constant, burning pelvis, back and rt leg pain.    F4

## 2021-06-07 NOTE — PATIENT INSTRUCTIONS - HE
Ok to take Toradol 10 mg 1 tab every 6 hours prn max 5 days for pain flare, no refill needed yet.  Do not take other NSAIDs during this time.   Will also refill Ibuprofen 800 mg three times a day as needed with food when you are not taking Toradol  Continue Gabapentin 900 mg 2-3 times a day - refills at pharmacy from Dr. Cr.  Discussed ok to try and reduce one dose to 600 mg and see how that goes for a week.  If pain symptoms do not change, may try reducing another dose to 600 mg x 7 days and then try taking 600 mg three times a day.  IF at any time pain worsens, return to previously tolerated dose.  Continue Tizanidine 4 mg every 8 hours as needed for spasm.  Refill sent.  May take Tramadol 50 mg 1-2 tabs up to 4 times a day prn pain - #240 tabs given for 30 days to start on 05/10/20.  Discussed this is the maximum daily dose of this medication.  If you have any additional tablets remaining, please count for next visit so we can adjust refill date to stay accurate.  Continue walking for exercise as discussed.   Continue medical cannabis as able - recertification completed today  Patient is not pursuing SCS lead revision at this time; will leave this option open for the future if needed.  Reminder to get potassium level checked!  Follow-up in 2 months with Suzi CORTES to evaluate the above plan of care.

## 2021-06-09 NOTE — TELEPHONE ENCOUNTER
Due to be seen    Rx renewed per Medication Renewal Policy. Medication was last renewed on 7/30/19.    Kamilah Welsh, [facility] Triage/Med Refill 7/14/2020     Requested Prescriptions   Pending Prescriptions Disp Refills     rizatriptan (MAXALT) 10 MG tablet [Pharmacy Med Name: RIZATRIPTAN 10 MG TABLET] 15 tablet 3     Sig: TAKE 1 TABLET BY MOUTH AS NEEDED FOR MIGRAINE       Triptans Refill Protocol Passed - 7/14/2020  4:44 PM        Passed - PCP or prescribing provider visit in past 12 months       Last office visit with prescriber/PCP: 7/30/2019 Kamila Cr MD OR same dept: 7/30/2019 Kamila Cr MD OR same specialty: 7/30/2019 Kamila Cr MD  Last physical: Visit date not found Last MTM visit: Visit date not found   Next visit within 3 mo: Visit date not found  Next physical within 3 mo: Visit date not found  Prescriber OR PCP: Kamila Cr MD  Last diagnosis associated with med order: 1. Migraine with aura and without status migrainosus, not intractable  - rizatriptan (MAXALT) 10 MG tablet [Pharmacy Med Name: RIZATRIPTAN 10 MG TABLET]; TAKE 1 TABLET BY MOUTH AS NEEDED FOR MIGRAINE  Dispense: 15 tablet; Refill: 3    If protocol passes may refill for 12 months if within 3 months of last provider visit (or a total of 15 months).

## 2021-06-09 NOTE — TELEPHONE ENCOUNTER
"Patient left message on triage line stating that \"she was having a flare up, and that she is leaving town, moving on July 25 th.\"  She is requesting \"Toradol injection or Decadron dose pack\".   May call her at work, [phone] or at [phone].  "

## 2021-06-09 NOTE — TELEPHONE ENCOUNTER
"Called pt to let her know the 07/09  refill date is correct. Pt argued stating \" you all are messed up. It's only Tramadol. I've never had a problem like this in my life.\" Reviewed refill dates with the pt from the past couple months.   Offered withdrawal meds. Pt states she's run out of Tramadol and will need something.  Asking to have Suzi call her tomorrow and states she's going to \"file a report.\"   Pt has Tizanidine ordered three times a day prn. Please advise re: additional med if indicated.  "

## 2021-06-09 NOTE — TELEPHONE ENCOUNTER
I was not aware she is moving, please let her know she will be missed!  I have approved the refill, but please make her aware that I cannot refill tramadol after that due to federal guidelines stating we have a visit every 3 months for controlled medications.  Thanks

## 2021-06-09 NOTE — TELEPHONE ENCOUNTER
Medication being requested: Ketorolac  Last visit date: 05/06 Provider: IGNACIO  Next visit date: Not scheduled Provider: IGNACIO  Expected follow up: 2 mo  MTM visit (Pain Center) date: N/A  Pertinent between visit information about requested medication (telephone, mychart, prior authorization, concerns): Pt is moving to [place] per chart msg  Last date prescribed: 3/11 with 1 additional RF  Provider responsible: IGNACIO  Spoke with patient: Yes  Script being sent to provider - dates and quantity:   Requested Prescriptions     Pending Prescriptions Disp Refills     ketorolac (TORADOL) 10 mg tablet [Pharmacy Med Name: KETOROLAC 10 MG TABLET] 20 tablet 1     Sig: TAKE 1 TABLET (10 MG TOTAL) BY MOUTH EVERY 6 (SIX) HOURS AS NEEDED FOR PAIN.   Not sure when pt will be moving so didn't know whether you would want additional refill  Pharmacy cued: [facility]

## 2021-06-09 NOTE — TELEPHONE ENCOUNTER
Approved with 1 refill.  Aware of move and I put on last RX regarding tramadol that this would be last refills I give without a clinic visit.

## 2021-06-09 NOTE — TELEPHONE ENCOUNTER
I am not planning to call patient today due to full phone visit schedule and correct information reviewed below.  If she calls with concern about withdrawal, can also add in hydroxyzine 25-50 mg three times a day prn reviewing this medication can be sedating.  Thanks.

## 2021-06-09 NOTE — TELEPHONE ENCOUNTER
Unable to offer walk in for toradol injection at this time and she has oral toradol she may take.  Last medrol pack was 05/2020, and while helpful need to be cautious with how often we prescribe oral steroids.  I will send in a medrol dose pack, however would not recommend this again for the calendar year to avoid long term negative effects of steroids.  She also has made it clear she is not returning for follow-up visit due to move out of state and we have been advised we cannot offer telehealth or virtual care for patient's out of state so after 07/25 will not be able to offer pain management recommendations by phone.  Thanks.

## 2021-06-09 NOTE — PROGRESS NOTES
Order placed for Care Coordination.  Patient has a new primary with [facility] and no longer sees the [facility] clinic.   Patient also appears to be moving out of state per Pain Clinic.  Patient ineligible for CCC at this time.

## 2021-06-09 NOTE — TELEPHONE ENCOUNTER
"Reviewed note 5/6/20  \"May take Tramadol 50 mg 1-2 tabs up to 4 times a day prn pain - #240 tabs given for 30 days to start on 05/10/20.  Discussed this is the maximum daily dose of this medication.  If you have any additional tablets remaining, please count for next visit so we can adjust refill date to stay accurate\"    So 5/10-6/9 then 6/9-7/9 - dates are correct.     She is relocating per note July 1st. Which is why she does not have a follow up appointment.    Plan:  Given the dates are correct will forward script for July 9th no refills. Script to run from 7/9-8/8 - chronic pain. Please ask if she would also like withdrawal medication.    Requested Prescriptions     Signed Prescriptions Disp Refills     traMADoL (ULTRAM) 50 mg tablet 7/9-8/8 240 tablet 0     Sig: Take 1-2 tablets ( mg total) by mouth every 6 (six) hours as needed for pain.     Authorizing Provider: KAITLYN HAYWOOD       "

## 2021-06-10 NOTE — TELEPHONE ENCOUNTER
Medication being requested: Tizanidine  Last visit date: 5/6/20 Provider: IGNACIO  Next visit date: Not Scheduled Provider: IGNACIO  Expected follow up: 2 months  MTM visit (Pain Center) date: No  Pertinent between visit information about requested medication (telephone, mychart, prior authorization, concerns): No  Last date prescribed: 5/6/20  Provider responsible: IGNACIO  Spoke with patient: fax from pharmacy  Script being sent to provider - dates and quantity:   Requested Prescriptions     Pending Prescriptions Disp Refills     tiZANidine (ZANAFLEX) 4 MG tablet 90 tablet 1     Sig: Take 1 tablet (4 mg total) by mouth every 8 (eight) hours as needed.     [facility]      
Pt has moved to [place], [place], pt has not yet found a Dr there.    
Rx queued  
Will refill tizanidine as this is not a controlled medication.  She has been notified that other controlled refills such as tramadol will be denied based on lapse of time since last visit and now out of the state so we cannot provide virtual care.  Of note, this was queued for her local pharmacy so I'm not sure how she is planning to get it.  Thanks.  
DISPLAY PLAN FREE TEXT

## 2021-06-10 NOTE — PROGRESS NOTES
"Clinic Care Coordination Contact  [facility]/Voicemail       Clinical Data: Care Coordinator Outreach  Outreach attempted x 1.  Woman's voice answered the phone, and when asked for name of patient caller stated \"wrong number\" and abruptly hung up.   Per review of notes, patient stated to care team she was relocating out of state at the end of July 2020.  Plan:  Care Coordinator will do no further outreaches at this time.    Merlyn Overton RN  Clinical Product Navigator        "

## 2021-06-12 NOTE — TELEPHONE ENCOUNTER
Central PA team  [phone]  Pool: HE PA MED ([id number])          PA has been initiated.       PA form completed and faxed insurance via Cover My Meds     Key:  [id number]     Medication:  Telmisartan-HCTZ 40-12.5MG tablets    Insurance:  [employer]        Response will be received via fax and may take up to 5-10 business days depending on plan

## 2021-06-12 NOTE — TELEPHONE ENCOUNTER
Prior Authorization Request  Who s requesting:  Pharmacy  Pharmacy Name and Location: [facility] #[id number]  Medication Name: telmisartan-hydrochlorothiazide (MICARDIS HCT) 40-12.5 mg per tablet  Insurance Plan: No info under the Verify Rx Benefits tab.   Insurance Member ID Number:  No info under the Verify Rx Benefits tab.   CoverMyMeds Key: [id number]  Informed patient that prior authorizations can take up to 10 business days for response:   NA  Okay to leave a detailed message: No

## 2021-06-16 NOTE — TELEPHONE ENCOUNTER
"Telephone Encounter by Charley Lanier, RN at 12/6/2019  3:47 PM     Author: Charley Lanier RN Service: -- Author Type: Registered Nurse    Filed: 12/6/2019  4:24 PM Encounter Date: 11/26/2019 Status: Signed    : Charley Lanier, RN (Registered Nurse)       I Returned call to patient this afternoon. I was calling to review her requests. She started by saying the person she talked too, didn't seem to want to help her.  She continued about getting paperwork that she is confused about. She started saying, has a Stimulator and not working.  I informed her that her request for a Toradol shot, would be denied. That we do not do walk-in injections, and that another nurse has reviewed that on previous call. She responded, \"well that's what works\". She then said again the the person she talked too wasn't helpful, and I asked her, what person? She then said, she did not want to talk to me, and hung up. I called her right back. She said, she was at work and trying to do her job, and didn't want to talk, and hung up again.  This patient had requested a call. There is only one phone number listed for her. I was not able to explain her paperwork, which should have been a JOVANA, as requested by Suzi. I also was not able to review her request for increasing Tramadol. Tramadol is not due until 12/13. Will cue as usual. This patient has been extremely rude to  staff, and to this RN, trying to do our jobs. Will review patient's behavior with provider.    Medication being requested: ultram  Last visit date: 10/1.  Provider: RW  Next visit date: 1/15.  Provider: RW  Expected follow up: 4-6 weeks  MTM visit (Pain Center) date: no  UDT date: 8/30/19  Agreement date: 10/2019   snipped in:    Pertinent between visit information about requested medication (telephone, mychart, prior authorization, concerns, comments): calls regarding SCS. ALSO RUDE TO STAFF.  Script being sent to provider by nurse- dates " and quantity:   Requested Prescriptions     Pending Prescriptions Disp Refills   ? traMADol (ULTRAM) 50 mg tablet 120 tablet 0     Sig: Take 1-2 tablets ( mg total) by mouth 2 (two) times a day as needed for pain.     [facility]  Standing orders for withdrawal protocol implemented: yvette

## 2021-06-16 NOTE — TELEPHONE ENCOUNTER
Telephone Encounter by Tanisha Couch at 11/14/2019  9:41 AM     Author: Tanisha Couch Service: -- Author Type: --    Filed: 11/14/2019  9:43 AM Encounter Date: 11/13/2019 Status: Signed    : Tanisha Couch       No PA needed, there is an approval on file for this medication and dosing until 11/5/2020.  Called pharmacy and patient already picked up medication.

## 2021-06-16 NOTE — TELEPHONE ENCOUNTER
Telephone Encounter by Shena Ch RN at 11/6/2019 10:17 AM     Author: Shena Ch RN Service: -- Author Type: Registered Nurse    Filed: 11/6/2019 10:18 AM Encounter Date: 11/5/2019 Status: Signed    : Shena Ch RN (Registered Nurse)

## 2021-06-17 NOTE — TELEPHONE ENCOUNTER
Telephone Encounter by Gabriella De La Paz at 11/9/2020 12:31 PM     Author: Gabriella De La Paz Service: -- Author Type: --    Filed: 11/9/2020 12:32 PM Encounter Date: 11/6/2020 Status: Signed    : Gabriella De La Paz APPROVED:    Approval start date: 11/09/2020  Approval end date:  11/09/2021    Pharmacy has been notified of approval and will contact patient when medication is ready for pickup.

## 2021-06-17 NOTE — TELEPHONE ENCOUNTER
Telephone Encounter by Noemy Minaya RN at 7/6/2020 11:43 AM     Author: Noemy Minaya RN Service: -- Author Type: Registered Nurse    Filed: 7/6/2020 12:00 PM Encounter Date: 7/6/2020 Status: Signed    : Noemy Minaya RN (Registered Nurse)       Pt states refill date incorrect for Tramadol. Script that was sent is dated 07/09 but was due 07/05. Called [facility] pharmacy to cancel the script and reordered it to fill today. For [facility]  Last visit 05/06 RW  Next visit : Not scheduled  Expected return 2 mo  Pt is moving to [place], which provider is aware of. She was advised that this Tramadol script with 1 additional RF will be the last one provided without an appt.

## 2021-06-17 NOTE — TELEPHONE ENCOUNTER
Telephone Encounter by Lillian Lake RN at 7/1/2020  4:02 PM     Author: Lillian Lake RN Service: -- Author Type: Registered Nurse    Filed: 7/1/2020  4:04 PM Encounter Date: 7/1/2020 Status: Signed    : Lillian Lake RN (Registered Nurse)       Medication being requested: Tramadol  Last visit date: 5/6/20.  Provider: IGNACIO  Next visit date: Not Scheduled  Provider: IGNACIO  Expected follow up: 2 months  MTM visit (Pain Center) date: No  UDT date: 8/2019  Agreement date: 10/2019   snipped in:    Pertinent between visit information about requested medication (telephone, mychart, prior authorization, concerns, comments):   Patient is requesting a refill for Tramadol with an extra month (refill is usually provided anyway) in order to get a provider established in [place] and she is moving there.  Script being sent to provider by nurse- dates and quantity:   7/9/20-9/7/20, Qty: 240 + 1 refill  Requested Prescriptions     Pending Prescriptions Disp Refills   ? traMADoL (ULTRAM) 50 mg tablet 240 tablet 1     Sig: Take 1-2 tablets ( mg total) by mouth every 6 (six) hours as needed for pain.     [facility]  Standing orders for withdrawal protocol implemented: RIVERA

## 2021-06-28 NOTE — PROGRESS NOTES
Progress Notes by Suzi Segovia PA-C at 3/11/2020  8:40 AM     Author: Suzi Segovia PA-C Service: -- Author Type: Physician Assistant    Filed: 3/11/2020  9:45 AM Date of Service: 3/11/2020  8:40 AM Status: Signed    : Suzi Segovia PA-C (Physician Assistant)       PAIN CENTER PROGRESS NOTE    Subjective:   Sima Ross is a 54 y.o. female who presents for evaluation of low back pain.  Consult was 08/30/19.    Major issues:  1. Chronic pain syndrome    2. Arachnoiditis    3. Chronic, continuous use of opioids      Pain location and description: 5/10 constant burning in pelvis, back, and right hip and leg to knee.  Function rated 4.  At best, pain rated 4/10, at worst pain rated 9/10, on average pain rated 6/10.    Gait disturbance: None reported  Exacerbating factors: sitting for a long time  Alleviating factors: heating pad, walking, laying down/rest, medications (tramadol)  Associated symptoms: Denies pain at night, numbness, weakness, bowel or bladder incontinence, fever/chills, unexplained weight loss.  Functional Pain symptoms: Pain affects work, sexual health, mood (depressed, frustrated, anxious).  Reports function has improved with current pain treatments.  Adverse effects of medications: None reported IBS bentyl as needed.  Eating salads.    Current treatment efficacy: Good.  Taking gabapentin, Etodo lac 200 mg three times a day, tizanidine 4 mg three times a day prn, toradol 10 mg prn, tramadol 100 mg three times a day prn.  Current treatment compliance: New to pain center.  Had 1 episode of self-escalation of medication at [facility], tapered off schedule 2 opioids and does not wish to take.  Has returned back to work full-time.    Since the last Pain Center visit, the patient denies any use of anticoagulant medications. Denies any new diagnostic testing, new treatments or new medical conditions, any changes in medications, or any ED/urgent care visits.  Patient denies  "the chance of being pregnant or wanting to become pregnant.  She reports pain has not changed and thinks her pain is good \"most of the time\" having 1-2 days per week when pain feels difficult to manage without trigger.    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 She states towards end of day increase in pain.  States she sits a lot at work, is using exercise ball sitting on it up to 4 hours a day for work on core strength.  Can reposition and has an hour lunch can get up and move around or lay down in car seat.  Colder weather bothers her more, but she has not missed work due to pain.  She values that she can go to work each day to provide for herself and she reports she likes the distraction from her pain.    States there are days with a \"lock up\" feeling in her lower back 1-2 days per week that her pain is intense.  States she wakes up feeling her pelvis on fire and tailbone into pelvis but not down leg.  She asks about taking an additional dose of ultram on those days.  She is usually taking 100 mg three times a day without side effects.  Reports she doesn't want to take anything \"stronger\" than tramadol.    Gabapentin taking 900 mg at 0900, 1300, and 1800.  She states her memory still is a concern.  Would eventually like to try reducing doses to 600 mg.      She states without SCS she is doing fine.  She reports she has adjusted to her level of chronic pain. She can walk, clean the house, do laundry, do things she wasn't doing before.  States her and her boyfriend have date night once per week. She is traveling tomorrow to [place] to see her mother and visit friends and will be involved in a [employer] day parade.  Would like to have toradol injection today as this really helps her pain and will allow better pain coverage for her travel.      She is not using the medical cannabis as much at work.  She feels \"stupid\" on it as she feels it impairs her memory.  She does " "use at night after work and helps her sleep better and mentally helps her as well. She states cannabis use increased since being without her SCS device; she states she uses every evening and lays down after that to rest her back.  She states 45 minutes to get spasms to calm down.  Uses again before bedtime.  Follows [place] [facility], red vape.  She is interested in higher CBD levels. I did recertify her and reviewed website today:      Review of Systems  Constitutional: Sleep disturbance at times.  Denies fever, chills, night sweats, lethargy, weight loss, weight gain  Musculoskeletal: Positive for back pain.  Denies muscle pain, weakness, joint pain, joint swelling, recent falls.  Gastrointestional: Denies difficulty swallowing, change in appetite, abdominal pain, constipation, nausea, vomiting, diarrhea, GERD, fecal incontinence.  Genitourinary: Denies urinary incontinence, dysuria, hematuria, UTI, frequency, hesitancy, change in libido.  Neurologic: Denies headaches, confusion, seizure, weakness, changes in balance, changes in speech.  Psychiatric: Denies depression, anxiety, memory loss, psychoses, suicidal ideation, substance use/abuse.     Objective:     Vitals:    03/11/20 0822   BP: (!) 188/117   Pulse: 88   Weight: 170 lb (77.1 kg)   Height: 5' 9\" (1.753 m)   PainSc:   5   PainLoc: Back     Physical Exam  Constitutional- General appearance: Well developed, comfortable reclining on exam table during visit, overweight and appearance reflects stated age.  No acute distress or pain behaviors noted.  Presents alone.  Psychiatric- Judgment and insight: Normal.  Speech: Normal rhythm.  Thought process: Normal.  No abnormal thoughts reported. Alert & Oriented to person, place, and time.  Recent and remote memory: Normal.  Mood and affect: Normal.  Observed mood: appropriate.   Respiratory- Breathing is non-labored; normal rhythm and rate.  Cardiovascular- Extremities warm and well perfused, no peripheral edema " or varicosities.  Dermatologic- Exposed skin is clean, dry, and intact to inspection and palpation.  Musculoskeletal- Gait and station: Normal.  Gait evaluation demonstrates ambulating independently.  Patient does not have difficulty rising from a seated position.    *Opioid Universal Precautions:    UDT - 08/30/19 as expected  Opioid Consent - 10/11/2019    Opioid Agreement - 10/11/2019  Pharmacy- as documented    [place]  Reviewed - 03/11/2020 as expected  Pill Count - n/a  Psychological evaluation - diagnostic assessment through [facility]  MME - 30 increasing to 40   Pharmacogenetic testing - N/A    Imaging:  None new.    Assessment:   Sima Ross is a 54 y.o. female seen in clinic today for adhesive arachnoditis, history of lumbar fusion.  She has had recent SCS lead failure and she attempted reprogramming without benefit; she has not been using SCS device at all and states at this time would like to try and manage pain without surgical revision of leads.  She reports most difficulty controlling pain in evening/night after full day of work and pain flares 1-2 times per week asks about changing tramadol dosing to 100 mg four times a day for these days.  Review this is max dosing of tramadol recommended and she is not having any side effects currently, will monitor tolerability on increase.  She is continuing with her NSAIDs, muscle relaxants, gabapentin (would like to reduce dose further as tolerated due to cognitive effects), topicals, and minimizing medical cannabis use due to her job, does use at night prn.  She would like to repeat toradol IM today.  We discuss monitoring renal function closely, last renal labs WNL 07/2019 so will recheck today.         Plan:   The patient is given Toradol 30 mg IM today to treat moderate to severe pain.  Patient denies contraindications to use including allergy/hypersensitivity to NSAIDs, active peptic ulcer disease or history of GI bleeding, anticoagulant medication  use, renal disease, pregnancy/nursing, or high risk CV disease.  We discussed potential side effects including but not limited to: abdominal pain, nausea, vomiting, heartburn, constipation/diarrhea, drowsiness, headaches, injection site pain, rash, dizziness, itching, sweating.  Toradol 10 mg 1 tab every 6 hours prn max 5 days.  Do not take other NSAIDs during this time.  Refill sent  Once toradol prescription completed, resume Etodolac 200 mg three times a day with food.    Renal (kidney) lab checked today to make sure no changes in function - I will send letter with results.  Continue Gabapentin 900 mg three times a day - refills at pharmacy from Dr. Cr.  Discussed ok to try and reduce one dose to 600 mg and see how that goes for a week.  If pain symptoms do not change, may try reducing another dose to 600 mg x 7 days and then try taking 600 mg three times a day.  IF at any time pain worsens, return to previously tolerated dose.  Continue Tizanidine 4 mg every 8 hours as needed for spasm - refill given.  May take Tramadol 50 mg 1-2 tabs up to 4 times a day prn pain - #240 tabs given for 30 days.  Discussed this is the maximum daily dose of this medication.  Continue walking for exercise as discussed.   Continue medical cannabis as able  Patient is not pursuing SCS lead revision at this time; will leave this option open for the future if needed.  Follow-up in 2 months with Suzi CORTES to evaluate the above plan of care.    Suzi Segovia PA-C  [facility]  [address]  [place] [address]  Ph: [phone]  Fax: [phone]

## 2021-06-29 NOTE — PROGRESS NOTES
Progress Notes by Suzi Segovia PA-C at 5/6/2020  9:40 AM     Author: Suzi Segovia PA-C Service: -- Author Type: Physician Assistant    Filed: 5/6/2020 10:14 AM Date of Service: 5/6/2020  9:40 AM Status: Signed    : Suzi Segovia PA-C (Physician Assistant)       PAIN CENTER PROGRESS NOTE    Subjective:   Sima Ross is a 54 y.o. female who presents for evaluation of low back pain.  Consult was 08/30/19.    Major issues:  1. Chronic pain syndrome    2. Arachnoiditis    3. Chronic, continuous use of opioids    4. Medical cannabis use      Pain location and description: 4/10 constant burning in pelvis, back, and right hip and leg to knee.  Function rated 0.    Radiation of pain: Fire poker in tailbone, throughout pelvis/hips  Gait disturbance: None reported  Exacerbating factors: sitting for a long time  Alleviating factors: heating pad, walking, laying down/rest, medications (tramadol)  Associated symptoms: Denies pain at night, numbness, weakness, bowel or bladder incontinence, fever/chills, unexplained weight loss.  Functional Pain symptoms: see CMA note.  Adverse effects of medications: None reported  Current treatment efficacy: Good.  Taking gabapentin 900 mg two times a day-TID, Ibuprofen 800 mg three times a day prn, tizanidine 4 mg three times a day prn, toradol 10 mg prn, tramadol 100 mg three times a day prn.  Current treatment compliance: Good. Had 1 episode of self-escalation of medication at [facility], tapered off schedule 2 opioids and does not wish to take.  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  She is walking for exercise. Reports taking medication as prescribed and is keeping scheduled appointments.    Since the last Pain Center visit, the patient denies any use of anticoagulant medications. Denies any new diagnostic testing, new treatments or new medical conditions, any changes in medications, or any ED/urgent care visits.  Patient denies the chance of  being pregnant or wanting to become pregnant.  We did check renal function profile last visit due to continued use of toradol and potassium was elevated; patient was notified immediately and redraw was ordered.  Her PCP was also notified and she was advised to ask about BP medications.   She did not follow up with potassium redraw as she felt it was elevated due to traumatic draw and also she is concerned about doing now with COVID19.  She does plan to recheck.    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 She states towards end of day increase in pain.  States she sits a lot at work, is using exercise ball sitting on it up to 4 hours a day for work on core strength.  Can reposition and has an hour lunch can get up and move around or lay down in car seat. States her pain has been well managed and current plan is working for her.    Gabapentin taking 900 mg taking two times a day.  She states some days she has to take three times a day 2-3 days per week.  She states her goal is to take less medication.  Is varying dose of tramadol if she is able to take less but most days is taking 100 mg four times a day and she is not reporting side effects.      She states without SCS she is doing fine.  She reports she has adjusted to her level of chronic pain. She can walk, clean the house, do laundry, do things she wasn't doing before.  States her and her boyfriend have date night once per week.     She is not using the medical cannabis as much at work as she feels it impairs her memory.  She does use at night after work and helps her sleep better. Follows [place] [employer], red vape.  She is interested in higher CBD levels. I did recertify her and reviewed website today:      Review of Systems  Constitutional: Sleep disturbance at times, medical cannabis helpful.  Denies fever, chills, night sweats, lethargy, weight loss, weight gain  Musculoskeletal: Positive for back pain.  Denies muscle  pain, weakness, joint pain, joint swelling, recent falls.  Gastrointestional: Denies difficulty swallowing, change in appetite, abdominal pain, constipation, nausea, vomiting, diarrhea, GERD, fecal incontinence.  Genitourinary: Denies urinary incontinence, dysuria, hematuria, UTI, frequency, hesitancy, change in libido.  Neurologic: Denies headaches, confusion, seizure, weakness, changes in balance, changes in speech.  Psychiatric: Denies depression, anxiety, memory loss, psychoses, suicidal ideation, substance use/abuse.     *Opioid Urania Precautions:    UDT - 08/30/19 as expected  Opioid Consent - 10/11/2019    Opioid Agreement - 10/11/2019  Pharmacy- as documented    [place]  Reviewed - 05/06/2020 as expected  Pill Count - n/a  [facility]   Pharmacogenetic testing - N/A    Imaging:  None new.    Assessment:   Sima Ross is a 54 y.o. female seen in clinic today for adhesive arachnoditis, history of lumbar fusion.  She has had recent SCS lead failure and she attempted reprogramming without benefit; she has not been using SCS device at all and states at this time would like to try and manage pain without surgical revision of leads as she is stable.  She continues tramadol, NSAIDs (is aware not to take ibuprofen if taking toradol for a pain flare), muscle relaxants, gabapentin (would like to reduce dose further as tolerated due to cognitive effects), topicals, and minimizing medical cannabis use due to her job, does use at night prn.  We discuss monitoring renal function closely, she is expected to get potassium level rechecked, she is aware of dangers of elevated potassium.     Plan:   Ok to take Toradol 10 mg 1 tab every 6 hours prn max 5 days for pain flare, no refill needed yet.  Do not take other NSAIDs during this time.   Will also refill Ibuprofen 800 mg three times a day as needed with food when you are not taking Toradol  Continue  Gabapentin 900 mg 2-3 times a day - refills at pharmacy from Dr. Cr.  Discussed ok to try and reduce one dose to 600 mg and see how that goes for a week.  If pain symptoms do not change, may try reducing another dose to 600 mg x 7 days and then try taking 600 mg three times a day.  IF at any time pain worsens, return to previously tolerated dose.  Continue Tizanidine 4 mg every 8 hours as needed for spasm.  Refill sent.  May take Tramadol 50 mg 1-2 tabs up to 4 times a day prn pain - #240 tabs given for 30 days to start on 05/10/20.  Discussed this is the maximum daily dose of this medication.  If you have any additional tablets remaining, please count for next visit so we can adjust refill date to stay accurate.  Continue walking for exercise as discussed.   Continue medical cannabis as able - recertification completed today  Patient is not pursuing SCS lead revision at this time; will leave this option open for the future if needed.  Reminder to get potassium level checked!  Follow-up in 2 months with Suzi CORTES to evaluate the above plan of care.    Call Time: 15 minutes  Start - 0947  Stop - 1002    Suzi Segovia PA-C  [facility]  [address]  [place] [address]  Ph: [phone]  Fax: [phone]

## 2021-06-29 NOTE — PROGRESS NOTES
"Progress Notes by Nikole Westfall CMA at 5/6/2020  9:40 AM     Author: Nikole Westfall CMA Service: -- Author Type: Certified Medical Assistant    Filed: 5/6/2020 10:14 AM Date of Service: 5/6/2020  9:40 AM Status: Signed    : Nikole Westfall CMA (Certified Medical Assistant)       Sima Ross is a 54 y.o. female who is being evaluated via a billable telephone visit regarding back pain. Patient describes her pain asburning in hip, radiates, intermittent. Patient's everyday functions are not affected by pain.     The patient has been notified of following:     \"This telephone visit will be conducted via a call between you and your physician/provider. We have found that certain health care needs can be provided without the need for a physical exam.  This service lets us provide the care you need with a short phone conversation.  If a prescription is necessary we can send it directly to your pharmacy.  If lab work is needed we can place an order for that and you can then stop by our lab to have the test done at a later time. Telephone visits are billed at different rates depending on your insurance coverage. During this emergency period, for some insurers they may be billed the same as an in-person visit.  Please reach out to your insurance provider with any questions.\"    Patient has given verbal consent to a Telephone visit? Yes    What phone number would you like to be contacted at? Preferred number.     Patient would like to receive their AVS by mail.         Nikole Westfall CMA             "

## 2021-07-03 NOTE — ADDENDUM NOTE
Addendum Note by Dianne Spencer at 5/6/2020  9:40 AM     Author: Dianne Spencer Service: -- Author Type: --    Filed: 5/11/2020 10:26 AM Date of Service: 5/6/2020  9:40 AM Status: Signed    : Dianne Spencer    Encounter addended by: Dianne Spencer on: 5/11/2020 10:26 AM      Actions taken: Charge Capture section accepted

## 2021-10-03 ENCOUNTER — HEALTH MAINTENANCE LETTER (OUTPATIENT)
Age: 56
End: 2021-10-03

## 2022-01-24 NOTE — TELEPHONE ENCOUNTER
Patient still wheezing today  Has sensed some benefit from restarting Symbicort  Did stop smoking as of a week ago  Is having allergy testing done by Dr Shanelle Wu  PFT's show fairly significant airflow obstruction without much bronchodilator improvement  There is moderate air trapping  Decline in DLCO makes me think that there is likely some emphysema also  I suspect this will be more properly labeled as an asthma COPD overlap  I added Spiriva today  Brief course of prednisone  Advised allergy testing has to be off of prednisone  Sounds good  Sn

## 2022-03-20 ENCOUNTER — HEALTH MAINTENANCE LETTER (OUTPATIENT)
Age: 57
End: 2022-03-20

## 2022-05-15 ENCOUNTER — HEALTH MAINTENANCE LETTER (OUTPATIENT)
Age: 57
End: 2022-05-15

## 2022-09-11 ENCOUNTER — HEALTH MAINTENANCE LETTER (OUTPATIENT)
Age: 57
End: 2022-09-11

## 2023-06-03 ENCOUNTER — HEALTH MAINTENANCE LETTER (OUTPATIENT)
Age: 58
End: 2023-06-03

## (undated) DEVICE — GLOVE PROTEXIS BLUE W/NEU-THERA 6.0  2D73EB60

## (undated) DEVICE — DRSG STERI STRIP 1/2X4" R1547

## (undated) DEVICE — SYR EAR BULB 3OZ 0035830

## (undated) DEVICE — DRSG KERLIX FLUFFS X5

## (undated) DEVICE — PREP DURAPREP 26ML APL 8630

## (undated) DEVICE — GLOVE PROTEXIS POWDER FREE 7.0 ORTHOPEDIC 2D73ET70

## (undated) DEVICE — DRAPE SHEET REV FOLD 3/4 9349

## (undated) DEVICE — DRAPE COVER C-ARM SEAMLESS SNAP-KAP 03-KP26 LATEX FREE

## (undated) DEVICE — SUCTION CANISTER MEDIVAC LINER 3000ML W/LID 65651-530

## (undated) DEVICE — SYR 07ML EPIDURAL LOSS OF RESISTANCE PULSATOR 4905

## (undated) DEVICE — SU VICRYL 0 CT-2 CR 8X18" J727D

## (undated) DEVICE — SU VICRYL 4-0 PS-2 18" UND J496H

## (undated) DEVICE — COVER CAMERA ENDOVIDEO

## (undated) DEVICE — PACK UNIVERSAL SPLIT 29131

## (undated) DEVICE — PACK MINOR SBA15MIFSE

## (undated) DEVICE — DRAPE IOBAN INCISE 23X17" 6650EZ

## (undated) DEVICE — SYSTEM CHARGING EXT PATIENT MEDT 97754

## (undated) DEVICE — LINEN TOWEL PACK X5 5464

## (undated) DEVICE — PROGRAMMER PATIENT MEDT MYSTIM 97740

## (undated) DEVICE — DRSG TELFA ISLAND 4X8" 7541

## (undated) DEVICE — GLOVE PROTEXIS MICRO 6.0  2D73PM60

## (undated) DEVICE — SU SILK 2-0 SH CR 8X18" C012D

## (undated) RX ORDER — HYDROMORPHONE HYDROCHLORIDE 2 MG/1
TABLET ORAL
Status: DISPENSED
Start: 2017-11-21

## (undated) RX ORDER — FENTANYL CITRATE 50 UG/ML
INJECTION, SOLUTION INTRAMUSCULAR; INTRAVENOUS
Status: DISPENSED
Start: 2017-11-21

## (undated) RX ORDER — CLINDAMYCIN PHOSPHATE 900 MG/50ML
INJECTION, SOLUTION INTRAVENOUS
Status: DISPENSED
Start: 2017-11-21

## (undated) RX ORDER — LIDOCAINE HYDROCHLORIDE 20 MG/ML
INJECTION, SOLUTION EPIDURAL; INFILTRATION; INTRACAUDAL; PERINEURAL
Status: DISPENSED
Start: 2017-11-21

## (undated) RX ORDER — ONDANSETRON 2 MG/ML
INJECTION INTRAMUSCULAR; INTRAVENOUS
Status: DISPENSED
Start: 2017-11-21

## (undated) RX ORDER — PROPOFOL 10 MG/ML
INJECTION, EMULSION INTRAVENOUS
Status: DISPENSED
Start: 2017-11-21